# Patient Record
Sex: FEMALE | Race: WHITE | NOT HISPANIC OR LATINO | URBAN - METROPOLITAN AREA
[De-identification: names, ages, dates, MRNs, and addresses within clinical notes are randomized per-mention and may not be internally consistent; named-entity substitution may affect disease eponyms.]

---

## 2019-08-08 ENCOUNTER — EMERGENCY (EMERGENCY)
Facility: HOSPITAL | Age: 84
LOS: 1 days | Discharge: HOME | End: 2019-08-08
Attending: EMERGENCY MEDICINE | Admitting: EMERGENCY MEDICINE
Payer: MEDICARE

## 2019-08-08 VITALS
HEIGHT: 66 IN | WEIGHT: 132.94 LBS | DIASTOLIC BLOOD PRESSURE: 84 MMHG | TEMPERATURE: 98 F | RESPIRATION RATE: 16 BRPM | OXYGEN SATURATION: 96 % | SYSTOLIC BLOOD PRESSURE: 142 MMHG | HEART RATE: 85 BPM

## 2019-08-08 VITALS
DIASTOLIC BLOOD PRESSURE: 68 MMHG | RESPIRATION RATE: 18 BRPM | SYSTOLIC BLOOD PRESSURE: 157 MMHG | HEART RATE: 88 BPM | OXYGEN SATURATION: 97 %

## 2019-08-08 DIAGNOSIS — Z98.89 OTHER SPECIFIED POSTPROCEDURAL STATES: Chronic | ICD-10-CM

## 2019-08-08 DIAGNOSIS — Z96.652 PRESENCE OF LEFT ARTIFICIAL KNEE JOINT: Chronic | ICD-10-CM

## 2019-08-08 DIAGNOSIS — Z98.41 CATARACT EXTRACTION STATUS, RIGHT EYE: Chronic | ICD-10-CM

## 2019-08-08 DIAGNOSIS — Z86.79 PERSONAL HISTORY OF OTHER DISEASES OF THE CIRCULATORY SYSTEM: Chronic | ICD-10-CM

## 2019-08-08 LAB
ALBUMIN SERPL ELPH-MCNC: 3.8 G/DL — SIGNIFICANT CHANGE UP (ref 3.3–5)
ALP SERPL-CCNC: 66 U/L — SIGNIFICANT CHANGE UP (ref 30–120)
ALT FLD-CCNC: 17 U/L DA — SIGNIFICANT CHANGE UP (ref 10–60)
ANION GAP SERPL CALC-SCNC: 11 MMOL/L — SIGNIFICANT CHANGE UP (ref 5–17)
APTT BLD: 38.9 SEC — HIGH (ref 28.5–37)
AST SERPL-CCNC: 16 U/L — SIGNIFICANT CHANGE UP (ref 10–40)
BASOPHILS # BLD AUTO: 0.02 K/UL — SIGNIFICANT CHANGE UP (ref 0–0.2)
BASOPHILS NFR BLD AUTO: 0.3 % — SIGNIFICANT CHANGE UP (ref 0–2)
BILIRUB SERPL-MCNC: 3 MG/DL — HIGH (ref 0.2–1.2)
BUN SERPL-MCNC: 26 MG/DL — HIGH (ref 7–23)
CALCIUM SERPL-MCNC: 10.7 MG/DL — HIGH (ref 8.4–10.5)
CHLORIDE SERPL-SCNC: 104 MMOL/L — SIGNIFICANT CHANGE UP (ref 96–108)
CO2 SERPL-SCNC: 24 MMOL/L — SIGNIFICANT CHANGE UP (ref 22–31)
CREAT SERPL-MCNC: 1.41 MG/DL — HIGH (ref 0.5–1.3)
EOSINOPHIL # BLD AUTO: 0.03 K/UL — SIGNIFICANT CHANGE UP (ref 0–0.5)
EOSINOPHIL NFR BLD AUTO: 0.4 % — SIGNIFICANT CHANGE UP (ref 0–6)
GLUCOSE SERPL-MCNC: 177 MG/DL — HIGH (ref 70–99)
HCT VFR BLD CALC: 33.6 % — LOW (ref 34.5–45)
HGB BLD-MCNC: 11.8 G/DL — SIGNIFICANT CHANGE UP (ref 11.5–15.5)
IMM GRANULOCYTES NFR BLD AUTO: 0.4 % — SIGNIFICANT CHANGE UP (ref 0–1.5)
INR BLD: 2.36 RATIO — HIGH (ref 0.88–1.16)
LACTATE SERPL-SCNC: 1.3 MMOL/L — SIGNIFICANT CHANGE UP (ref 0.7–2)
LACTATE SERPL-SCNC: 2.3 MMOL/L — HIGH (ref 0.7–2)
LYMPHOCYTES # BLD AUTO: 2.3 K/UL — SIGNIFICANT CHANGE UP (ref 1–3.3)
LYMPHOCYTES # BLD AUTO: 29.3 % — SIGNIFICANT CHANGE UP (ref 13–44)
MCHC RBC-ENTMCNC: 32.3 PG — SIGNIFICANT CHANGE UP (ref 27–34)
MCHC RBC-ENTMCNC: 35.1 GM/DL — SIGNIFICANT CHANGE UP (ref 32–36)
MCV RBC AUTO: 92.1 FL — SIGNIFICANT CHANGE UP (ref 80–100)
MONOCYTES # BLD AUTO: 0.65 K/UL — SIGNIFICANT CHANGE UP (ref 0–0.9)
MONOCYTES NFR BLD AUTO: 8.3 % — SIGNIFICANT CHANGE UP (ref 2–14)
NEUTROPHILS # BLD AUTO: 4.83 K/UL — SIGNIFICANT CHANGE UP (ref 1.8–7.4)
NEUTROPHILS NFR BLD AUTO: 61.3 % — SIGNIFICANT CHANGE UP (ref 43–77)
NRBC # BLD: 0 /100 WBCS — SIGNIFICANT CHANGE UP (ref 0–0)
PLATELET # BLD AUTO: 181 K/UL — SIGNIFICANT CHANGE UP (ref 150–400)
POTASSIUM SERPL-MCNC: 4.2 MMOL/L — SIGNIFICANT CHANGE UP (ref 3.5–5.3)
POTASSIUM SERPL-SCNC: 4.2 MMOL/L — SIGNIFICANT CHANGE UP (ref 3.5–5.3)
PROT SERPL-MCNC: 7.3 G/DL — SIGNIFICANT CHANGE UP (ref 6–8.3)
PROTHROM AB SERPL-ACNC: 26.4 SEC — HIGH (ref 10–12.9)
RBC # BLD: 3.65 M/UL — LOW (ref 3.8–5.2)
RBC # FLD: 12.8 % — SIGNIFICANT CHANGE UP (ref 10.3–14.5)
SODIUM SERPL-SCNC: 139 MMOL/L — SIGNIFICANT CHANGE UP (ref 135–145)
WBC # BLD: 7.86 K/UL — SIGNIFICANT CHANGE UP (ref 3.8–10.5)
WBC # FLD AUTO: 7.86 K/UL — SIGNIFICANT CHANGE UP (ref 3.8–10.5)

## 2019-08-08 PROCEDURE — 73590 X-RAY EXAM OF LOWER LEG: CPT | Mod: 26,RT

## 2019-08-08 PROCEDURE — 99284 EMERGENCY DEPT VISIT MOD MDM: CPT

## 2019-08-08 PROCEDURE — 85730 THROMBOPLASTIN TIME PARTIAL: CPT

## 2019-08-08 PROCEDURE — 36415 COLL VENOUS BLD VENIPUNCTURE: CPT

## 2019-08-08 PROCEDURE — 85610 PROTHROMBIN TIME: CPT

## 2019-08-08 PROCEDURE — 85027 COMPLETE CBC AUTOMATED: CPT

## 2019-08-08 PROCEDURE — 96365 THER/PROPH/DIAG IV INF INIT: CPT

## 2019-08-08 PROCEDURE — 96361 HYDRATE IV INFUSION ADD-ON: CPT

## 2019-08-08 PROCEDURE — 99284 EMERGENCY DEPT VISIT MOD MDM: CPT | Mod: 25

## 2019-08-08 PROCEDURE — 87040 BLOOD CULTURE FOR BACTERIA: CPT

## 2019-08-08 PROCEDURE — 83605 ASSAY OF LACTIC ACID: CPT

## 2019-08-08 PROCEDURE — 80053 COMPREHEN METABOLIC PANEL: CPT

## 2019-08-08 PROCEDURE — 73590 X-RAY EXAM OF LOWER LEG: CPT

## 2019-08-08 RX ORDER — SODIUM CHLORIDE 9 MG/ML
1000 INJECTION INTRAMUSCULAR; INTRAVENOUS; SUBCUTANEOUS ONCE
Refills: 0 | Status: COMPLETED | OUTPATIENT
Start: 2019-08-08 | End: 2019-08-08

## 2019-08-08 RX ORDER — ACETAMINOPHEN 500 MG
650 TABLET ORAL ONCE
Refills: 0 | Status: COMPLETED | OUTPATIENT
Start: 2019-08-08 | End: 2019-08-08

## 2019-08-08 RX ORDER — AMPICILLIN SODIUM AND SULBACTAM SODIUM 250; 125 MG/ML; MG/ML
3 INJECTION, POWDER, FOR SUSPENSION INTRAMUSCULAR; INTRAVENOUS ONCE
Refills: 0 | Status: COMPLETED | OUTPATIENT
Start: 2019-08-08 | End: 2019-08-08

## 2019-08-08 RX ADMIN — Medication 650 MILLIGRAM(S): at 15:21

## 2019-08-08 RX ADMIN — Medication 650 MILLIGRAM(S): at 15:57

## 2019-08-08 RX ADMIN — AMPICILLIN SODIUM AND SULBACTAM SODIUM 3 GRAM(S): 250; 125 INJECTION, POWDER, FOR SUSPENSION INTRAMUSCULAR; INTRAVENOUS at 15:50

## 2019-08-08 RX ADMIN — AMPICILLIN SODIUM AND SULBACTAM SODIUM 200 GRAM(S): 250; 125 INJECTION, POWDER, FOR SUSPENSION INTRAMUSCULAR; INTRAVENOUS at 15:20

## 2019-08-08 RX ADMIN — SODIUM CHLORIDE 1000 MILLILITER(S): 9 INJECTION INTRAMUSCULAR; INTRAVENOUS; SUBCUTANEOUS at 16:30

## 2019-08-08 RX ADMIN — SODIUM CHLORIDE 2000 MILLILITER(S): 9 INJECTION INTRAMUSCULAR; INTRAVENOUS; SUBCUTANEOUS at 15:58

## 2019-08-08 RX ADMIN — SODIUM CHLORIDE 2000 MILLILITER(S): 9 INJECTION INTRAMUSCULAR; INTRAVENOUS; SUBCUTANEOUS at 16:30

## 2019-08-08 NOTE — ED PROVIDER NOTE - PROGRESS NOTE DETAILS
Maritza BURKS for ED attending, Dr. Main : discussed case with Dr. Caruso (plastics), requests d/c with Augmentin, pt can follow up at 8am in office, he will arrange wound care and hyperbarics as outpt. Reevaluated patient at bedside.  Patient feeling well.  Discussed the results of all diagnostic testing in ED and copies of all reports given.   An opportunity to ask questions was given.  Discussed the importance of prompt, close medical follow-up.  Patient will return with any changes, concerns or persistent / worsening symptoms.  Understanding of all instructions verbalized.

## 2019-08-08 NOTE — ED ADULT NURSE NOTE - OBJECTIVE STATEMENT
patient has injured her right shin from car door x 1 wk ago, follow up with doctor but didn't have instruction, went her doctor, Sara today and he sent patient to ED, noted infected opened wound, also c/o pain, patient is able to walk and pulse +, tender around her wound, MD at bedside, IV accessed and tolerated, will continue to monitor.

## 2019-08-08 NOTE — ED ADULT NURSE NOTE - NSIMPLEMENTINTERV_GEN_ALL_ED
Implemented All Universal Safety Interventions:  [place] to call system. Call bell, personal items and telephone within reach. Instruct patient to call for assistance. Room bathroom lighting operational. Non-slip footwear when patient is off stretcher. Physically safe environment: no spills, clutter or unnecessary equipment. Stretcher in lowest position, wheels locked, appropriate side rails in place.

## 2019-08-08 NOTE — ED PROVIDER NOTE - OBJECTIVE STATEMENT
85 y/o female with a PMHx of osteoarthritis, spinal stenosis, HLD, HTN, afib presents to the ED c/o RLE injury/ open wound. 2 weeks ago pt hit right lower leg on car door, saw a doctor and was told to just put a bandage over it. Saw PMD who told pt to come to the ED for evaluation. No fever, chills, nausea, vomiting, weakness, numbness.   PMD: Sara

## 2019-08-08 NOTE — ED PROVIDER NOTE - PSH
H/O bilateral cataract extraction    H/O nasal septoplasty    H/O parathyroidectomy    H/O total knee replacement, left    History of cholecystectomy    History of lumbar laminectomy  with microdiskectomy  History of temporal arteritis  b/l surgical repair

## 2019-08-08 NOTE — ED PROVIDER NOTE - CARE PROVIDER_API CALL
Valeri Caruso (MD)  Plastic Surgery  [address] [place] [address], [address]  [place], [place] [id number]  Phone: [phone]  Fax: [phone]  Follow Up Time: 1-3 Days

## 2019-08-08 NOTE — ED PROVIDER NOTE - SKIN, MLM
Skin normal color for race, warm, dry and intact. No evidence of rash. right shin 2x7cm nonhealed laceration with tendon exposure.

## 2019-08-08 NOTE — ED PROVIDER NOTE - PMH
Afib    Dyslipidemia    Hyperparathyroidism    Hypertension    Macular degeneration    Osteoarthritis    Osteoporosis    Spinal stenosis    Sternal fracture  2010  Temporal arteritis

## 2019-08-08 NOTE — ED PROVIDER NOTE - ENMT, MLM
Airway patent, Nasal mucosa clear. Mouth with normal mucosa. Throat has no vesicles, no oropharyngeal exudates and uvula is midline. Airway patent. Mouth with normal mucosa

## 2019-08-13 LAB
CULTURE RESULTS: SIGNIFICANT CHANGE UP
CULTURE RESULTS: SIGNIFICANT CHANGE UP
SPECIMEN SOURCE: SIGNIFICANT CHANGE UP
SPECIMEN SOURCE: SIGNIFICANT CHANGE UP

## 2019-08-23 ENCOUNTER — TRANSCRIPTION ENCOUNTER (OUTPATIENT)
Age: 84
End: 2019-08-23

## 2021-01-01 ENCOUNTER — INPATIENT (INPATIENT)
Facility: HOSPITAL | Age: 86
LOS: 9 days | DRG: 319 | End: 2021-07-21
Attending: INTERNAL MEDICINE | Admitting: INTERNAL MEDICINE
Payer: MEDICARE

## 2021-01-01 ENCOUNTER — EMERGENCY (EMERGENCY)
Facility: HOSPITAL | Age: 86
LOS: 1 days | Discharge: ANOTHER HOSPITAL | End: 2021-01-01
Attending: EMERGENCY MEDICINE | Admitting: EMERGENCY MEDICINE
Payer: MEDICARE

## 2021-01-01 VITALS
WEIGHT: 130.07 LBS | OXYGEN SATURATION: 99 % | TEMPERATURE: 98 F | HEIGHT: 66 IN | DIASTOLIC BLOOD PRESSURE: 76 MMHG | HEART RATE: 86 BPM | RESPIRATION RATE: 22 BRPM | SYSTOLIC BLOOD PRESSURE: 112 MMHG

## 2021-01-01 VITALS — DIASTOLIC BLOOD PRESSURE: 70 MMHG | SYSTOLIC BLOOD PRESSURE: 120 MMHG

## 2021-01-01 VITALS
WEIGHT: 130.07 LBS | HEIGHT: 66 IN | RESPIRATION RATE: 16 BRPM | TEMPERATURE: 97 F | OXYGEN SATURATION: 100 % | DIASTOLIC BLOOD PRESSURE: 79 MMHG | HEART RATE: 90 BPM | SYSTOLIC BLOOD PRESSURE: 127 MMHG

## 2021-01-01 DIAGNOSIS — N17.9 ACUTE KIDNEY FAILURE, UNSPECIFIED: ICD-10-CM

## 2021-01-01 DIAGNOSIS — Z96.652 PRESENCE OF LEFT ARTIFICIAL KNEE JOINT: Chronic | ICD-10-CM

## 2021-01-01 DIAGNOSIS — Z98.41 CATARACT EXTRACTION STATUS, RIGHT EYE: Chronic | ICD-10-CM

## 2021-01-01 DIAGNOSIS — R74.01 ELEVATION OF LEVELS OF LIVER TRANSAMINASE LEVELS: ICD-10-CM

## 2021-01-01 DIAGNOSIS — Z98.89 OTHER SPECIFIED POSTPROCEDURAL STATES: Chronic | ICD-10-CM

## 2021-01-01 DIAGNOSIS — Z86.79 PERSONAL HISTORY OF OTHER DISEASES OF THE CIRCULATORY SYSTEM: Chronic | ICD-10-CM

## 2021-01-01 DIAGNOSIS — I35.0 NONRHEUMATIC AORTIC (VALVE) STENOSIS: ICD-10-CM

## 2021-01-01 DIAGNOSIS — I21.4 NON-ST ELEVATION (NSTEMI) MYOCARDIAL INFARCTION: ICD-10-CM

## 2021-01-01 DIAGNOSIS — I48.0 PAROXYSMAL ATRIAL FIBRILLATION: ICD-10-CM

## 2021-01-01 DIAGNOSIS — I50.21 ACUTE SYSTOLIC (CONGESTIVE) HEART FAILURE: ICD-10-CM

## 2021-01-01 LAB
24R-OH-CALCIDIOL SERPL-MCNC: 47.6 NG/ML — SIGNIFICANT CHANGE UP (ref 30–80)
A1C WITH ESTIMATED AVERAGE GLUCOSE RESULT: 5.7 % — HIGH (ref 4–5.6)
ALBUMIN SERPL ELPH-MCNC: 2.3 G/DL — LOW (ref 3.3–5)
ALBUMIN SERPL ELPH-MCNC: 2.4 G/DL — LOW (ref 3.3–5)
ALBUMIN SERPL ELPH-MCNC: 2.5 G/DL — LOW (ref 3.3–5)
ALBUMIN SERPL ELPH-MCNC: 2.7 G/DL — LOW (ref 3.3–5)
ALBUMIN SERPL ELPH-MCNC: 2.8 G/DL — LOW (ref 3.3–5)
ALBUMIN SERPL ELPH-MCNC: 2.9 G/DL — LOW (ref 3.3–5)
ALBUMIN SERPL ELPH-MCNC: 2.9 G/DL — LOW (ref 3.3–5)
ALBUMIN SERPL ELPH-MCNC: 3 G/DL — LOW (ref 3.3–5)
ALBUMIN SERPL ELPH-MCNC: 3 G/DL — LOW (ref 3.3–5)
ALBUMIN SERPL ELPH-MCNC: 3.2 G/DL — LOW (ref 3.3–5)
ALBUMIN SERPL ELPH-MCNC: 3.6 G/DL — SIGNIFICANT CHANGE UP (ref 3.3–5)
ALBUMIN SERPL ELPH-MCNC: 3.7 G/DL — SIGNIFICANT CHANGE UP (ref 3.3–5)
ALBUMIN SERPL ELPH-MCNC: 4 G/DL — SIGNIFICANT CHANGE UP (ref 3.3–5)
ALBUMIN SERPL ELPH-MCNC: 4.1 G/DL — SIGNIFICANT CHANGE UP (ref 3.3–5)
ALP SERPL-CCNC: 57 U/L — SIGNIFICANT CHANGE UP (ref 40–120)
ALP SERPL-CCNC: 59 U/L — SIGNIFICANT CHANGE UP (ref 40–120)
ALP SERPL-CCNC: 60 U/L — SIGNIFICANT CHANGE UP (ref 40–120)
ALP SERPL-CCNC: 61 U/L — SIGNIFICANT CHANGE UP (ref 40–120)
ALP SERPL-CCNC: 61 U/L — SIGNIFICANT CHANGE UP (ref 40–120)
ALP SERPL-CCNC: 62 U/L — SIGNIFICANT CHANGE UP (ref 40–120)
ALP SERPL-CCNC: 62 U/L — SIGNIFICANT CHANGE UP (ref 40–120)
ALP SERPL-CCNC: 63 U/L — SIGNIFICANT CHANGE UP (ref 40–120)
ALP SERPL-CCNC: 67 U/L — SIGNIFICANT CHANGE UP (ref 30–120)
ALP SERPL-CCNC: 67 U/L — SIGNIFICANT CHANGE UP (ref 40–120)
ALP SERPL-CCNC: 73 U/L — SIGNIFICANT CHANGE UP (ref 40–120)
ALP SERPL-CCNC: 76 U/L — SIGNIFICANT CHANGE UP (ref 40–120)
ALP SERPL-CCNC: 87 U/L — SIGNIFICANT CHANGE UP (ref 40–120)
ALP SERPL-CCNC: 87 U/L — SIGNIFICANT CHANGE UP (ref 40–120)
ALP SERPL-CCNC: 91 U/L — SIGNIFICANT CHANGE UP (ref 40–120)
ALP SERPL-CCNC: 92 U/L — SIGNIFICANT CHANGE UP (ref 40–120)
ALP SERPL-CCNC: 95 U/L — SIGNIFICANT CHANGE UP (ref 40–120)
ALT FLD-CCNC: 24 U/L — SIGNIFICANT CHANGE UP (ref 10–45)
ALT FLD-CCNC: 26 U/L — SIGNIFICANT CHANGE UP (ref 10–45)
ALT FLD-CCNC: 34 U/L — SIGNIFICANT CHANGE UP (ref 10–45)
ALT FLD-CCNC: 34 U/L — SIGNIFICANT CHANGE UP (ref 10–45)
ALT FLD-CCNC: 36 U/L — SIGNIFICANT CHANGE UP (ref 10–45)
ALT FLD-CCNC: 41 U/L — SIGNIFICANT CHANGE UP (ref 10–45)
ALT FLD-CCNC: 43 U/L — SIGNIFICANT CHANGE UP (ref 10–45)
ALT FLD-CCNC: 44 U/L DA — SIGNIFICANT CHANGE UP (ref 10–60)
ALT FLD-CCNC: 45 U/L — SIGNIFICANT CHANGE UP (ref 10–45)
ALT FLD-CCNC: 46 U/L — HIGH (ref 10–45)
ALT FLD-CCNC: 48 U/L — HIGH (ref 10–45)
ALT FLD-CCNC: 48 U/L — HIGH (ref 10–45)
ALT FLD-CCNC: 49 U/L — HIGH (ref 10–45)
ALT FLD-CCNC: 49 U/L — HIGH (ref 10–45)
ALT FLD-CCNC: 51 U/L — HIGH (ref 10–45)
ALT FLD-CCNC: 52 U/L — HIGH (ref 10–45)
ALT FLD-CCNC: 53 U/L — HIGH (ref 10–45)
ANION GAP SERPL CALC-SCNC: 10 MMOL/L — SIGNIFICANT CHANGE UP (ref 5–17)
ANION GAP SERPL CALC-SCNC: 11 MMOL/L — SIGNIFICANT CHANGE UP (ref 5–17)
ANION GAP SERPL CALC-SCNC: 12 MMOL/L — SIGNIFICANT CHANGE UP (ref 5–17)
ANION GAP SERPL CALC-SCNC: 13 MMOL/L — SIGNIFICANT CHANGE UP (ref 5–17)
ANION GAP SERPL CALC-SCNC: 15 MMOL/L — SIGNIFICANT CHANGE UP (ref 5–17)
ANION GAP SERPL CALC-SCNC: 16 MMOL/L — SIGNIFICANT CHANGE UP (ref 5–17)
ANION GAP SERPL CALC-SCNC: 16 MMOL/L — SIGNIFICANT CHANGE UP (ref 5–17)
ANION GAP SERPL CALC-SCNC: 18 MMOL/L — HIGH (ref 5–17)
ANION GAP SERPL CALC-SCNC: 9 MMOL/L — SIGNIFICANT CHANGE UP (ref 5–17)
APTT BLD: 111.1 SEC — HIGH (ref 27.5–35.5)
APTT BLD: 24.9 SEC — LOW (ref 27.5–35.5)
APTT BLD: 30.3 SEC — SIGNIFICANT CHANGE UP (ref 27.5–35.5)
APTT BLD: 38.1 SEC — HIGH (ref 27.5–35.5)
APTT BLD: 39.2 SEC — HIGH (ref 27.5–35.5)
APTT BLD: 39.9 SEC — HIGH (ref 27.5–35.5)
APTT BLD: 40.1 SEC — HIGH (ref 27.5–35.5)
APTT BLD: 41.2 SEC — HIGH (ref 27.5–35.5)
APTT BLD: 74.2 SEC — HIGH (ref 27.5–35.5)
APTT BLD: 74.9 SEC — HIGH (ref 27.5–35.5)
APTT BLD: 77.1 SEC — HIGH (ref 27.5–35.5)
APTT BLD: 80.9 SEC — HIGH (ref 27.5–35.5)
APTT BLD: 82 SEC — HIGH (ref 27.5–35.5)
APTT BLD: 85.4 SEC — HIGH (ref 27.5–35.5)
APTT BLD: 85.4 SEC — HIGH (ref 27.5–35.5)
AST SERPL-CCNC: 101 U/L — HIGH (ref 10–40)
AST SERPL-CCNC: 118 U/L — HIGH (ref 10–40)
AST SERPL-CCNC: 161 U/L — HIGH (ref 10–40)
AST SERPL-CCNC: 199 U/L — HIGH (ref 10–40)
AST SERPL-CCNC: 216 U/L — HIGH (ref 10–40)
AST SERPL-CCNC: 229 U/L — HIGH (ref 10–40)
AST SERPL-CCNC: 231 U/L — HIGH (ref 10–40)
AST SERPL-CCNC: 24 U/L — SIGNIFICANT CHANGE UP (ref 10–40)
AST SERPL-CCNC: 28 U/L — SIGNIFICANT CHANGE UP (ref 10–40)
AST SERPL-CCNC: 30 U/L — SIGNIFICANT CHANGE UP (ref 10–40)
AST SERPL-CCNC: 31 U/L — SIGNIFICANT CHANGE UP (ref 10–40)
AST SERPL-CCNC: 33 U/L — SIGNIFICANT CHANGE UP (ref 10–40)
AST SERPL-CCNC: 41 U/L — HIGH (ref 10–40)
AST SERPL-CCNC: 59 U/L — HIGH (ref 10–40)
AST SERPL-CCNC: 68 U/L — HIGH (ref 10–40)
AST SERPL-CCNC: 84 U/L — HIGH (ref 10–40)
AST SERPL-CCNC: 88 U/L — HIGH (ref 10–40)
BASE EXCESS BLDA CALC-SCNC: -2.8 MMOL/L — LOW (ref -2–2)
BASE EXCESS BLDMV CALC-SCNC: -0.3 MMOL/L — SIGNIFICANT CHANGE UP (ref -3–3)
BASE EXCESS BLDMV CALC-SCNC: -1 MMOL/L — SIGNIFICANT CHANGE UP (ref -3–3)
BASE EXCESS BLDMV CALC-SCNC: -1.1 MMOL/L — SIGNIFICANT CHANGE UP (ref -3–3)
BASE EXCESS BLDMV CALC-SCNC: -1.4 MMOL/L — SIGNIFICANT CHANGE UP (ref -3–3)
BASE EXCESS BLDMV CALC-SCNC: -1.4 MMOL/L — SIGNIFICANT CHANGE UP (ref -3–3)
BASE EXCESS BLDMV CALC-SCNC: -2.6 MMOL/L — SIGNIFICANT CHANGE UP (ref -3–3)
BASE EXCESS BLDMV CALC-SCNC: -3.9 MMOL/L — LOW (ref -3–3)
BASE EXCESS BLDMV CALC-SCNC: 0.1 MMOL/L — SIGNIFICANT CHANGE UP (ref -3–3)
BASE EXCESS BLDMV CALC-SCNC: 0.3 MMOL/L — SIGNIFICANT CHANGE UP (ref -3–3)
BASE EXCESS BLDMV CALC-SCNC: 0.6 MMOL/L — SIGNIFICANT CHANGE UP (ref -3–3)
BASE EXCESS BLDMV CALC-SCNC: 0.6 MMOL/L — SIGNIFICANT CHANGE UP (ref -3–3)
BASE EXCESS BLDMV CALC-SCNC: 1.2 MMOL/L — SIGNIFICANT CHANGE UP (ref -3–3)
BASE EXCESS BLDMV CALC-SCNC: 1.3 MMOL/L — SIGNIFICANT CHANGE UP (ref -3–3)
BASE EXCESS BLDMV CALC-SCNC: 1.5 MMOL/L — SIGNIFICANT CHANGE UP (ref -3–3)
BASE EXCESS BLDMV CALC-SCNC: 1.8 MMOL/L — SIGNIFICANT CHANGE UP (ref -3–3)
BASE EXCESS BLDMV CALC-SCNC: 2.8 MMOL/L — SIGNIFICANT CHANGE UP (ref -3–3)
BASE EXCESS BLDMV CALC-SCNC: 3 MMOL/L — SIGNIFICANT CHANGE UP (ref -3–3)
BASE EXCESS BLDMV CALC-SCNC: 3.5 MMOL/L — HIGH (ref -3–3)
BASE EXCESS BLDMV CALC-SCNC: 40 MMOL/L — HIGH (ref -3–3)
BASE EXCESS BLDV CALC-SCNC: -1.3 MMOL/L — SIGNIFICANT CHANGE UP (ref -2–2)
BASE EXCESS BLDV CALC-SCNC: -1.6 MMOL/L — SIGNIFICANT CHANGE UP (ref -2–2)
BASE EXCESS BLDV CALC-SCNC: -2.4 MMOL/L — LOW (ref -2–2)
BASE EXCESS BLDV CALC-SCNC: 0.2 MMOL/L — SIGNIFICANT CHANGE UP (ref -2–2)
BASE EXCESS BLDV CALC-SCNC: 1.9 MMOL/L — SIGNIFICANT CHANGE UP (ref -2–2)
BASOPHILS # BLD AUTO: 0.01 K/UL — SIGNIFICANT CHANGE UP (ref 0–0.2)
BASOPHILS # BLD AUTO: 0.02 K/UL — SIGNIFICANT CHANGE UP (ref 0–0.2)
BASOPHILS # BLD AUTO: 0.03 K/UL — SIGNIFICANT CHANGE UP (ref 0–0.2)
BASOPHILS # BLD AUTO: 0.03 K/UL — SIGNIFICANT CHANGE UP (ref 0–0.2)
BASOPHILS # BLD AUTO: 0.04 K/UL — SIGNIFICANT CHANGE UP (ref 0–0.2)
BASOPHILS # BLD AUTO: 0.16 K/UL — SIGNIFICANT CHANGE UP (ref 0–0.2)
BASOPHILS NFR BLD AUTO: 0.1 % — SIGNIFICANT CHANGE UP (ref 0–2)
BASOPHILS NFR BLD AUTO: 0.2 % — SIGNIFICANT CHANGE UP (ref 0–2)
BASOPHILS NFR BLD AUTO: 0.3 % — SIGNIFICANT CHANGE UP (ref 0–2)
BASOPHILS NFR BLD AUTO: 0.4 % — SIGNIFICANT CHANGE UP (ref 0–2)
BASOPHILS NFR BLD AUTO: 0.5 % — SIGNIFICANT CHANGE UP (ref 0–2)
BASOPHILS NFR BLD AUTO: 0.9 % — SIGNIFICANT CHANGE UP (ref 0–2)
BILIRUB SERPL-MCNC: 0.9 MG/DL — SIGNIFICANT CHANGE UP (ref 0.2–1.2)
BILIRUB SERPL-MCNC: 1 MG/DL — SIGNIFICANT CHANGE UP (ref 0.2–1.2)
BILIRUB SERPL-MCNC: 1.1 MG/DL — SIGNIFICANT CHANGE UP (ref 0.2–1.2)
BILIRUB SERPL-MCNC: 1.1 MG/DL — SIGNIFICANT CHANGE UP (ref 0.2–1.2)
BILIRUB SERPL-MCNC: 1.2 MG/DL — SIGNIFICANT CHANGE UP (ref 0.2–1.2)
BILIRUB SERPL-MCNC: 1.3 MG/DL — HIGH (ref 0.2–1.2)
BILIRUB SERPL-MCNC: 1.3 MG/DL — HIGH (ref 0.2–1.2)
BILIRUB SERPL-MCNC: 1.5 MG/DL — HIGH (ref 0.2–1.2)
BILIRUB SERPL-MCNC: 1.5 MG/DL — HIGH (ref 0.2–1.2)
BILIRUB SERPL-MCNC: 1.6 MG/DL — HIGH (ref 0.2–1.2)
BILIRUB SERPL-MCNC: 1.8 MG/DL — HIGH (ref 0.2–1.2)
BILIRUB SERPL-MCNC: 1.8 MG/DL — HIGH (ref 0.2–1.2)
BILIRUB SERPL-MCNC: 1.9 MG/DL — HIGH (ref 0.2–1.2)
BILIRUB SERPL-MCNC: 2.1 MG/DL — HIGH (ref 0.2–1.2)
BILIRUB SERPL-MCNC: 2.6 MG/DL — HIGH (ref 0.2–1.2)
BLD GP AB SCN SERPL QL: NEGATIVE — SIGNIFICANT CHANGE UP
BUN SERPL-MCNC: 28 MG/DL — HIGH (ref 7–23)
BUN SERPL-MCNC: 32 MG/DL — HIGH (ref 7–23)
BUN SERPL-MCNC: 34 MG/DL — HIGH (ref 7–23)
BUN SERPL-MCNC: 36 MG/DL — HIGH (ref 7–23)
BUN SERPL-MCNC: 37 MG/DL — HIGH (ref 7–23)
BUN SERPL-MCNC: 37 MG/DL — HIGH (ref 7–23)
BUN SERPL-MCNC: 38 MG/DL — HIGH (ref 7–23)
BUN SERPL-MCNC: 39 MG/DL — HIGH (ref 7–23)
BUN SERPL-MCNC: 39 MG/DL — HIGH (ref 7–23)
BUN SERPL-MCNC: 42 MG/DL — HIGH (ref 7–23)
BUN SERPL-MCNC: 47 MG/DL — HIGH (ref 7–23)
BUN SERPL-MCNC: 49 MG/DL — HIGH (ref 7–23)
BUN SERPL-MCNC: 49 MG/DL — HIGH (ref 7–23)
BUN SERPL-MCNC: 50 MG/DL — HIGH (ref 7–23)
BUN SERPL-MCNC: 51 MG/DL — HIGH (ref 7–23)
CA-I SERPL-SCNC: 1.28 MMOL/L — SIGNIFICANT CHANGE UP (ref 1.12–1.3)
CA-I SERPL-SCNC: 1.29 MMOL/L — SIGNIFICANT CHANGE UP (ref 1.12–1.3)
CALCIUM SERPL-MCNC: 10 MG/DL — SIGNIFICANT CHANGE UP (ref 8.4–10.5)
CALCIUM SERPL-MCNC: 10.7 MG/DL — HIGH (ref 8.4–10.5)
CALCIUM SERPL-MCNC: 10.9 MG/DL — HIGH (ref 8.4–10.5)
CALCIUM SERPL-MCNC: 11.5 MG/DL — HIGH (ref 8.4–10.5)
CALCIUM SERPL-MCNC: 8 MG/DL — LOW (ref 8.4–10.5)
CALCIUM SERPL-MCNC: 8.5 MG/DL — SIGNIFICANT CHANGE UP (ref 8.4–10.5)
CALCIUM SERPL-MCNC: 8.8 MG/DL — SIGNIFICANT CHANGE UP (ref 8.4–10.5)
CALCIUM SERPL-MCNC: 9 MG/DL — SIGNIFICANT CHANGE UP (ref 8.4–10.5)
CALCIUM SERPL-MCNC: 9.2 MG/DL — SIGNIFICANT CHANGE UP (ref 8.4–10.5)
CALCIUM SERPL-MCNC: 9.3 MG/DL — SIGNIFICANT CHANGE UP (ref 8.4–10.5)
CALCIUM SERPL-MCNC: 9.4 MG/DL — SIGNIFICANT CHANGE UP (ref 8.4–10.5)
CALCIUM SERPL-MCNC: 9.6 MG/DL — SIGNIFICANT CHANGE UP (ref 8.4–10.5)
CALCIUM SERPL-MCNC: 9.7 MG/DL — SIGNIFICANT CHANGE UP (ref 8.4–10.5)
CALCIUM SERPL-MCNC: 9.9 MG/DL — SIGNIFICANT CHANGE UP (ref 8.4–10.5)
CHLORIDE BLDV-SCNC: 100 MMOL/L — SIGNIFICANT CHANGE UP (ref 96–108)
CHLORIDE BLDV-SCNC: 110 MMOL/L — HIGH (ref 96–108)
CHLORIDE SERPL-SCNC: 100 MMOL/L — SIGNIFICANT CHANGE UP (ref 96–108)
CHLORIDE SERPL-SCNC: 101 MMOL/L — SIGNIFICANT CHANGE UP (ref 96–108)
CHLORIDE SERPL-SCNC: 102 MMOL/L — SIGNIFICANT CHANGE UP (ref 96–108)
CHLORIDE SERPL-SCNC: 102 MMOL/L — SIGNIFICANT CHANGE UP (ref 96–108)
CHLORIDE SERPL-SCNC: 103 MMOL/L — SIGNIFICANT CHANGE UP (ref 96–108)
CHLORIDE SERPL-SCNC: 104 MMOL/L — SIGNIFICANT CHANGE UP (ref 96–108)
CHLORIDE SERPL-SCNC: 105 MMOL/L — SIGNIFICANT CHANGE UP (ref 96–108)
CHLORIDE SERPL-SCNC: 106 MMOL/L — SIGNIFICANT CHANGE UP (ref 96–108)
CHLORIDE SERPL-SCNC: 107 MMOL/L — SIGNIFICANT CHANGE UP (ref 96–108)
CHLORIDE SERPL-SCNC: 107 MMOL/L — SIGNIFICANT CHANGE UP (ref 96–108)
CHLORIDE SERPL-SCNC: 99 MMOL/L — SIGNIFICANT CHANGE UP (ref 96–108)
CHLORIDE SERPL-SCNC: 99 MMOL/L — SIGNIFICANT CHANGE UP (ref 96–108)
CHOLEST SERPL-MCNC: 105 MG/DL — SIGNIFICANT CHANGE UP
CK MB BLD-MCNC: 10.1 % — HIGH (ref 0–3.5)
CK MB BLD-MCNC: 10.7 % — HIGH (ref 0–3.5)
CK MB BLD-MCNC: 7.4 % — HIGH (ref 0–3.5)
CK MB BLD-MCNC: 7.4 % — HIGH (ref 0–3.5)
CK MB BLD-MCNC: 9.9 % — HIGH (ref 0–3.5)
CK MB CFR SERPL CALC: 22.2 NG/ML — HIGH (ref 0–3.8)
CK MB CFR SERPL CALC: 26.6 NG/ML — HIGH (ref 0–3.8)
CK MB CFR SERPL CALC: 73.5 NG/ML — HIGH (ref 0–3.8)
CK MB CFR SERPL CALC: 93.1 NG/ML — HIGH (ref 0–3.8)
CK MB CFR SERPL CALC: 94.5 NG/ML — HIGH (ref 0–3.6)
CK SERPL-CCNC: 299 U/L — HIGH (ref 25–170)
CK SERPL-CCNC: 359 U/L — HIGH (ref 25–170)
CK SERPL-CCNC: 745 U/L — HIGH (ref 25–170)
CK SERPL-CCNC: 885 U/L — HIGH (ref 26–192)
CK SERPL-CCNC: 926 U/L — HIGH (ref 25–170)
CO2 BLDA-SCNC: 21 MMOL/L — LOW (ref 22–30)
CO2 BLDMV-SCNC: 22 MMOL/L — SIGNIFICANT CHANGE UP (ref 21–29)
CO2 BLDMV-SCNC: 23 MMOL/L — SIGNIFICANT CHANGE UP (ref 21–29)
CO2 BLDMV-SCNC: 24 MMOL/L — SIGNIFICANT CHANGE UP (ref 21–29)
CO2 BLDMV-SCNC: 25 MMOL/L — SIGNIFICANT CHANGE UP (ref 21–29)
CO2 BLDMV-SCNC: 26 MMOL/L — SIGNIFICANT CHANGE UP (ref 21–29)
CO2 BLDMV-SCNC: 26 MMOL/L — SIGNIFICANT CHANGE UP (ref 21–29)
CO2 BLDMV-SCNC: 27 MMOL/L — SIGNIFICANT CHANGE UP (ref 21–29)
CO2 BLDMV-SCNC: 28 MMOL/L — SIGNIFICANT CHANGE UP (ref 21–29)
CO2 BLDMV-SCNC: 29 MMOL/L — SIGNIFICANT CHANGE UP (ref 21–29)
CO2 BLDMV-SCNC: 29 MMOL/L — SIGNIFICANT CHANGE UP (ref 21–29)
CO2 BLDMV-SCNC: 30 MMOL/L — HIGH (ref 21–29)
CO2 BLDMV-SCNC: 31 MMOL/L — HIGH (ref 21–29)
CO2 BLDV-SCNC: 24 MMOL/L — SIGNIFICANT CHANGE UP (ref 22–30)
CO2 BLDV-SCNC: 24 MMOL/L — SIGNIFICANT CHANGE UP (ref 22–30)
CO2 BLDV-SCNC: 26 MMOL/L — SIGNIFICANT CHANGE UP (ref 22–30)
CO2 BLDV-SCNC: 26 MMOL/L — SIGNIFICANT CHANGE UP (ref 22–30)
CO2 BLDV-SCNC: 28 MMOL/L — SIGNIFICANT CHANGE UP (ref 22–30)
CO2 SERPL-SCNC: 16 MMOL/L — LOW (ref 22–31)
CO2 SERPL-SCNC: 16 MMOL/L — LOW (ref 22–31)
CO2 SERPL-SCNC: 17 MMOL/L — LOW (ref 22–31)
CO2 SERPL-SCNC: 17 MMOL/L — LOW (ref 22–31)
CO2 SERPL-SCNC: 18 MMOL/L — LOW (ref 22–31)
CO2 SERPL-SCNC: 19 MMOL/L — LOW (ref 22–31)
CO2 SERPL-SCNC: 19 MMOL/L — LOW (ref 22–31)
CO2 SERPL-SCNC: 21 MMOL/L — LOW (ref 22–31)
CO2 SERPL-SCNC: 22 MMOL/L — SIGNIFICANT CHANGE UP (ref 22–31)
CO2 SERPL-SCNC: 23 MMOL/L — SIGNIFICANT CHANGE UP (ref 22–31)
CORTIS AM PEAK SERPL-MCNC: 13 UG/DL — SIGNIFICANT CHANGE UP (ref 6–18.4)
COVID-19 NUCLEOCAPSID GAM AB INTERP: NEGATIVE — SIGNIFICANT CHANGE UP
COVID-19 NUCLEOCAPSID TOTAL GAM ANTIBODY RESULT: 0.1 INDEX — SIGNIFICANT CHANGE UP
COVID-19 SPIKE DOMAIN AB INTERP: POSITIVE
COVID-19 SPIKE DOMAIN ANTIBODY RESULT: >250 U/ML — HIGH
CREAT SERPL-MCNC: 1.4 MG/DL — HIGH (ref 0.5–1.3)
CREAT SERPL-MCNC: 1.51 MG/DL — HIGH (ref 0.5–1.3)
CREAT SERPL-MCNC: 1.51 MG/DL — HIGH (ref 0.5–1.3)
CREAT SERPL-MCNC: 1.58 MG/DL — HIGH (ref 0.5–1.3)
CREAT SERPL-MCNC: 1.59 MG/DL — HIGH (ref 0.5–1.3)
CREAT SERPL-MCNC: 1.61 MG/DL — HIGH (ref 0.5–1.3)
CREAT SERPL-MCNC: 1.8 MG/DL — HIGH (ref 0.5–1.3)
CREAT SERPL-MCNC: 1.88 MG/DL — HIGH (ref 0.5–1.3)
CREAT SERPL-MCNC: 1.89 MG/DL — HIGH (ref 0.5–1.3)
CREAT SERPL-MCNC: 1.92 MG/DL — HIGH (ref 0.5–1.3)
CREAT SERPL-MCNC: 1.99 MG/DL — HIGH (ref 0.5–1.3)
CREAT SERPL-MCNC: 2.19 MG/DL — HIGH (ref 0.5–1.3)
CREAT SERPL-MCNC: 2.73 MG/DL — HIGH (ref 0.5–1.3)
CREAT SERPL-MCNC: 2.99 MG/DL — HIGH (ref 0.5–1.3)
CREAT SERPL-MCNC: 3.01 MG/DL — HIGH (ref 0.5–1.3)
CREAT SERPL-MCNC: 3.16 MG/DL — HIGH (ref 0.5–1.3)
CREAT SERPL-MCNC: 3.27 MG/DL — HIGH (ref 0.5–1.3)
D DIMER BLD IA.RAPID-MCNC: 377 NG/ML DDU — HIGH
EOSINOPHIL # BLD AUTO: 0 K/UL — SIGNIFICANT CHANGE UP (ref 0–0.5)
EOSINOPHIL # BLD AUTO: 0.03 K/UL — SIGNIFICANT CHANGE UP (ref 0–0.5)
EOSINOPHIL # BLD AUTO: 0.03 K/UL — SIGNIFICANT CHANGE UP (ref 0–0.5)
EOSINOPHIL # BLD AUTO: 0.04 K/UL — SIGNIFICANT CHANGE UP (ref 0–0.5)
EOSINOPHIL # BLD AUTO: 0.05 K/UL — SIGNIFICANT CHANGE UP (ref 0–0.5)
EOSINOPHIL # BLD AUTO: 0.07 K/UL — SIGNIFICANT CHANGE UP (ref 0–0.5)
EOSINOPHIL # BLD AUTO: 0.07 K/UL — SIGNIFICANT CHANGE UP (ref 0–0.5)
EOSINOPHIL # BLD AUTO: 0.09 K/UL — SIGNIFICANT CHANGE UP (ref 0–0.5)
EOSINOPHIL # BLD AUTO: 0.13 K/UL — SIGNIFICANT CHANGE UP (ref 0–0.5)
EOSINOPHIL # BLD AUTO: 0.14 K/UL — SIGNIFICANT CHANGE UP (ref 0–0.5)
EOSINOPHIL # BLD AUTO: 0.14 K/UL — SIGNIFICANT CHANGE UP (ref 0–0.5)
EOSINOPHIL # BLD AUTO: 0.17 K/UL — SIGNIFICANT CHANGE UP (ref 0–0.5)
EOSINOPHIL NFR BLD AUTO: 0 % — SIGNIFICANT CHANGE UP (ref 0–6)
EOSINOPHIL NFR BLD AUTO: 0.3 % — SIGNIFICANT CHANGE UP (ref 0–6)
EOSINOPHIL NFR BLD AUTO: 0.3 % — SIGNIFICANT CHANGE UP (ref 0–6)
EOSINOPHIL NFR BLD AUTO: 0.4 % — SIGNIFICANT CHANGE UP (ref 0–6)
EOSINOPHIL NFR BLD AUTO: 0.6 % — SIGNIFICANT CHANGE UP (ref 0–6)
EOSINOPHIL NFR BLD AUTO: 0.8 % — SIGNIFICANT CHANGE UP (ref 0–6)
EOSINOPHIL NFR BLD AUTO: 0.9 % — SIGNIFICANT CHANGE UP (ref 0–6)
EOSINOPHIL NFR BLD AUTO: 1.1 % — SIGNIFICANT CHANGE UP (ref 0–6)
EOSINOPHIL NFR BLD AUTO: 1.3 % — SIGNIFICANT CHANGE UP (ref 0–6)
EOSINOPHIL NFR BLD AUTO: 1.7 % — SIGNIFICANT CHANGE UP (ref 0–6)
EOSINOPHIL NFR BLD AUTO: 1.8 % — SIGNIFICANT CHANGE UP (ref 0–6)
EOSINOPHIL NFR BLD AUTO: 2.1 % — SIGNIFICANT CHANGE UP (ref 0–6)
ESTIMATED AVERAGE GLUCOSE: 117 MG/DL — HIGH (ref 68–114)
GAS PNL BLDA: SIGNIFICANT CHANGE UP
GAS PNL BLDMV: SIGNIFICANT CHANGE UP
GAS PNL BLDV: 134 MMOL/L — LOW (ref 135–145)
GAS PNL BLDV: 137 MMOL/L — SIGNIFICANT CHANGE UP (ref 135–145)
GAS PNL BLDV: SIGNIFICANT CHANGE UP
GLUCOSE BLDV-MCNC: 132 MG/DL — HIGH (ref 70–99)
GLUCOSE BLDV-MCNC: 162 MG/DL — HIGH (ref 70–99)
GLUCOSE SERPL-MCNC: 106 MG/DL — HIGH (ref 70–99)
GLUCOSE SERPL-MCNC: 109 MG/DL — HIGH (ref 70–99)
GLUCOSE SERPL-MCNC: 122 MG/DL — HIGH (ref 70–99)
GLUCOSE SERPL-MCNC: 128 MG/DL — HIGH (ref 70–99)
GLUCOSE SERPL-MCNC: 129 MG/DL — HIGH (ref 70–99)
GLUCOSE SERPL-MCNC: 133 MG/DL — HIGH (ref 70–99)
GLUCOSE SERPL-MCNC: 137 MG/DL — HIGH (ref 70–99)
GLUCOSE SERPL-MCNC: 138 MG/DL — HIGH (ref 70–99)
GLUCOSE SERPL-MCNC: 148 MG/DL — HIGH (ref 70–99)
GLUCOSE SERPL-MCNC: 156 MG/DL — HIGH (ref 70–99)
GLUCOSE SERPL-MCNC: 159 MG/DL — HIGH (ref 70–99)
GLUCOSE SERPL-MCNC: 161 MG/DL — HIGH (ref 70–99)
GLUCOSE SERPL-MCNC: 163 MG/DL — HIGH (ref 70–99)
GLUCOSE SERPL-MCNC: 169 MG/DL — HIGH (ref 70–99)
GLUCOSE SERPL-MCNC: 173 MG/DL — HIGH (ref 70–99)
GLUCOSE SERPL-MCNC: 210 MG/DL — HIGH (ref 70–99)
GLUCOSE SERPL-MCNC: 226 MG/DL — HIGH (ref 70–99)
HCO3 BLDA-SCNC: 20 MMOL/L — LOW (ref 21–29)
HCO3 BLDMV-SCNC: 21 MMOL/L — SIGNIFICANT CHANGE UP (ref 20–28)
HCO3 BLDMV-SCNC: 22 MMOL/L — SIGNIFICANT CHANGE UP (ref 20–28)
HCO3 BLDMV-SCNC: 23 MMOL/L — SIGNIFICANT CHANGE UP (ref 20–28)
HCO3 BLDMV-SCNC: 24 MMOL/L — SIGNIFICANT CHANGE UP (ref 20–28)
HCO3 BLDMV-SCNC: 25 MMOL/L — SIGNIFICANT CHANGE UP (ref 20–28)
HCO3 BLDMV-SCNC: 26 MMOL/L — SIGNIFICANT CHANGE UP (ref 20–28)
HCO3 BLDMV-SCNC: 28 MMOL/L — SIGNIFICANT CHANGE UP (ref 20–28)
HCO3 BLDMV-SCNC: 29 MMOL/L — HIGH (ref 20–28)
HCO3 BLDV-SCNC: 23 MMOL/L — SIGNIFICANT CHANGE UP (ref 21–29)
HCO3 BLDV-SCNC: 23 MMOL/L — SIGNIFICANT CHANGE UP (ref 21–29)
HCO3 BLDV-SCNC: 24 MMOL/L — SIGNIFICANT CHANGE UP (ref 21–29)
HCO3 BLDV-SCNC: 25 MMOL/L — SIGNIFICANT CHANGE UP (ref 21–29)
HCO3 BLDV-SCNC: 26 MMOL/L — SIGNIFICANT CHANGE UP (ref 21–29)
HCT VFR BLD CALC: 22.6 % — LOW (ref 34.5–45)
HCT VFR BLD CALC: 24.5 % — LOW (ref 34.5–45)
HCT VFR BLD CALC: 26.2 % — LOW (ref 34.5–45)
HCT VFR BLD CALC: 26.3 % — LOW (ref 34.5–45)
HCT VFR BLD CALC: 26.6 % — LOW (ref 34.5–45)
HCT VFR BLD CALC: 27.8 % — LOW (ref 34.5–45)
HCT VFR BLD CALC: 28.1 % — LOW (ref 34.5–45)
HCT VFR BLD CALC: 30.1 % — LOW (ref 34.5–45)
HCT VFR BLD CALC: 30.4 % — LOW (ref 34.5–45)
HCT VFR BLD CALC: 30.5 % — LOW (ref 34.5–45)
HCT VFR BLD CALC: 30.5 % — LOW (ref 34.5–45)
HCT VFR BLD CALC: 31.2 % — LOW (ref 34.5–45)
HCT VFR BLD CALC: 32.2 % — LOW (ref 34.5–45)
HCT VFR BLD CALC: 35.7 % — SIGNIFICANT CHANGE UP (ref 34.5–45)
HCT VFR BLD CALC: 36.8 % — SIGNIFICANT CHANGE UP (ref 34.5–45)
HCT VFR BLD CALC: 38.8 % — SIGNIFICANT CHANGE UP (ref 34.5–45)
HCT VFR BLD CALC: 38.9 % — SIGNIFICANT CHANGE UP (ref 34.5–45)
HCT VFR BLDA CALC: 24 % — LOW (ref 39–50)
HCT VFR BLDA CALC: 42 % — SIGNIFICANT CHANGE UP (ref 39–50)
HDLC SERPL-MCNC: 51 MG/DL — SIGNIFICANT CHANGE UP
HGB BLD CALC-MCNC: 13.5 G/DL — SIGNIFICANT CHANGE UP (ref 11.5–15.5)
HGB BLD CALC-MCNC: 7.6 G/DL — LOW (ref 11.5–15.5)
HGB BLD-MCNC: 10.1 G/DL — LOW (ref 11.5–15.5)
HGB BLD-MCNC: 10.2 G/DL — LOW (ref 11.5–15.5)
HGB BLD-MCNC: 10.5 G/DL — LOW (ref 11.5–15.5)
HGB BLD-MCNC: 11 G/DL — LOW (ref 11.5–15.5)
HGB BLD-MCNC: 12 G/DL — SIGNIFICANT CHANGE UP (ref 11.5–15.5)
HGB BLD-MCNC: 12.5 G/DL — SIGNIFICANT CHANGE UP (ref 11.5–15.5)
HGB BLD-MCNC: 12.5 G/DL — SIGNIFICANT CHANGE UP (ref 11.5–15.5)
HGB BLD-MCNC: 13 G/DL — SIGNIFICANT CHANGE UP (ref 11.5–15.5)
HGB BLD-MCNC: 7.5 G/DL — LOW (ref 11.5–15.5)
HGB BLD-MCNC: 8 G/DL — LOW (ref 11.5–15.5)
HGB BLD-MCNC: 8.5 G/DL — LOW (ref 11.5–15.5)
HGB BLD-MCNC: 8.7 G/DL — LOW (ref 11.5–15.5)
HGB BLD-MCNC: 8.8 G/DL — LOW (ref 11.5–15.5)
HGB BLD-MCNC: 9.3 G/DL — LOW (ref 11.5–15.5)
HGB BLD-MCNC: 9.3 G/DL — LOW (ref 11.5–15.5)
HGB BLD-MCNC: 9.7 G/DL — LOW (ref 11.5–15.5)
HGB BLD-MCNC: 9.9 G/DL — LOW (ref 11.5–15.5)
HOROWITZ INDEX BLDA+IHG-RTO: 21 — SIGNIFICANT CHANGE UP
HOROWITZ INDEX BLDMV+IHG-RTO: 0 — SIGNIFICANT CHANGE UP
HOROWITZ INDEX BLDMV+IHG-RTO: 100 — SIGNIFICANT CHANGE UP
HOROWITZ INDEX BLDMV+IHG-RTO: 21 — SIGNIFICANT CHANGE UP
HOROWITZ INDEX BLDMV+IHG-RTO: 21 — SIGNIFICANT CHANGE UP
HOROWITZ INDEX BLDMV+IHG-RTO: 28 — SIGNIFICANT CHANGE UP
HOROWITZ INDEX BLDMV+IHG-RTO: 32 — SIGNIFICANT CHANGE UP
HOROWITZ INDEX BLDMV+IHG-RTO: 32 — SIGNIFICANT CHANGE UP
HOROWITZ INDEX BLDMV+IHG-RTO: 40 — SIGNIFICANT CHANGE UP
HOROWITZ INDEX BLDMV+IHG-RTO: 44 — SIGNIFICANT CHANGE UP
HOROWITZ INDEX BLDMV+IHG-RTO: 44 — SIGNIFICANT CHANGE UP
HOROWITZ INDEX BLDV+IHG-RTO: 21 — SIGNIFICANT CHANGE UP
HOROWITZ INDEX BLDV+IHG-RTO: 21 — SIGNIFICANT CHANGE UP
HOROWITZ INDEX BLDV+IHG-RTO: 40 — SIGNIFICANT CHANGE UP
IMM GRANULOCYTES NFR BLD AUTO: 0.3 % — SIGNIFICANT CHANGE UP (ref 0–1.5)
IMM GRANULOCYTES NFR BLD AUTO: 0.4 % — SIGNIFICANT CHANGE UP (ref 0–1.5)
IMM GRANULOCYTES NFR BLD AUTO: 0.4 % — SIGNIFICANT CHANGE UP (ref 0–1.5)
IMM GRANULOCYTES NFR BLD AUTO: 0.5 % — SIGNIFICANT CHANGE UP (ref 0–1.5)
IMM GRANULOCYTES NFR BLD AUTO: 0.6 % — SIGNIFICANT CHANGE UP (ref 0–1.5)
IMM GRANULOCYTES NFR BLD AUTO: 0.7 % — SIGNIFICANT CHANGE UP (ref 0–1.5)
IMM GRANULOCYTES NFR BLD AUTO: 0.7 % — SIGNIFICANT CHANGE UP (ref 0–1.5)
INR BLD: 1.01 RATIO — SIGNIFICANT CHANGE UP (ref 0.88–1.16)
INR BLD: 1.01 RATIO — SIGNIFICANT CHANGE UP (ref 0.88–1.16)
INR BLD: 1.04 RATIO — SIGNIFICANT CHANGE UP (ref 0.88–1.16)
INR BLD: 1.11 RATIO — SIGNIFICANT CHANGE UP (ref 0.88–1.16)
INR BLD: 1.22 RATIO — HIGH (ref 0.88–1.16)
INR BLD: 2.15 RATIO — HIGH (ref 0.88–1.16)
INR BLD: 2.21 RATIO — HIGH (ref 0.88–1.16)
INR BLD: 2.42 RATIO — HIGH (ref 0.88–1.16)
INR BLD: 3.54 RATIO — HIGH (ref 0.88–1.16)
INR BLD: 3.81 RATIO — HIGH (ref 0.88–1.16)
LACTATE BLDV-MCNC: 1.1 MMOL/L — SIGNIFICANT CHANGE UP (ref 0.7–2)
LACTATE BLDV-MCNC: 2.5 MMOL/L — HIGH (ref 0.7–2)
LACTATE SERPL-SCNC: 1 MMOL/L — SIGNIFICANT CHANGE UP (ref 0.7–2)
LACTATE SERPL-SCNC: 1 MMOL/L — SIGNIFICANT CHANGE UP (ref 0.7–2)
LACTATE SERPL-SCNC: 1.6 MMOL/L — SIGNIFICANT CHANGE UP (ref 0.7–2)
LACTATE SERPL-SCNC: 1.6 MMOL/L — SIGNIFICANT CHANGE UP (ref 0.7–2)
LACTATE SERPL-SCNC: 1.9 MMOL/L — SIGNIFICANT CHANGE UP (ref 0.7–2)
LIDOCAIN IGE QN: 43 U/L — SIGNIFICANT CHANGE UP (ref 7–60)
LIPID PNL WITH DIRECT LDL SERPL: 36 MG/DL — SIGNIFICANT CHANGE UP
LYMPHOCYTES # BLD AUTO: 0.8 K/UL — LOW (ref 1–3.3)
LYMPHOCYTES # BLD AUTO: 0.87 K/UL — LOW (ref 1–3.3)
LYMPHOCYTES # BLD AUTO: 1.28 K/UL — SIGNIFICANT CHANGE UP (ref 1–3.3)
LYMPHOCYTES # BLD AUTO: 1.34 K/UL — SIGNIFICANT CHANGE UP (ref 1–3.3)
LYMPHOCYTES # BLD AUTO: 1.39 K/UL — SIGNIFICANT CHANGE UP (ref 1–3.3)
LYMPHOCYTES # BLD AUTO: 1.43 K/UL — SIGNIFICANT CHANGE UP (ref 1–3.3)
LYMPHOCYTES # BLD AUTO: 1.57 K/UL — SIGNIFICANT CHANGE UP (ref 1–3.3)
LYMPHOCYTES # BLD AUTO: 1.59 K/UL — SIGNIFICANT CHANGE UP (ref 1–3.3)
LYMPHOCYTES # BLD AUTO: 1.59 K/UL — SIGNIFICANT CHANGE UP (ref 1–3.3)
LYMPHOCYTES # BLD AUTO: 1.72 K/UL — SIGNIFICANT CHANGE UP (ref 1–3.3)
LYMPHOCYTES # BLD AUTO: 1.75 K/UL — SIGNIFICANT CHANGE UP (ref 1–3.3)
LYMPHOCYTES # BLD AUTO: 1.79 K/UL — SIGNIFICANT CHANGE UP (ref 1–3.3)
LYMPHOCYTES # BLD AUTO: 1.85 K/UL — SIGNIFICANT CHANGE UP (ref 1–3.3)
LYMPHOCYTES # BLD AUTO: 12.2 % — LOW (ref 13–44)
LYMPHOCYTES # BLD AUTO: 12.6 % — LOW (ref 13–44)
LYMPHOCYTES # BLD AUTO: 13.1 % — SIGNIFICANT CHANGE UP (ref 13–44)
LYMPHOCYTES # BLD AUTO: 14.8 % — SIGNIFICANT CHANGE UP (ref 13–44)
LYMPHOCYTES # BLD AUTO: 16.7 % — SIGNIFICANT CHANGE UP (ref 13–44)
LYMPHOCYTES # BLD AUTO: 17.1 % — SIGNIFICANT CHANGE UP (ref 13–44)
LYMPHOCYTES # BLD AUTO: 17.2 % — SIGNIFICANT CHANGE UP (ref 13–44)
LYMPHOCYTES # BLD AUTO: 18.5 % — SIGNIFICANT CHANGE UP (ref 13–44)
LYMPHOCYTES # BLD AUTO: 19.5 % — SIGNIFICANT CHANGE UP (ref 13–44)
LYMPHOCYTES # BLD AUTO: 19.9 % — SIGNIFICANT CHANGE UP (ref 13–44)
LYMPHOCYTES # BLD AUTO: 2.16 K/UL — SIGNIFICANT CHANGE UP (ref 1–3.3)
LYMPHOCYTES # BLD AUTO: 2.28 K/UL — SIGNIFICANT CHANGE UP (ref 1–3.3)
LYMPHOCYTES # BLD AUTO: 2.37 K/UL — SIGNIFICANT CHANGE UP (ref 1–3.3)
LYMPHOCYTES # BLD AUTO: 21.1 % — SIGNIFICANT CHANGE UP (ref 13–44)
LYMPHOCYTES # BLD AUTO: 21.7 % — SIGNIFICANT CHANGE UP (ref 13–44)
LYMPHOCYTES # BLD AUTO: 24.5 % — SIGNIFICANT CHANGE UP (ref 13–44)
LYMPHOCYTES # BLD AUTO: 28.5 % — SIGNIFICANT CHANGE UP (ref 13–44)
LYMPHOCYTES # BLD AUTO: 4.4 % — LOW (ref 13–44)
LYMPHOCYTES # BLD AUTO: 8.9 % — LOW (ref 13–44)
MAGNESIUM SERPL-MCNC: 1.7 MG/DL — SIGNIFICANT CHANGE UP (ref 1.6–2.6)
MAGNESIUM SERPL-MCNC: 1.7 MG/DL — SIGNIFICANT CHANGE UP (ref 1.6–2.6)
MAGNESIUM SERPL-MCNC: 1.8 MG/DL — SIGNIFICANT CHANGE UP (ref 1.6–2.6)
MAGNESIUM SERPL-MCNC: 1.9 MG/DL — SIGNIFICANT CHANGE UP (ref 1.6–2.6)
MAGNESIUM SERPL-MCNC: 2 MG/DL — SIGNIFICANT CHANGE UP (ref 1.6–2.6)
MAGNESIUM SERPL-MCNC: 2.1 MG/DL — SIGNIFICANT CHANGE UP (ref 1.6–2.6)
MAGNESIUM SERPL-MCNC: 2.2 MG/DL — SIGNIFICANT CHANGE UP (ref 1.6–2.6)
MCHC RBC-ENTMCNC: 29.8 PG — SIGNIFICANT CHANGE UP (ref 27–34)
MCHC RBC-ENTMCNC: 30.7 PG — SIGNIFICANT CHANGE UP (ref 27–34)
MCHC RBC-ENTMCNC: 30.8 PG — SIGNIFICANT CHANGE UP (ref 27–34)
MCHC RBC-ENTMCNC: 31 PG — SIGNIFICANT CHANGE UP (ref 27–34)
MCHC RBC-ENTMCNC: 31.1 PG — SIGNIFICANT CHANGE UP (ref 27–34)
MCHC RBC-ENTMCNC: 31.2 PG — SIGNIFICANT CHANGE UP (ref 27–34)
MCHC RBC-ENTMCNC: 31.2 PG — SIGNIFICANT CHANGE UP (ref 27–34)
MCHC RBC-ENTMCNC: 31.3 PG — SIGNIFICANT CHANGE UP (ref 27–34)
MCHC RBC-ENTMCNC: 31.5 PG — SIGNIFICANT CHANGE UP (ref 27–34)
MCHC RBC-ENTMCNC: 31.5 PG — SIGNIFICANT CHANGE UP (ref 27–34)
MCHC RBC-ENTMCNC: 31.6 PG — SIGNIFICANT CHANGE UP (ref 27–34)
MCHC RBC-ENTMCNC: 31.7 PG — SIGNIFICANT CHANGE UP (ref 27–34)
MCHC RBC-ENTMCNC: 31.8 GM/DL — LOW (ref 32–36)
MCHC RBC-ENTMCNC: 31.9 PG — SIGNIFICANT CHANGE UP (ref 27–34)
MCHC RBC-ENTMCNC: 32.1 GM/DL — SIGNIFICANT CHANGE UP (ref 32–36)
MCHC RBC-ENTMCNC: 32.1 PG — SIGNIFICANT CHANGE UP (ref 27–34)
MCHC RBC-ENTMCNC: 32.4 GM/DL — SIGNIFICANT CHANGE UP (ref 32–36)
MCHC RBC-ENTMCNC: 32.7 GM/DL — SIGNIFICANT CHANGE UP (ref 32–36)
MCHC RBC-ENTMCNC: 32.9 GM/DL — SIGNIFICANT CHANGE UP (ref 32–36)
MCHC RBC-ENTMCNC: 33.1 GM/DL — SIGNIFICANT CHANGE UP (ref 32–36)
MCHC RBC-ENTMCNC: 33.2 GM/DL — SIGNIFICANT CHANGE UP (ref 32–36)
MCHC RBC-ENTMCNC: 33.2 GM/DL — SIGNIFICANT CHANGE UP (ref 32–36)
MCHC RBC-ENTMCNC: 33.4 GM/DL — SIGNIFICANT CHANGE UP (ref 32–36)
MCHC RBC-ENTMCNC: 33.5 GM/DL — SIGNIFICANT CHANGE UP (ref 32–36)
MCHC RBC-ENTMCNC: 33.5 GM/DL — SIGNIFICANT CHANGE UP (ref 32–36)
MCHC RBC-ENTMCNC: 33.6 GM/DL — SIGNIFICANT CHANGE UP (ref 32–36)
MCHC RBC-ENTMCNC: 33.7 GM/DL — SIGNIFICANT CHANGE UP (ref 32–36)
MCHC RBC-ENTMCNC: 34 GM/DL — SIGNIFICANT CHANGE UP (ref 32–36)
MCHC RBC-ENTMCNC: 34.2 GM/DL — SIGNIFICANT CHANGE UP (ref 32–36)
MCV RBC AUTO: 92.2 FL — SIGNIFICANT CHANGE UP (ref 80–100)
MCV RBC AUTO: 92.5 FL — SIGNIFICANT CHANGE UP (ref 80–100)
MCV RBC AUTO: 92.5 FL — SIGNIFICANT CHANGE UP (ref 80–100)
MCV RBC AUTO: 92.6 FL — SIGNIFICANT CHANGE UP (ref 80–100)
MCV RBC AUTO: 93.6 FL — SIGNIFICANT CHANGE UP (ref 80–100)
MCV RBC AUTO: 93.6 FL — SIGNIFICANT CHANGE UP (ref 80–100)
MCV RBC AUTO: 93.7 FL — SIGNIFICANT CHANGE UP (ref 80–100)
MCV RBC AUTO: 93.8 FL — SIGNIFICANT CHANGE UP (ref 80–100)
MCV RBC AUTO: 93.8 FL — SIGNIFICANT CHANGE UP (ref 80–100)
MCV RBC AUTO: 94.1 FL — SIGNIFICANT CHANGE UP (ref 80–100)
MCV RBC AUTO: 94.6 FL — SIGNIFICANT CHANGE UP (ref 80–100)
MCV RBC AUTO: 95.1 FL — SIGNIFICANT CHANGE UP (ref 80–100)
MCV RBC AUTO: 95.3 FL — SIGNIFICANT CHANGE UP (ref 80–100)
MCV RBC AUTO: 95.6 FL — SIGNIFICANT CHANGE UP (ref 80–100)
MCV RBC AUTO: 96.3 FL — SIGNIFICANT CHANGE UP (ref 80–100)
MCV RBC AUTO: 96.6 FL — SIGNIFICANT CHANGE UP (ref 80–100)
MCV RBC AUTO: 97.2 FL — SIGNIFICANT CHANGE UP (ref 80–100)
MONOCYTES # BLD AUTO: 0 K/UL — SIGNIFICANT CHANGE UP (ref 0–0.9)
MONOCYTES # BLD AUTO: 0.45 K/UL — SIGNIFICANT CHANGE UP (ref 0–0.9)
MONOCYTES # BLD AUTO: 0.63 K/UL — SIGNIFICANT CHANGE UP (ref 0–0.9)
MONOCYTES # BLD AUTO: 0.81 K/UL — SIGNIFICANT CHANGE UP (ref 0–0.9)
MONOCYTES # BLD AUTO: 0.92 K/UL — HIGH (ref 0–0.9)
MONOCYTES # BLD AUTO: 0.92 K/UL — HIGH (ref 0–0.9)
MONOCYTES # BLD AUTO: 0.95 K/UL — HIGH (ref 0–0.9)
MONOCYTES # BLD AUTO: 1.03 K/UL — HIGH (ref 0–0.9)
MONOCYTES # BLD AUTO: 1.05 K/UL — HIGH (ref 0–0.9)
MONOCYTES # BLD AUTO: 1.06 K/UL — HIGH (ref 0–0.9)
MONOCYTES # BLD AUTO: 1.09 K/UL — HIGH (ref 0–0.9)
MONOCYTES # BLD AUTO: 1.11 K/UL — HIGH (ref 0–0.9)
MONOCYTES # BLD AUTO: 1.18 K/UL — HIGH (ref 0–0.9)
MONOCYTES # BLD AUTO: 1.24 K/UL — HIGH (ref 0–0.9)
MONOCYTES # BLD AUTO: 1.27 K/UL — HIGH (ref 0–0.9)
MONOCYTES # BLD AUTO: 1.32 K/UL — HIGH (ref 0–0.9)
MONOCYTES NFR BLD AUTO: 0 % — LOW (ref 2–14)
MONOCYTES NFR BLD AUTO: 10.1 % — SIGNIFICANT CHANGE UP (ref 2–14)
MONOCYTES NFR BLD AUTO: 10.3 % — SIGNIFICANT CHANGE UP (ref 2–14)
MONOCYTES NFR BLD AUTO: 10.6 % — SIGNIFICANT CHANGE UP (ref 2–14)
MONOCYTES NFR BLD AUTO: 10.6 % — SIGNIFICANT CHANGE UP (ref 2–14)
MONOCYTES NFR BLD AUTO: 11.3 % — SIGNIFICANT CHANGE UP (ref 2–14)
MONOCYTES NFR BLD AUTO: 11.5 % — SIGNIFICANT CHANGE UP (ref 2–14)
MONOCYTES NFR BLD AUTO: 11.6 % — SIGNIFICANT CHANGE UP (ref 2–14)
MONOCYTES NFR BLD AUTO: 11.9 % — SIGNIFICANT CHANGE UP (ref 2–14)
MONOCYTES NFR BLD AUTO: 12.5 % — SIGNIFICANT CHANGE UP (ref 2–14)
MONOCYTES NFR BLD AUTO: 12.8 % — SIGNIFICANT CHANGE UP (ref 2–14)
MONOCYTES NFR BLD AUTO: 12.9 % — SIGNIFICANT CHANGE UP (ref 2–14)
MONOCYTES NFR BLD AUTO: 13.5 % — SIGNIFICANT CHANGE UP (ref 2–14)
MONOCYTES NFR BLD AUTO: 4.6 % — SIGNIFICANT CHANGE UP (ref 2–14)
MONOCYTES NFR BLD AUTO: 7 % — SIGNIFICANT CHANGE UP (ref 2–14)
MONOCYTES NFR BLD AUTO: 7.4 % — SIGNIFICANT CHANGE UP (ref 2–14)
NEUTROPHILS # BLD AUTO: 17.12 K/UL — HIGH (ref 1.8–7.4)
NEUTROPHILS # BLD AUTO: 4.57 K/UL — SIGNIFICANT CHANGE UP (ref 1.8–7.4)
NEUTROPHILS # BLD AUTO: 5.07 K/UL — SIGNIFICANT CHANGE UP (ref 1.8–7.4)
NEUTROPHILS # BLD AUTO: 5.19 K/UL — SIGNIFICANT CHANGE UP (ref 1.8–7.4)
NEUTROPHILS # BLD AUTO: 5.41 K/UL — SIGNIFICANT CHANGE UP (ref 1.8–7.4)
NEUTROPHILS # BLD AUTO: 5.48 K/UL — SIGNIFICANT CHANGE UP (ref 1.8–7.4)
NEUTROPHILS # BLD AUTO: 5.86 K/UL — SIGNIFICANT CHANGE UP (ref 1.8–7.4)
NEUTROPHILS # BLD AUTO: 6.17 K/UL — SIGNIFICANT CHANGE UP (ref 1.8–7.4)
NEUTROPHILS # BLD AUTO: 6.44 K/UL — SIGNIFICANT CHANGE UP (ref 1.8–7.4)
NEUTROPHILS # BLD AUTO: 7.02 K/UL — SIGNIFICANT CHANGE UP (ref 1.8–7.4)
NEUTROPHILS # BLD AUTO: 7.95 K/UL — HIGH (ref 1.8–7.4)
NEUTROPHILS # BLD AUTO: 7.99 K/UL — HIGH (ref 1.8–7.4)
NEUTROPHILS # BLD AUTO: 8.09 K/UL — HIGH (ref 1.8–7.4)
NEUTROPHILS # BLD AUTO: 8.44 K/UL — HIGH (ref 1.8–7.4)
NEUTROPHILS # BLD AUTO: 8.63 K/UL — HIGH (ref 1.8–7.4)
NEUTROPHILS # BLD AUTO: 9.64 K/UL — HIGH (ref 1.8–7.4)
NEUTROPHILS NFR BLD AUTO: 57.1 % — SIGNIFICANT CHANGE UP (ref 43–77)
NEUTROPHILS NFR BLD AUTO: 60.5 % — SIGNIFICANT CHANGE UP (ref 43–77)
NEUTROPHILS NFR BLD AUTO: 63.8 % — SIGNIFICANT CHANGE UP (ref 43–77)
NEUTROPHILS NFR BLD AUTO: 64.4 % — SIGNIFICANT CHANGE UP (ref 43–77)
NEUTROPHILS NFR BLD AUTO: 65.2 % — SIGNIFICANT CHANGE UP (ref 43–77)
NEUTROPHILS NFR BLD AUTO: 67 % — SIGNIFICANT CHANGE UP (ref 43–77)
NEUTROPHILS NFR BLD AUTO: 68.7 % — SIGNIFICANT CHANGE UP (ref 43–77)
NEUTROPHILS NFR BLD AUTO: 69.1 % — SIGNIFICANT CHANGE UP (ref 43–77)
NEUTROPHILS NFR BLD AUTO: 70.2 % — SIGNIFICANT CHANGE UP (ref 43–77)
NEUTROPHILS NFR BLD AUTO: 71.7 % — SIGNIFICANT CHANGE UP (ref 43–77)
NEUTROPHILS NFR BLD AUTO: 75.9 % — SIGNIFICANT CHANGE UP (ref 43–77)
NEUTROPHILS NFR BLD AUTO: 76.5 % — SIGNIFICANT CHANGE UP (ref 43–77)
NEUTROPHILS NFR BLD AUTO: 77.5 % — HIGH (ref 43–77)
NEUTROPHILS NFR BLD AUTO: 78.8 % — HIGH (ref 43–77)
NEUTROPHILS NFR BLD AUTO: 85.9 % — HIGH (ref 43–77)
NEUTROPHILS NFR BLD AUTO: 93 % — HIGH (ref 43–77)
NON HDL CHOLESTEROL: 54 MG/DL — SIGNIFICANT CHANGE UP
NRBC # BLD: 0 /100 WBCS — SIGNIFICANT CHANGE UP (ref 0–0)
NT-PROBNP SERPL-SCNC: 8543 PG/ML — HIGH (ref 0–450)
NT-PROBNP SERPL-SCNC: HIGH PG/ML (ref 0–300)
O2 CT VFR BLD CALC: 27 MMHG — LOW (ref 30–65)
O2 CT VFR BLD CALC: 28 MMHG — LOW (ref 30–65)
O2 CT VFR BLD CALC: 29 MMHG — LOW (ref 30–65)
O2 CT VFR BLD CALC: 29 MMHG — LOW (ref 30–65)
O2 CT VFR BLD CALC: 30 MMHG — SIGNIFICANT CHANGE UP (ref 30–65)
O2 CT VFR BLD CALC: 31 MMHG — SIGNIFICANT CHANGE UP (ref 30–65)
O2 CT VFR BLD CALC: 31 MMHG — SIGNIFICANT CHANGE UP (ref 30–65)
O2 CT VFR BLD CALC: 33 MMHG — SIGNIFICANT CHANGE UP (ref 30–65)
O2 CT VFR BLD CALC: 34 MMHG — SIGNIFICANT CHANGE UP (ref 30–65)
O2 CT VFR BLD CALC: 35 MMHG — SIGNIFICANT CHANGE UP (ref 30–65)
O2 CT VFR BLD CALC: 36 MMHG — SIGNIFICANT CHANGE UP (ref 30–65)
O2 CT VFR BLD CALC: 37 MMHG — SIGNIFICANT CHANGE UP (ref 30–65)
O2 CT VFR BLD CALC: 38 MMHG — SIGNIFICANT CHANGE UP (ref 30–65)
O2 CT VFR BLD CALC: 40 MMHG — SIGNIFICANT CHANGE UP (ref 30–65)
O2 CT VFR BLD CALC: 43 MMHG — SIGNIFICANT CHANGE UP (ref 30–65)
OTHER CELLS CSF MANUAL: 4 ML/DL — LOW (ref 18–22)
OTHER CELLS CSF MANUAL: 6 ML/DL — LOW (ref 18–22)
PCO2 BLDA: 28 MMHG — LOW (ref 32–46)
PCO2 BLDMV: 35 MMHG — SIGNIFICANT CHANGE UP (ref 30–65)
PCO2 BLDMV: 35 MMHG — SIGNIFICANT CHANGE UP (ref 30–65)
PCO2 BLDMV: 36 MMHG — SIGNIFICANT CHANGE UP (ref 30–65)
PCO2 BLDMV: 37 MMHG — SIGNIFICANT CHANGE UP (ref 30–65)
PCO2 BLDMV: 38 MMHG — SIGNIFICANT CHANGE UP (ref 30–65)
PCO2 BLDMV: 40 MMHG — SIGNIFICANT CHANGE UP (ref 30–65)
PCO2 BLDMV: 41 MMHG — SIGNIFICANT CHANGE UP (ref 30–65)
PCO2 BLDMV: 42 MMHG — SIGNIFICANT CHANGE UP (ref 30–65)
PCO2 BLDMV: 44 MMHG — SIGNIFICANT CHANGE UP (ref 30–65)
PCO2 BLDMV: 46 MMHG — SIGNIFICANT CHANGE UP (ref 30–65)
PCO2 BLDMV: 47 MMHG — SIGNIFICANT CHANGE UP (ref 30–65)
PCO2 BLDMV: 58 MMHG — SIGNIFICANT CHANGE UP (ref 30–65)
PCO2 BLDMV: 75 MMHG — HIGH (ref 30–65)
PCO2 BLDV: 39 MMHG — SIGNIFICANT CHANGE UP (ref 35–50)
PCO2 BLDV: 40 MMHG — SIGNIFICANT CHANGE UP (ref 35–50)
PCO2 BLDV: 41 MMHG — SIGNIFICANT CHANGE UP (ref 35–50)
PCO2 BLDV: 43 MMHG — SIGNIFICANT CHANGE UP (ref 35–50)
PCO2 BLDV: 52 MMHG — HIGH (ref 35–50)
PH BLDA: 7.46 — HIGH (ref 7.35–7.45)
PH BLDMV: 7.15 — CRITICAL LOW (ref 7.32–7.45)
PH BLDMV: 7.33 — SIGNIFICANT CHANGE UP (ref 7.32–7.45)
PH BLDMV: 7.36 — SIGNIFICANT CHANGE UP (ref 7.32–7.45)
PH BLDMV: 7.38 — SIGNIFICANT CHANGE UP (ref 7.32–7.45)
PH BLDMV: 7.39 — SIGNIFICANT CHANGE UP (ref 7.32–7.45)
PH BLDMV: 7.4 — SIGNIFICANT CHANGE UP (ref 7.32–7.45)
PH BLDMV: 7.41 — SIGNIFICANT CHANGE UP (ref 7.32–7.45)
PH BLDMV: 7.42 — SIGNIFICANT CHANGE UP (ref 7.32–7.45)
PH BLDMV: 7.43 — SIGNIFICANT CHANGE UP (ref 7.32–7.45)
PH BLDMV: 7.45 — SIGNIFICANT CHANGE UP (ref 7.32–7.45)
PH BLDV: 7.29 — LOW (ref 7.35–7.45)
PH BLDV: 7.38 — SIGNIFICANT CHANGE UP (ref 7.35–7.45)
PH BLDV: 7.38 — SIGNIFICANT CHANGE UP (ref 7.35–7.45)
PH BLDV: 7.39 — SIGNIFICANT CHANGE UP (ref 7.35–7.45)
PH BLDV: 7.4 — SIGNIFICANT CHANGE UP (ref 7.35–7.45)
PHOSPHATE SERPL-MCNC: 2.3 MG/DL — LOW (ref 2.5–4.5)
PHOSPHATE SERPL-MCNC: 2.3 MG/DL — LOW (ref 2.5–4.5)
PHOSPHATE SERPL-MCNC: 2.4 MG/DL — LOW (ref 2.5–4.5)
PHOSPHATE SERPL-MCNC: 2.4 MG/DL — LOW (ref 2.5–4.5)
PHOSPHATE SERPL-MCNC: 2.5 MG/DL — SIGNIFICANT CHANGE UP (ref 2.5–4.5)
PHOSPHATE SERPL-MCNC: 2.6 MG/DL — SIGNIFICANT CHANGE UP (ref 2.5–4.5)
PHOSPHATE SERPL-MCNC: 2.7 MG/DL — SIGNIFICANT CHANGE UP (ref 2.5–4.5)
PHOSPHATE SERPL-MCNC: 3 MG/DL — SIGNIFICANT CHANGE UP (ref 2.5–4.5)
PHOSPHATE SERPL-MCNC: 3.3 MG/DL — SIGNIFICANT CHANGE UP (ref 2.5–4.5)
PHOSPHATE SERPL-MCNC: 3.4 MG/DL — SIGNIFICANT CHANGE UP (ref 2.5–4.5)
PHOSPHATE SERPL-MCNC: 6.6 MG/DL — HIGH (ref 2.5–4.5)
PLATELET # BLD AUTO: 142 K/UL — LOW (ref 150–400)
PLATELET # BLD AUTO: 147 K/UL — LOW (ref 150–400)
PLATELET # BLD AUTO: 149 K/UL — LOW (ref 150–400)
PLATELET # BLD AUTO: 150 K/UL — SIGNIFICANT CHANGE UP (ref 150–400)
PLATELET # BLD AUTO: 156 K/UL — SIGNIFICANT CHANGE UP (ref 150–400)
PLATELET # BLD AUTO: 157 K/UL — SIGNIFICANT CHANGE UP (ref 150–400)
PLATELET # BLD AUTO: 162 K/UL — SIGNIFICANT CHANGE UP (ref 150–400)
PLATELET # BLD AUTO: 166 K/UL — SIGNIFICANT CHANGE UP (ref 150–400)
PLATELET # BLD AUTO: 169 K/UL — SIGNIFICANT CHANGE UP (ref 150–400)
PLATELET # BLD AUTO: 170 K/UL — SIGNIFICANT CHANGE UP (ref 150–400)
PLATELET # BLD AUTO: 171 K/UL — SIGNIFICANT CHANGE UP (ref 150–400)
PLATELET # BLD AUTO: 179 K/UL — SIGNIFICANT CHANGE UP (ref 150–400)
PLATELET # BLD AUTO: 180 K/UL — SIGNIFICANT CHANGE UP (ref 150–400)
PLATELET # BLD AUTO: 189 K/UL — SIGNIFICANT CHANGE UP (ref 150–400)
PLATELET # BLD AUTO: 190 K/UL — SIGNIFICANT CHANGE UP (ref 150–400)
PLATELET # BLD AUTO: 196 K/UL — SIGNIFICANT CHANGE UP (ref 150–400)
PLATELET # BLD AUTO: 201 K/UL — SIGNIFICANT CHANGE UP (ref 150–400)
PO2 BLDA: 149 MMHG — HIGH (ref 74–108)
PO2 BLDV: 30 MMHG — SIGNIFICANT CHANGE UP (ref 25–45)
PO2 BLDV: 32 MMHG — SIGNIFICANT CHANGE UP (ref 25–45)
PO2 BLDV: 34 MMHG — SIGNIFICANT CHANGE UP (ref 25–45)
PO2 BLDV: 34 MMHG — SIGNIFICANT CHANGE UP (ref 25–45)
PO2 BLDV: <20 MMHG — LOW (ref 25–45)
POTASSIUM BLDV-SCNC: 4.1 MMOL/L — SIGNIFICANT CHANGE UP (ref 3.5–5.3)
POTASSIUM BLDV-SCNC: 5.2 MMOL/L — SIGNIFICANT CHANGE UP (ref 3.5–5.3)
POTASSIUM SERPL-MCNC: 4 MMOL/L — SIGNIFICANT CHANGE UP (ref 3.5–5.3)
POTASSIUM SERPL-MCNC: 4 MMOL/L — SIGNIFICANT CHANGE UP (ref 3.5–5.3)
POTASSIUM SERPL-MCNC: 4.1 MMOL/L — SIGNIFICANT CHANGE UP (ref 3.5–5.3)
POTASSIUM SERPL-MCNC: 4.2 MMOL/L — SIGNIFICANT CHANGE UP (ref 3.5–5.3)
POTASSIUM SERPL-MCNC: 4.3 MMOL/L — SIGNIFICANT CHANGE UP (ref 3.5–5.3)
POTASSIUM SERPL-MCNC: 4.3 MMOL/L — SIGNIFICANT CHANGE UP (ref 3.5–5.3)
POTASSIUM SERPL-MCNC: 4.4 MMOL/L — SIGNIFICANT CHANGE UP (ref 3.5–5.3)
POTASSIUM SERPL-MCNC: 4.4 MMOL/L — SIGNIFICANT CHANGE UP (ref 3.5–5.3)
POTASSIUM SERPL-MCNC: 4.5 MMOL/L — SIGNIFICANT CHANGE UP (ref 3.5–5.3)
POTASSIUM SERPL-MCNC: 4.6 MMOL/L — SIGNIFICANT CHANGE UP (ref 3.5–5.3)
POTASSIUM SERPL-MCNC: 4.7 MMOL/L — SIGNIFICANT CHANGE UP (ref 3.5–5.3)
POTASSIUM SERPL-MCNC: 4.8 MMOL/L — SIGNIFICANT CHANGE UP (ref 3.5–5.3)
POTASSIUM SERPL-MCNC: 4.9 MMOL/L — SIGNIFICANT CHANGE UP (ref 3.5–5.3)
POTASSIUM SERPL-MCNC: 5.2 MMOL/L — SIGNIFICANT CHANGE UP (ref 3.5–5.3)
POTASSIUM SERPL-MCNC: 5.5 MMOL/L — HIGH (ref 3.5–5.3)
POTASSIUM SERPL-SCNC: 4 MMOL/L — SIGNIFICANT CHANGE UP (ref 3.5–5.3)
POTASSIUM SERPL-SCNC: 4 MMOL/L — SIGNIFICANT CHANGE UP (ref 3.5–5.3)
POTASSIUM SERPL-SCNC: 4.1 MMOL/L — SIGNIFICANT CHANGE UP (ref 3.5–5.3)
POTASSIUM SERPL-SCNC: 4.2 MMOL/L — SIGNIFICANT CHANGE UP (ref 3.5–5.3)
POTASSIUM SERPL-SCNC: 4.3 MMOL/L — SIGNIFICANT CHANGE UP (ref 3.5–5.3)
POTASSIUM SERPL-SCNC: 4.3 MMOL/L — SIGNIFICANT CHANGE UP (ref 3.5–5.3)
POTASSIUM SERPL-SCNC: 4.4 MMOL/L — SIGNIFICANT CHANGE UP (ref 3.5–5.3)
POTASSIUM SERPL-SCNC: 4.4 MMOL/L — SIGNIFICANT CHANGE UP (ref 3.5–5.3)
POTASSIUM SERPL-SCNC: 4.5 MMOL/L — SIGNIFICANT CHANGE UP (ref 3.5–5.3)
POTASSIUM SERPL-SCNC: 4.6 MMOL/L — SIGNIFICANT CHANGE UP (ref 3.5–5.3)
POTASSIUM SERPL-SCNC: 4.7 MMOL/L — SIGNIFICANT CHANGE UP (ref 3.5–5.3)
POTASSIUM SERPL-SCNC: 4.8 MMOL/L — SIGNIFICANT CHANGE UP (ref 3.5–5.3)
POTASSIUM SERPL-SCNC: 4.9 MMOL/L — SIGNIFICANT CHANGE UP (ref 3.5–5.3)
POTASSIUM SERPL-SCNC: 5.2 MMOL/L — SIGNIFICANT CHANGE UP (ref 3.5–5.3)
POTASSIUM SERPL-SCNC: 5.5 MMOL/L — HIGH (ref 3.5–5.3)
PROT SERPL-MCNC: 4.7 G/DL — LOW (ref 6–8.3)
PROT SERPL-MCNC: 4.8 G/DL — LOW (ref 6–8.3)
PROT SERPL-MCNC: 4.9 G/DL — LOW (ref 6–8.3)
PROT SERPL-MCNC: 4.9 G/DL — LOW (ref 6–8.3)
PROT SERPL-MCNC: 5 G/DL — LOW (ref 6–8.3)
PROT SERPL-MCNC: 5.3 G/DL — LOW (ref 6–8.3)
PROT SERPL-MCNC: 5.5 G/DL — LOW (ref 6–8.3)
PROT SERPL-MCNC: 5.5 G/DL — LOW (ref 6–8.3)
PROT SERPL-MCNC: 5.6 G/DL — LOW (ref 6–8.3)
PROT SERPL-MCNC: 6 G/DL — SIGNIFICANT CHANGE UP (ref 6–8.3)
PROT SERPL-MCNC: 6.5 G/DL — SIGNIFICANT CHANGE UP (ref 6–8.3)
PROT SERPL-MCNC: 6.7 G/DL — SIGNIFICANT CHANGE UP (ref 6–8.3)
PROT SERPL-MCNC: 6.7 G/DL — SIGNIFICANT CHANGE UP (ref 6–8.3)
PROTHROM AB SERPL-ACNC: 12.1 SEC — SIGNIFICANT CHANGE UP (ref 10.6–13.6)
PROTHROM AB SERPL-ACNC: 12.1 SEC — SIGNIFICANT CHANGE UP (ref 10.6–13.6)
PROTHROM AB SERPL-ACNC: 12.5 SEC — SIGNIFICANT CHANGE UP (ref 10.6–13.6)
PROTHROM AB SERPL-ACNC: 13.2 SEC — SIGNIFICANT CHANGE UP (ref 10.6–13.6)
PROTHROM AB SERPL-ACNC: 13.3 SEC — SIGNIFICANT CHANGE UP (ref 10.6–13.6)
PROTHROM AB SERPL-ACNC: 13.3 SEC — SIGNIFICANT CHANGE UP (ref 10.6–13.6)
PROTHROM AB SERPL-ACNC: 14.5 SEC — HIGH (ref 10.6–13.6)
PROTHROM AB SERPL-ACNC: 25.1 SEC — HIGH (ref 10.6–13.6)
PROTHROM AB SERPL-ACNC: 25.5 SEC — HIGH (ref 10.6–13.6)
PROTHROM AB SERPL-ACNC: 27.8 SEC — HIGH (ref 10.6–13.6)
PROTHROM AB SERPL-ACNC: 40 SEC — HIGH (ref 10.6–13.6)
PROTHROM AB SERPL-ACNC: 42.9 SEC — HIGH (ref 10.6–13.6)
PTH-INTACT FLD-MCNC: 193 PG/ML — HIGH (ref 15–65)
RAPID RVP RESULT: SIGNIFICANT CHANGE UP
RBC # BLD: 2.34 M/UL — LOW (ref 3.8–5.2)
RBC # BLD: 2.52 M/UL — LOW (ref 3.8–5.2)
RBC # BLD: 2.72 M/UL — LOW (ref 3.8–5.2)
RBC # BLD: 2.76 M/UL — LOW (ref 3.8–5.2)
RBC # BLD: 2.84 M/UL — LOW (ref 3.8–5.2)
RBC # BLD: 2.97 M/UL — LOW (ref 3.8–5.2)
RBC # BLD: 2.97 M/UL — LOW (ref 3.8–5.2)
RBC # BLD: 3.21 M/UL — LOW (ref 3.8–5.2)
RBC # BLD: 3.24 M/UL — LOW (ref 3.8–5.2)
RBC # BLD: 3.24 M/UL — LOW (ref 3.8–5.2)
RBC # BLD: 3.26 M/UL — LOW (ref 3.8–5.2)
RBC # BLD: 3.37 M/UL — LOW (ref 3.8–5.2)
RBC # BLD: 3.48 M/UL — LOW (ref 3.8–5.2)
RBC # BLD: 3.86 M/UL — SIGNIFICANT CHANGE UP (ref 3.8–5.2)
RBC # BLD: 3.99 M/UL — SIGNIFICANT CHANGE UP (ref 3.8–5.2)
RBC # BLD: 4.07 M/UL — SIGNIFICANT CHANGE UP (ref 3.8–5.2)
RBC # BLD: 4.08 M/UL — SIGNIFICANT CHANGE UP (ref 3.8–5.2)
RBC # FLD: 13.6 % — SIGNIFICANT CHANGE UP (ref 10.3–14.5)
RBC # FLD: 14.1 % — SIGNIFICANT CHANGE UP (ref 10.3–14.5)
RBC # FLD: 14.3 % — SIGNIFICANT CHANGE UP (ref 10.3–14.5)
RBC # FLD: 14.3 % — SIGNIFICANT CHANGE UP (ref 10.3–14.5)
RBC # FLD: 14.4 % — SIGNIFICANT CHANGE UP (ref 10.3–14.5)
RBC # FLD: 14.5 % — SIGNIFICANT CHANGE UP (ref 10.3–14.5)
RBC # FLD: 14.6 % — HIGH (ref 10.3–14.5)
RBC # FLD: 15 % — HIGH (ref 10.3–14.5)
RH IG SCN BLD-IMP: POSITIVE — SIGNIFICANT CHANGE UP
SAO2 % BLDA: 100 % — HIGH (ref 92–96)
SAO2 % BLDMV: 42 % — LOW (ref 60–90)
SAO2 % BLDMV: 42 % — LOW (ref 60–90)
SAO2 % BLDMV: 44 % — LOW (ref 60–90)
SAO2 % BLDMV: 45 % — LOW (ref 60–90)
SAO2 % BLDMV: 45 % — LOW (ref 60–90)
SAO2 % BLDMV: 47 % — LOW (ref 60–90)
SAO2 % BLDMV: 48 % — LOW (ref 60–90)
SAO2 % BLDMV: 51 % — LOW (ref 60–90)
SAO2 % BLDMV: 51 % — LOW (ref 60–90)
SAO2 % BLDMV: 54 % — LOW (ref 60–90)
SAO2 % BLDMV: 56 % — LOW (ref 60–90)
SAO2 % BLDMV: 56 % — LOW (ref 60–90)
SAO2 % BLDMV: 57 % — LOW (ref 60–90)
SAO2 % BLDMV: 60 % — SIGNIFICANT CHANGE UP (ref 60–90)
SAO2 % BLDMV: 62 % — SIGNIFICANT CHANGE UP (ref 60–90)
SAO2 % BLDMV: 62 % — SIGNIFICANT CHANGE UP (ref 60–90)
SAO2 % BLDMV: 65 % — SIGNIFICANT CHANGE UP (ref 60–90)
SAO2 % BLDMV: 67 % — SIGNIFICANT CHANGE UP (ref 60–90)
SAO2 % BLDMV: 68 % — SIGNIFICANT CHANGE UP (ref 60–90)
SAO2 % BLDV: 19 % — LOW (ref 67–88)
SAO2 % BLDV: 48 % — LOW (ref 67–88)
SAO2 % BLDV: 51 % — LOW (ref 67–88)
SAO2 % BLDV: 57 % — LOW (ref 67–88)
SAO2 % BLDV: 58 % — LOW (ref 67–88)
SARS-COV-2 IGG+IGM SERPL QL IA: 0.1 INDEX — SIGNIFICANT CHANGE UP
SARS-COV-2 IGG+IGM SERPL QL IA: >250 U/ML — HIGH
SARS-COV-2 IGG+IGM SERPL QL IA: NEGATIVE — SIGNIFICANT CHANGE UP
SARS-COV-2 IGG+IGM SERPL QL IA: POSITIVE
SARS-COV-2 RNA SPEC QL NAA+PROBE: SIGNIFICANT CHANGE UP
SODIUM SERPL-SCNC: 132 MMOL/L — LOW (ref 135–145)
SODIUM SERPL-SCNC: 132 MMOL/L — LOW (ref 135–145)
SODIUM SERPL-SCNC: 133 MMOL/L — LOW (ref 135–145)
SODIUM SERPL-SCNC: 133 MMOL/L — LOW (ref 135–145)
SODIUM SERPL-SCNC: 134 MMOL/L — LOW (ref 135–145)
SODIUM SERPL-SCNC: 135 MMOL/L — SIGNIFICANT CHANGE UP (ref 135–145)
SODIUM SERPL-SCNC: 136 MMOL/L — SIGNIFICANT CHANGE UP (ref 135–145)
SODIUM SERPL-SCNC: 137 MMOL/L — SIGNIFICANT CHANGE UP (ref 135–145)
SODIUM SERPL-SCNC: 137 MMOL/L — SIGNIFICANT CHANGE UP (ref 135–145)
SODIUM SERPL-SCNC: 139 MMOL/L — SIGNIFICANT CHANGE UP (ref 135–145)
SODIUM SERPL-SCNC: 139 MMOL/L — SIGNIFICANT CHANGE UP (ref 135–145)
TRIGL SERPL-MCNC: 89 MG/DL — SIGNIFICANT CHANGE UP
TROPONIN I SERPL-MCNC: >40 NG/ML — HIGH (ref 0.02–0.06)
TROPONIN T, HIGH SENSITIVITY RESULT: 8424 NG/L — HIGH (ref 0–51)
TROPONIN T, HIGH SENSITIVITY RESULT: 9486 NG/L — HIGH (ref 0–51)
TROPONIN T, HIGH SENSITIVITY RESULT: HIGH NG/L (ref 0–51)
TSH SERPL-MCNC: 2.32 UIU/ML — SIGNIFICANT CHANGE UP (ref 0.27–4.2)
WBC # BLD: 10.51 K/UL — HIGH (ref 3.8–10.5)
WBC # BLD: 10.95 K/UL — HIGH (ref 3.8–10.5)
WBC # BLD: 11.09 K/UL — HIGH (ref 3.8–10.5)
WBC # BLD: 11.69 K/UL — HIGH (ref 3.8–10.5)
WBC # BLD: 12.59 K/UL — HIGH (ref 3.8–10.5)
WBC # BLD: 18.08 K/UL — HIGH (ref 3.8–10.5)
WBC # BLD: 7.43 K/UL — SIGNIFICANT CHANGE UP (ref 3.8–10.5)
WBC # BLD: 7.94 K/UL — SIGNIFICANT CHANGE UP (ref 3.8–10.5)
WBC # BLD: 7.97 K/UL — SIGNIFICANT CHANGE UP (ref 3.8–10.5)
WBC # BLD: 7.99 K/UL — SIGNIFICANT CHANGE UP (ref 3.8–10.5)
WBC # BLD: 8.08 K/UL — SIGNIFICANT CHANGE UP (ref 3.8–10.5)
WBC # BLD: 8.49 K/UL — SIGNIFICANT CHANGE UP (ref 3.8–10.5)
WBC # BLD: 8.97 K/UL — SIGNIFICANT CHANGE UP (ref 3.8–10.5)
WBC # BLD: 9.05 K/UL — SIGNIFICANT CHANGE UP (ref 3.8–10.5)
WBC # BLD: 9.17 K/UL — SIGNIFICANT CHANGE UP (ref 3.8–10.5)
WBC # BLD: 9.69 K/UL — SIGNIFICANT CHANGE UP (ref 3.8–10.5)
WBC # BLD: 9.82 K/UL — SIGNIFICANT CHANGE UP (ref 3.8–10.5)
WBC # FLD AUTO: 10.51 K/UL — HIGH (ref 3.8–10.5)
WBC # FLD AUTO: 10.95 K/UL — HIGH (ref 3.8–10.5)
WBC # FLD AUTO: 11.09 K/UL — HIGH (ref 3.8–10.5)
WBC # FLD AUTO: 11.69 K/UL — HIGH (ref 3.8–10.5)
WBC # FLD AUTO: 12.59 K/UL — HIGH (ref 3.8–10.5)
WBC # FLD AUTO: 18.08 K/UL — HIGH (ref 3.8–10.5)
WBC # FLD AUTO: 7.43 K/UL — SIGNIFICANT CHANGE UP (ref 3.8–10.5)
WBC # FLD AUTO: 7.94 K/UL — SIGNIFICANT CHANGE UP (ref 3.8–10.5)
WBC # FLD AUTO: 7.97 K/UL — SIGNIFICANT CHANGE UP (ref 3.8–10.5)
WBC # FLD AUTO: 7.99 K/UL — SIGNIFICANT CHANGE UP (ref 3.8–10.5)
WBC # FLD AUTO: 8.08 K/UL — SIGNIFICANT CHANGE UP (ref 3.8–10.5)
WBC # FLD AUTO: 8.49 K/UL — SIGNIFICANT CHANGE UP (ref 3.8–10.5)
WBC # FLD AUTO: 8.97 K/UL — SIGNIFICANT CHANGE UP (ref 3.8–10.5)
WBC # FLD AUTO: 9.05 K/UL — SIGNIFICANT CHANGE UP (ref 3.8–10.5)
WBC # FLD AUTO: 9.17 K/UL — SIGNIFICANT CHANGE UP (ref 3.8–10.5)
WBC # FLD AUTO: 9.69 K/UL — SIGNIFICANT CHANGE UP (ref 3.8–10.5)
WBC # FLD AUTO: 9.82 K/UL — SIGNIFICANT CHANGE UP (ref 3.8–10.5)

## 2021-01-01 PROCEDURE — 99152 MOD SED SAME PHYS/QHP 5/>YRS: CPT

## 2021-01-01 PROCEDURE — 92986 REVISION OF AORTIC VALVE: CPT

## 2021-01-01 PROCEDURE — 93454 CORONARY ARTERY ANGIO S&I: CPT | Mod: 26

## 2021-01-01 PROCEDURE — 36620 INSERTION CATHETER ARTERY: CPT

## 2021-01-01 PROCEDURE — 82553 CREATINE MB FRACTION: CPT

## 2021-01-01 PROCEDURE — 99291 CRITICAL CARE FIRST HOUR: CPT

## 2021-01-01 PROCEDURE — 97162 PT EVAL MOD COMPLEX 30 MIN: CPT

## 2021-01-01 PROCEDURE — 71275 CT ANGIOGRAPHY CHEST: CPT

## 2021-01-01 PROCEDURE — 99233 SBSQ HOSP IP/OBS HIGH 50: CPT

## 2021-01-01 PROCEDURE — 84484 ASSAY OF TROPONIN QUANT: CPT

## 2021-01-01 PROCEDURE — 85025 COMPLETE CBC W/AUTO DIFF WBC: CPT

## 2021-01-01 PROCEDURE — 36415 COLL VENOUS BLD VENIPUNCTURE: CPT

## 2021-01-01 PROCEDURE — 99291 CRITICAL CARE FIRST HOUR: CPT | Mod: 25

## 2021-01-01 PROCEDURE — 94660 CPAP INITIATION&MGMT: CPT

## 2021-01-01 PROCEDURE — C1887: CPT

## 2021-01-01 PROCEDURE — 83880 ASSAY OF NATRIURETIC PEPTIDE: CPT

## 2021-01-01 PROCEDURE — 71045 X-RAY EXAM CHEST 1 VIEW: CPT | Mod: 26

## 2021-01-01 PROCEDURE — 36556 INSERT NON-TUNNEL CV CATH: CPT

## 2021-01-01 PROCEDURE — 71045 X-RAY EXAM CHEST 1 VIEW: CPT | Mod: 26,77

## 2021-01-01 PROCEDURE — 82947 ASSAY GLUCOSE BLOOD QUANT: CPT

## 2021-01-01 PROCEDURE — 85730 THROMBOPLASTIN TIME PARTIAL: CPT

## 2021-01-01 PROCEDURE — 84295 ASSAY OF SERUM SODIUM: CPT

## 2021-01-01 PROCEDURE — 93454 CORONARY ARTERY ANGIO S&I: CPT

## 2021-01-01 PROCEDURE — 93010 ELECTROCARDIOGRAM REPORT: CPT | Mod: 76

## 2021-01-01 PROCEDURE — 70450 CT HEAD/BRAIN W/O DYE: CPT | Mod: 26

## 2021-01-01 PROCEDURE — 93503 INSERT/PLACE HEART CATHETER: CPT

## 2021-01-01 PROCEDURE — 99292 CRITICAL CARE ADDL 30 MIN: CPT | Mod: 25

## 2021-01-01 PROCEDURE — 83690 ASSAY OF LIPASE: CPT

## 2021-01-01 PROCEDURE — 94003 VENT MGMT INPAT SUBQ DAY: CPT

## 2021-01-01 PROCEDURE — 71045 X-RAY EXAM CHEST 1 VIEW: CPT | Mod: 26,77,76

## 2021-01-01 PROCEDURE — 82306 VITAMIN D 25 HYDROXY: CPT

## 2021-01-01 PROCEDURE — 82330 ASSAY OF CALCIUM: CPT

## 2021-01-01 PROCEDURE — 83735 ASSAY OF MAGNESIUM: CPT

## 2021-01-01 PROCEDURE — 93005 ELECTROCARDIOGRAM TRACING: CPT

## 2021-01-01 PROCEDURE — 93010 ELECTROCARDIOGRAM REPORT: CPT

## 2021-01-01 PROCEDURE — 85027 COMPLETE CBC AUTOMATED: CPT

## 2021-01-01 PROCEDURE — C8929: CPT

## 2021-01-01 PROCEDURE — 99292 CRITICAL CARE ADDL 30 MIN: CPT

## 2021-01-01 PROCEDURE — 99223 1ST HOSP IP/OBS HIGH 75: CPT | Mod: 25

## 2021-01-01 PROCEDURE — 83036 HEMOGLOBIN GLYCOSYLATED A1C: CPT

## 2021-01-01 PROCEDURE — 96375 TX/PRO/DX INJ NEW DRUG ADDON: CPT

## 2021-01-01 PROCEDURE — 82310 ASSAY OF CALCIUM: CPT

## 2021-01-01 PROCEDURE — 96376 TX/PRO/DX INJ SAME DRUG ADON: CPT

## 2021-01-01 PROCEDURE — 83605 ASSAY OF LACTIC ACID: CPT

## 2021-01-01 PROCEDURE — 80053 COMPREHEN METABOLIC PANEL: CPT

## 2021-01-01 PROCEDURE — 82533 TOTAL CORTISOL: CPT

## 2021-01-01 PROCEDURE — 72125 CT NECK SPINE W/O DYE: CPT | Mod: 26

## 2021-01-01 PROCEDURE — 80061 LIPID PANEL: CPT

## 2021-01-01 PROCEDURE — 93306 TTE W/DOPPLER COMPLETE: CPT | Mod: 26

## 2021-01-01 PROCEDURE — 85610 PROTHROMBIN TIME: CPT

## 2021-01-01 PROCEDURE — 86850 RBC ANTIBODY SCREEN: CPT

## 2021-01-01 PROCEDURE — 33967 INSERT I-AORT PERCUT DEVICE: CPT

## 2021-01-01 PROCEDURE — 72125 CT NECK SPINE W/O DYE: CPT

## 2021-01-01 PROCEDURE — 85014 HEMATOCRIT: CPT

## 2021-01-01 PROCEDURE — 85379 FIBRIN DEGRADATION QUANT: CPT

## 2021-01-01 PROCEDURE — 82550 ASSAY OF CK (CPK): CPT

## 2021-01-01 PROCEDURE — 86900 BLOOD TYPING SEROLOGIC ABO: CPT

## 2021-01-01 PROCEDURE — 93308 TTE F-UP OR LMTD: CPT

## 2021-01-01 PROCEDURE — 70450 CT HEAD/BRAIN W/O DYE: CPT

## 2021-01-01 PROCEDURE — 93321 DOPPLER ECHO F-UP/LMTD STD: CPT | Mod: 26

## 2021-01-01 PROCEDURE — 99233 SBSQ HOSP IP/OBS HIGH 50: CPT | Mod: 57

## 2021-01-01 PROCEDURE — 36556 INSERT NON-TUNNEL CV CATH: CPT | Mod: 59

## 2021-01-01 PROCEDURE — 86923 COMPATIBILITY TEST ELECTRIC: CPT

## 2021-01-01 PROCEDURE — 93321 DOPPLER ECHO F-UP/LMTD STD: CPT

## 2021-01-01 PROCEDURE — C1725: CPT

## 2021-01-01 PROCEDURE — 96374 THER/PROPH/DIAG INJ IV PUSH: CPT

## 2021-01-01 PROCEDURE — 71045 X-RAY EXAM CHEST 1 VIEW: CPT

## 2021-01-01 PROCEDURE — C1760: CPT

## 2021-01-01 PROCEDURE — C1769: CPT

## 2021-01-01 PROCEDURE — 84443 ASSAY THYROID STIM HORMONE: CPT

## 2021-01-01 PROCEDURE — 86769 SARS-COV-2 COVID-19 ANTIBODY: CPT

## 2021-01-01 PROCEDURE — 76000 FLUOROSCOPY <1 HR PHYS/QHP: CPT

## 2021-01-01 PROCEDURE — 92960 CARDIOVERSION ELECTRIC EXT: CPT

## 2021-01-01 PROCEDURE — 74174 CTA ABD&PLVS W/CONTRAST: CPT

## 2021-01-01 PROCEDURE — 93308 TTE F-UP OR LMTD: CPT | Mod: 26

## 2021-01-01 PROCEDURE — P9016: CPT

## 2021-01-01 PROCEDURE — 93458 L HRT ARTERY/VENTRICLE ANGIO: CPT | Mod: 26,59

## 2021-01-01 PROCEDURE — 83970 ASSAY OF PARATHORMONE: CPT

## 2021-01-01 PROCEDURE — C1894: CPT

## 2021-01-01 PROCEDURE — 82803 BLOOD GASES ANY COMBINATION: CPT

## 2021-01-01 PROCEDURE — 0225U NFCT DS DNA&RNA 21 SARSCOV2: CPT

## 2021-01-01 PROCEDURE — 85018 HEMOGLOBIN: CPT

## 2021-01-01 PROCEDURE — 84100 ASSAY OF PHOSPHORUS: CPT

## 2021-01-01 PROCEDURE — 71275 CT ANGIOGRAPHY CHEST: CPT | Mod: 26,MB

## 2021-01-01 PROCEDURE — C1889: CPT

## 2021-01-01 PROCEDURE — 82435 ASSAY OF BLOOD CHLORIDE: CPT

## 2021-01-01 PROCEDURE — 93306 TTE W/DOPPLER COMPLETE: CPT

## 2021-01-01 PROCEDURE — 71275 CT ANGIOGRAPHY CHEST: CPT | Mod: 26,MA,77

## 2021-01-01 PROCEDURE — 86901 BLOOD TYPING SEROLOGIC RH(D): CPT

## 2021-01-01 PROCEDURE — 84132 ASSAY OF SERUM POTASSIUM: CPT

## 2021-01-01 PROCEDURE — 74174 CTA ABD&PLVS W/CONTRAST: CPT | Mod: 26,MA

## 2021-01-01 PROCEDURE — 82565 ASSAY OF CREATININE: CPT

## 2021-01-01 RX ORDER — DOBUTAMINE HCL 250MG/20ML
2.82 VIAL (ML) INTRAVENOUS
Qty: 500 | Refills: 0 | Status: DISCONTINUED | OUTPATIENT
Start: 2021-01-01 | End: 2021-01-01

## 2021-01-01 RX ORDER — SODIUM CHLORIDE 9 MG/ML
500 INJECTION INTRAMUSCULAR; INTRAVENOUS; SUBCUTANEOUS ONCE
Refills: 0 | Status: COMPLETED | OUTPATIENT
Start: 2021-01-01 | End: 2021-01-01

## 2021-01-01 RX ORDER — ATORVASTATIN CALCIUM 80 MG/1
40 TABLET, FILM COATED ORAL AT BEDTIME
Refills: 0 | Status: DISCONTINUED | OUTPATIENT
Start: 2021-01-01 | End: 2021-01-01

## 2021-01-01 RX ORDER — SODIUM ZIRCONIUM CYCLOSILICATE 10 G/10G
10 POWDER, FOR SUSPENSION ORAL ONCE
Refills: 0 | Status: COMPLETED | OUTPATIENT
Start: 2021-01-01 | End: 2021-01-01

## 2021-01-01 RX ORDER — METOPROLOL TARTRATE 50 MG
25 TABLET ORAL
Refills: 0 | Status: DISCONTINUED | OUTPATIENT
Start: 2021-01-01 | End: 2021-01-01

## 2021-01-01 RX ORDER — POLYETHYLENE GLYCOL 3350 17 G/17G
17 POWDER, FOR SOLUTION ORAL DAILY
Refills: 0 | Status: DISCONTINUED | OUTPATIENT
Start: 2021-01-01 | End: 2021-01-01

## 2021-01-01 RX ORDER — NOREPINEPHRINE BITARTRATE/D5W 8 MG/250ML
0.05 PLASTIC BAG, INJECTION (ML) INTRAVENOUS
Qty: 8 | Refills: 0 | Status: DISCONTINUED | OUTPATIENT
Start: 2021-01-01 | End: 2021-01-01

## 2021-01-01 RX ORDER — ACETAMINOPHEN 500 MG
1000 TABLET ORAL ONCE
Refills: 0 | Status: COMPLETED | OUTPATIENT
Start: 2021-01-01 | End: 2021-01-01

## 2021-01-01 RX ORDER — MAGNESIUM SULFATE 500 MG/ML
2 VIAL (ML) INJECTION ONCE
Refills: 0 | Status: COMPLETED | OUTPATIENT
Start: 2021-01-01 | End: 2021-01-01

## 2021-01-01 RX ORDER — SODIUM CHLORIDE 9 MG/ML
1000 INJECTION INTRAMUSCULAR; INTRAVENOUS; SUBCUTANEOUS
Refills: 0 | Status: DISCONTINUED | OUTPATIENT
Start: 2021-01-01 | End: 2021-01-01

## 2021-01-01 RX ORDER — ATORVASTATIN CALCIUM 80 MG/1
80 TABLET, FILM COATED ORAL AT BEDTIME
Refills: 0 | Status: DISCONTINUED | OUTPATIENT
Start: 2021-01-01 | End: 2021-01-01

## 2021-01-01 RX ORDER — ASPIRIN/CALCIUM CARB/MAGNESIUM 324 MG
324 TABLET ORAL ONCE
Refills: 0 | Status: COMPLETED | OUTPATIENT
Start: 2021-01-01 | End: 2021-01-01

## 2021-01-01 RX ORDER — MORPHINE SULFATE 50 MG/1
2 CAPSULE, EXTENDED RELEASE ORAL ONCE
Refills: 0 | Status: DISCONTINUED | OUTPATIENT
Start: 2021-01-01 | End: 2021-01-01

## 2021-01-01 RX ORDER — VASOPRESSIN 20 [USP'U]/ML
0.04 INJECTION INTRAVENOUS
Qty: 50 | Refills: 0 | Status: DISCONTINUED | OUTPATIENT
Start: 2021-01-01 | End: 2021-01-01

## 2021-01-01 RX ORDER — SENNA PLUS 8.6 MG/1
2 TABLET ORAL AT BEDTIME
Refills: 0 | Status: DISCONTINUED | OUTPATIENT
Start: 2021-01-01 | End: 2021-01-01

## 2021-01-01 RX ORDER — LIDOCAINE 4 G/100G
1 CREAM TOPICAL DAILY
Refills: 0 | Status: DISCONTINUED | OUTPATIENT
Start: 2021-01-01 | End: 2021-01-01

## 2021-01-01 RX ORDER — QUETIAPINE FUMARATE 200 MG/1
12.5 TABLET, FILM COATED ORAL ONCE
Refills: 0 | Status: COMPLETED | OUTPATIENT
Start: 2021-01-01 | End: 2021-01-01

## 2021-01-01 RX ORDER — NOREPINEPHRINE BITARTRATE/D5W 8 MG/250ML
0.2 PLASTIC BAG, INJECTION (ML) INTRAVENOUS
Qty: 16 | Refills: 0 | Status: DISCONTINUED | OUTPATIENT
Start: 2021-01-01 | End: 2021-01-01

## 2021-01-01 RX ORDER — DOBUTAMINE HCL 250MG/20ML
4 VIAL (ML) INTRAVENOUS
Qty: 500 | Refills: 0 | Status: DISCONTINUED | OUTPATIENT
Start: 2021-01-01 | End: 2021-01-01

## 2021-01-01 RX ORDER — MAGNESIUM SULFATE 500 MG/ML
1 VIAL (ML) INJECTION ONCE
Refills: 0 | Status: COMPLETED | OUTPATIENT
Start: 2021-01-01 | End: 2021-01-01

## 2021-01-01 RX ORDER — ONDANSETRON 8 MG/1
4 TABLET, FILM COATED ORAL ONCE
Refills: 0 | Status: COMPLETED | OUTPATIENT
Start: 2021-01-01 | End: 2021-01-01

## 2021-01-01 RX ORDER — DIGOXIN 250 MCG
250 TABLET ORAL ONCE
Refills: 0 | Status: DISCONTINUED | OUTPATIENT
Start: 2021-01-01 | End: 2021-01-01

## 2021-01-01 RX ORDER — MILRINONE LACTATE 1 MG/ML
0.5 INJECTION, SOLUTION INTRAVENOUS
Qty: 20 | Refills: 0 | Status: DISCONTINUED | OUTPATIENT
Start: 2021-01-01 | End: 2021-01-01

## 2021-01-01 RX ORDER — SODIUM CHLORIDE 9 MG/ML
250 INJECTION INTRAMUSCULAR; INTRAVENOUS; SUBCUTANEOUS ONCE
Refills: 0 | Status: COMPLETED | OUTPATIENT
Start: 2021-01-01 | End: 2021-01-01

## 2021-01-01 RX ORDER — LANOLIN ALCOHOL/MO/W.PET/CERES
5 CREAM (GRAM) TOPICAL AT BEDTIME
Refills: 0 | Status: DISCONTINUED | OUTPATIENT
Start: 2021-01-01 | End: 2021-01-01

## 2021-01-01 RX ORDER — ASPIRIN/CALCIUM CARB/MAGNESIUM 324 MG
81 TABLET ORAL DAILY
Refills: 0 | Status: DISCONTINUED | OUTPATIENT
Start: 2021-01-01 | End: 2021-01-01

## 2021-01-01 RX ORDER — LISINOPRIL 2.5 MG/1
1 TABLET ORAL
Qty: 0 | Refills: 0 | DISCHARGE

## 2021-01-01 RX ORDER — PHYTONADIONE (VIT K1) 5 MG
10 TABLET ORAL ONCE
Refills: 0 | Status: COMPLETED | OUTPATIENT
Start: 2021-01-01 | End: 2021-01-01

## 2021-01-01 RX ORDER — SODIUM CHLORIDE 9 MG/ML
1000 INJECTION INTRAMUSCULAR; INTRAVENOUS; SUBCUTANEOUS ONCE
Refills: 0 | Status: COMPLETED | OUTPATIENT
Start: 2021-01-01 | End: 2021-01-01

## 2021-01-01 RX ORDER — HYDROMORPHONE HYDROCHLORIDE 2 MG/ML
0.5 INJECTION INTRAMUSCULAR; INTRAVENOUS; SUBCUTANEOUS ONCE
Refills: 0 | Status: DISCONTINUED | OUTPATIENT
Start: 2021-01-01 | End: 2021-01-01

## 2021-01-01 RX ORDER — SODIUM CHLORIDE 9 MG/ML
10 INJECTION INTRAMUSCULAR; INTRAVENOUS; SUBCUTANEOUS
Refills: 0 | Status: DISCONTINUED | OUTPATIENT
Start: 2021-01-01 | End: 2021-01-01

## 2021-01-01 RX ORDER — SODIUM BICARBONATE 1 MEQ/ML
650 SYRINGE (ML) INTRAVENOUS THREE TIMES A DAY
Refills: 0 | Status: DISCONTINUED | OUTPATIENT
Start: 2021-01-01 | End: 2021-01-01

## 2021-01-01 RX ORDER — PROTHROMBIN COMPLEX CONCENTRATE (HUMAN) 25.5; 16.5; 24; 22; 22; 26 [IU]/ML; [IU]/ML; [IU]/ML; [IU]/ML; [IU]/ML; [IU]/ML
1500 POWDER, FOR SOLUTION INTRAVENOUS ONCE
Refills: 0 | Status: COMPLETED | OUTPATIENT
Start: 2021-01-01 | End: 2021-01-01

## 2021-01-01 RX ORDER — LANOLIN ALCOHOL/MO/W.PET/CERES
3 CREAM (GRAM) TOPICAL AT BEDTIME
Refills: 0 | Status: DISCONTINUED | OUTPATIENT
Start: 2021-01-01 | End: 2021-01-01

## 2021-01-01 RX ORDER — ACETAMINOPHEN 500 MG
650 TABLET ORAL ONCE
Refills: 0 | Status: COMPLETED | OUTPATIENT
Start: 2021-01-01 | End: 2021-01-01

## 2021-01-01 RX ORDER — MORPHINE SULFATE 50 MG/1
1 CAPSULE, EXTENDED RELEASE ORAL ONCE
Refills: 0 | Status: DISCONTINUED | OUTPATIENT
Start: 2021-01-01 | End: 2021-01-01

## 2021-01-01 RX ORDER — HEPARIN SODIUM 5000 [USP'U]/ML
1000 INJECTION INTRAVENOUS; SUBCUTANEOUS
Qty: 25000 | Refills: 0 | Status: DISCONTINUED | OUTPATIENT
Start: 2021-01-01 | End: 2021-01-01

## 2021-01-01 RX ORDER — FUROSEMIDE 40 MG
40 TABLET ORAL ONCE
Refills: 0 | Status: COMPLETED | OUTPATIENT
Start: 2021-01-01 | End: 2021-01-01

## 2021-01-01 RX ORDER — FENTANYL CITRATE 50 UG/ML
75 INJECTION INTRAVENOUS ONCE
Refills: 0 | Status: DISCONTINUED | OUTPATIENT
Start: 2021-01-01 | End: 2021-01-01

## 2021-01-01 RX ORDER — TICAGRELOR 90 MG/1
90 TABLET ORAL EVERY 12 HOURS
Refills: 0 | Status: DISCONTINUED | OUTPATIENT
Start: 2021-01-01 | End: 2021-01-01

## 2021-01-01 RX ORDER — HEPARIN SODIUM 5000 [USP'U]/ML
800 INJECTION INTRAVENOUS; SUBCUTANEOUS
Qty: 25000 | Refills: 0 | Status: DISCONTINUED | OUTPATIENT
Start: 2021-01-01 | End: 2021-01-01

## 2021-01-01 RX ORDER — METOPROLOL TARTRATE 50 MG
12.5 TABLET ORAL
Refills: 0 | Status: DISCONTINUED | OUTPATIENT
Start: 2021-01-01 | End: 2021-01-01

## 2021-01-01 RX ORDER — LANOLIN ALCOHOL/MO/W.PET/CERES
6 CREAM (GRAM) TOPICAL AT BEDTIME
Refills: 0 | Status: DISCONTINUED | OUTPATIENT
Start: 2021-01-01 | End: 2021-01-01

## 2021-01-01 RX ORDER — ACETAMINOPHEN 500 MG
650 TABLET ORAL EVERY 6 HOURS
Refills: 0 | Status: DISCONTINUED | OUTPATIENT
Start: 2021-01-01 | End: 2021-01-01

## 2021-01-01 RX ORDER — METOPROLOL TARTRATE 50 MG
1 TABLET ORAL
Qty: 0 | Refills: 0 | DISCHARGE

## 2021-01-01 RX ORDER — MECLIZINE HCL 12.5 MG
1 TABLET ORAL
Qty: 0 | Refills: 0 | DISCHARGE

## 2021-01-01 RX ORDER — QUETIAPINE FUMARATE 200 MG/1
12.5 TABLET, FILM COATED ORAL AT BEDTIME
Refills: 0 | Status: DISCONTINUED | OUTPATIENT
Start: 2021-01-01 | End: 2021-01-01

## 2021-01-01 RX ORDER — DOBUTAMINE HCL 250MG/20ML
5 VIAL (ML) INTRAVENOUS
Qty: 500 | Refills: 0 | Status: DISCONTINUED | OUTPATIENT
Start: 2021-01-01 | End: 2021-01-01

## 2021-01-01 RX ORDER — CHLORHEXIDINE GLUCONATE 213 G/1000ML
1 SOLUTION TOPICAL DAILY
Refills: 0 | Status: DISCONTINUED | OUTPATIENT
Start: 2021-01-01 | End: 2021-01-01

## 2021-01-01 RX ORDER — TICAGRELOR 90 MG/1
180 TABLET ORAL ONCE
Refills: 0 | Status: COMPLETED | OUTPATIENT
Start: 2021-01-01 | End: 2021-01-01

## 2021-01-01 RX ORDER — FENTANYL CITRATE 50 UG/ML
12.5 INJECTION INTRAVENOUS ONCE
Refills: 0 | Status: DISCONTINUED | OUTPATIENT
Start: 2021-01-01 | End: 2021-01-01

## 2021-01-01 RX ORDER — NITROGLYCERIN 6.5 MG
10 CAPSULE, EXTENDED RELEASE ORAL
Qty: 50 | Refills: 0 | Status: DISCONTINUED | OUTPATIENT
Start: 2021-01-01 | End: 2021-01-01

## 2021-01-01 RX ORDER — MAGNESIUM SULFATE 500 MG/ML
2 VIAL (ML) INJECTION ONCE
Refills: 0 | Status: DISCONTINUED | OUTPATIENT
Start: 2021-01-01 | End: 2021-01-01

## 2021-01-01 RX ORDER — CHLORHEXIDINE GLUCONATE 213 G/1000ML
15 SOLUTION TOPICAL EVERY 12 HOURS
Refills: 0 | Status: DISCONTINUED | OUTPATIENT
Start: 2021-01-01 | End: 2021-01-01

## 2021-01-01 RX ORDER — ENOXAPARIN SODIUM 100 MG/ML
60 INJECTION SUBCUTANEOUS ONCE
Refills: 0 | Status: COMPLETED | OUTPATIENT
Start: 2021-01-01 | End: 2021-01-01

## 2021-01-01 RX ORDER — QUETIAPINE FUMARATE 200 MG/1
25 TABLET, FILM COATED ORAL AT BEDTIME
Refills: 0 | Status: DISCONTINUED | OUTPATIENT
Start: 2021-01-01 | End: 2021-01-01

## 2021-01-01 RX ORDER — FUROSEMIDE 40 MG
20 TABLET ORAL ONCE
Refills: 0 | Status: COMPLETED | OUTPATIENT
Start: 2021-01-01 | End: 2021-01-01

## 2021-01-01 RX ORDER — FUROSEMIDE 40 MG
60 TABLET ORAL ONCE
Refills: 0 | Status: COMPLETED | OUTPATIENT
Start: 2021-01-01 | End: 2021-01-01

## 2021-01-01 RX ADMIN — Medication 650 MILLIGRAM(S): at 20:11

## 2021-01-01 RX ADMIN — FENTANYL CITRATE 12.5 MICROGRAM(S): 50 INJECTION INTRAVENOUS at 11:15

## 2021-01-01 RX ADMIN — POLYETHYLENE GLYCOL 3350 17 GRAM(S): 17 POWDER, FOR SOLUTION ORAL at 12:24

## 2021-01-01 RX ADMIN — CHLORHEXIDINE GLUCONATE 1 APPLICATION(S): 213 SOLUTION TOPICAL at 21:55

## 2021-01-01 RX ADMIN — LIDOCAINE 1 PATCH: 4 CREAM TOPICAL at 11:09

## 2021-01-01 RX ADMIN — SODIUM CHLORIDE 500 MILLILITER(S): 9 INJECTION INTRAMUSCULAR; INTRAVENOUS; SUBCUTANEOUS at 00:51

## 2021-01-01 RX ADMIN — Medication 650 MILLIGRAM(S): at 05:02

## 2021-01-01 RX ADMIN — LIDOCAINE 1 PATCH: 4 CREAM TOPICAL at 00:00

## 2021-01-01 RX ADMIN — LIDOCAINE 1 PATCH: 4 CREAM TOPICAL at 12:24

## 2021-01-01 RX ADMIN — SENNA PLUS 2 TABLET(S): 8.6 TABLET ORAL at 23:00

## 2021-01-01 RX ADMIN — Medication 3 MICROGRAM(S)/MIN: at 23:08

## 2021-01-01 RX ADMIN — Medication 5.54 MICROGRAM(S)/KG/MIN: at 22:15

## 2021-01-01 RX ADMIN — SENNA PLUS 2 TABLET(S): 8.6 TABLET ORAL at 22:15

## 2021-01-01 RX ADMIN — QUETIAPINE FUMARATE 12.5 MILLIGRAM(S): 200 TABLET, FILM COATED ORAL at 14:53

## 2021-01-01 RX ADMIN — QUETIAPINE FUMARATE 12.5 MILLIGRAM(S): 200 TABLET, FILM COATED ORAL at 22:39

## 2021-01-01 RX ADMIN — Medication 650 MILLIGRAM(S): at 09:09

## 2021-01-01 RX ADMIN — CHLORHEXIDINE GLUCONATE 1 APPLICATION(S): 213 SOLUTION TOPICAL at 20:10

## 2021-01-01 RX ADMIN — Medication 102 MILLIGRAM(S): at 09:26

## 2021-01-01 RX ADMIN — Medication 5.54 MICROGRAM(S)/KG/MIN: at 12:47

## 2021-01-01 RX ADMIN — SODIUM CHLORIDE 500 MILLILITER(S): 9 INJECTION INTRAMUSCULAR; INTRAVENOUS; SUBCUTANEOUS at 13:00

## 2021-01-01 RX ADMIN — MILRINONE LACTATE 6.65 MICROGRAM(S)/KG/MIN: 1 INJECTION, SOLUTION INTRAVENOUS at 07:56

## 2021-01-01 RX ADMIN — ATORVASTATIN CALCIUM 80 MILLIGRAM(S): 80 TABLET, FILM COATED ORAL at 21:03

## 2021-01-01 RX ADMIN — Medication 12.5 MILLIGRAM(S): at 17:43

## 2021-01-01 RX ADMIN — Medication 650 MILLIGRAM(S): at 13:00

## 2021-01-01 RX ADMIN — HEPARIN SODIUM 8 UNIT(S)/HR: 5000 INJECTION INTRAVENOUS; SUBCUTANEOUS at 05:52

## 2021-01-01 RX ADMIN — SODIUM CHLORIDE 250 MILLILITER(S): 9 INJECTION INTRAMUSCULAR; INTRAVENOUS; SUBCUTANEOUS at 18:31

## 2021-01-01 RX ADMIN — Medication 40 MILLIGRAM(S): at 05:57

## 2021-01-01 RX ADMIN — Medication 4.43 MICROGRAM(S)/KG/MIN: at 08:09

## 2021-01-01 RX ADMIN — SODIUM CHLORIDE 100 MILLILITER(S): 9 INJECTION INTRAMUSCULAR; INTRAVENOUS; SUBCUTANEOUS at 20:39

## 2021-01-01 RX ADMIN — LIDOCAINE 1 PATCH: 4 CREAM TOPICAL at 20:10

## 2021-01-01 RX ADMIN — CHLORHEXIDINE GLUCONATE 1 APPLICATION(S): 213 SOLUTION TOPICAL at 22:15

## 2021-01-01 RX ADMIN — Medication 8.87 MICROGRAM(S)/KG/MIN: at 17:27

## 2021-01-01 RX ADMIN — CHLORHEXIDINE GLUCONATE 1 APPLICATION(S): 213 SOLUTION TOPICAL at 22:00

## 2021-01-01 RX ADMIN — Medication 50 GRAM(S): at 04:55

## 2021-01-01 RX ADMIN — HEPARIN SODIUM 8 UNIT(S)/HR: 5000 INJECTION INTRAVENOUS; SUBCUTANEOUS at 05:51

## 2021-01-01 RX ADMIN — Medication 650 MILLIGRAM(S): at 18:40

## 2021-01-01 RX ADMIN — Medication 60 MILLIGRAM(S): at 23:15

## 2021-01-01 RX ADMIN — Medication 650 MILLIGRAM(S): at 13:31

## 2021-01-01 RX ADMIN — ONDANSETRON 4 MILLIGRAM(S): 8 TABLET, FILM COATED ORAL at 20:39

## 2021-01-01 RX ADMIN — Medication 650 MILLIGRAM(S): at 23:00

## 2021-01-01 RX ADMIN — Medication 5 MILLIGRAM(S): at 23:00

## 2021-01-01 RX ADMIN — Medication 5 MILLIGRAM(S): at 21:28

## 2021-01-01 RX ADMIN — Medication 25 MILLIGRAM(S): at 05:20

## 2021-01-01 RX ADMIN — Medication 650 MILLIGRAM(S): at 22:16

## 2021-01-01 RX ADMIN — Medication 650 MILLIGRAM(S): at 09:00

## 2021-01-01 RX ADMIN — ONDANSETRON 4 MILLIGRAM(S): 8 TABLET, FILM COATED ORAL at 10:32

## 2021-01-01 RX ADMIN — ATORVASTATIN CALCIUM 80 MILLIGRAM(S): 80 TABLET, FILM COATED ORAL at 22:14

## 2021-01-01 RX ADMIN — LIDOCAINE 1 PATCH: 4 CREAM TOPICAL at 19:00

## 2021-01-01 RX ADMIN — Medication 81 MILLIGRAM(S): at 11:58

## 2021-01-01 RX ADMIN — Medication 400 MILLIGRAM(S): at 14:54

## 2021-01-01 RX ADMIN — LIDOCAINE 1 PATCH: 4 CREAM TOPICAL at 23:09

## 2021-01-01 RX ADMIN — HEPARIN SODIUM 8 UNIT(S)/HR: 5000 INJECTION INTRAVENOUS; SUBCUTANEOUS at 21:44

## 2021-01-01 RX ADMIN — QUETIAPINE FUMARATE 12.5 MILLIGRAM(S): 200 TABLET, FILM COATED ORAL at 23:17

## 2021-01-01 RX ADMIN — CHLORHEXIDINE GLUCONATE 1 APPLICATION(S): 213 SOLUTION TOPICAL at 22:16

## 2021-01-01 RX ADMIN — ATORVASTATIN CALCIUM 80 MILLIGRAM(S): 80 TABLET, FILM COATED ORAL at 21:43

## 2021-01-01 RX ADMIN — ONDANSETRON 4 MILLIGRAM(S): 8 TABLET, FILM COATED ORAL at 11:26

## 2021-01-01 RX ADMIN — Medication 650 MILLIGRAM(S): at 06:26

## 2021-01-01 RX ADMIN — Medication 8.87 MICROGRAM(S)/KG/MIN: at 23:00

## 2021-01-01 RX ADMIN — Medication 650 MILLIGRAM(S): at 21:44

## 2021-01-01 RX ADMIN — Medication 650 MILLIGRAM(S): at 05:50

## 2021-01-01 RX ADMIN — FENTANYL CITRATE 75 MICROGRAM(S): 50 INJECTION INTRAVENOUS at 23:45

## 2021-01-01 RX ADMIN — Medication 1 TABLET(S): at 12:47

## 2021-01-01 RX ADMIN — MORPHINE SULFATE 1 MILLIGRAM(S): 50 CAPSULE, EXTENDED RELEASE ORAL at 10:45

## 2021-01-01 RX ADMIN — Medication 650 MILLIGRAM(S): at 12:23

## 2021-01-01 RX ADMIN — TICAGRELOR 90 MILLIGRAM(S): 90 TABLET ORAL at 05:35

## 2021-01-01 RX ADMIN — Medication 8.87 MICROGRAM(S)/KG/MIN: at 08:17

## 2021-01-01 RX ADMIN — Medication 650 MILLIGRAM(S): at 13:02

## 2021-01-01 RX ADMIN — MORPHINE SULFATE 2 MILLIGRAM(S): 50 CAPSULE, EXTENDED RELEASE ORAL at 18:45

## 2021-01-01 RX ADMIN — Medication 1 TABLET(S): at 11:58

## 2021-01-01 RX ADMIN — MORPHINE SULFATE 2 MILLIGRAM(S): 50 CAPSULE, EXTENDED RELEASE ORAL at 20:36

## 2021-01-01 RX ADMIN — ONDANSETRON 4 MILLIGRAM(S): 8 TABLET, FILM COATED ORAL at 18:25

## 2021-01-01 RX ADMIN — Medication 5.54 MICROGRAM(S)/KG/MIN: at 13:05

## 2021-01-01 RX ADMIN — Medication 11.1 MICROGRAM(S)/KG/MIN: at 20:58

## 2021-01-01 RX ADMIN — LIDOCAINE 1 PATCH: 4 CREAM TOPICAL at 19:05

## 2021-01-01 RX ADMIN — Medication 85 MILLIMOLE(S): at 06:01

## 2021-01-01 RX ADMIN — Medication 650 MILLIGRAM(S): at 06:54

## 2021-01-01 RX ADMIN — Medication 5.54 MICROGRAM(S)/KG/MIN: at 20:10

## 2021-01-01 RX ADMIN — Medication 5 MILLIGRAM(S): at 21:03

## 2021-01-01 RX ADMIN — POLYETHYLENE GLYCOL 3350 17 GRAM(S): 17 POWDER, FOR SOLUTION ORAL at 11:09

## 2021-01-01 RX ADMIN — Medication 8.87 MICROGRAM(S)/KG/MIN: at 12:47

## 2021-01-01 RX ADMIN — Medication 50 GRAM(S): at 05:34

## 2021-01-01 RX ADMIN — ATORVASTATIN CALCIUM 80 MILLIGRAM(S): 80 TABLET, FILM COATED ORAL at 23:00

## 2021-01-01 RX ADMIN — Medication 650 MILLIGRAM(S): at 17:44

## 2021-01-01 RX ADMIN — MORPHINE SULFATE 2 MILLIGRAM(S): 50 CAPSULE, EXTENDED RELEASE ORAL at 18:30

## 2021-01-01 RX ADMIN — SENNA PLUS 2 TABLET(S): 8.6 TABLET ORAL at 21:03

## 2021-01-01 RX ADMIN — Medication 650 MILLIGRAM(S): at 10:00

## 2021-01-01 RX ADMIN — MILRINONE LACTATE 6.65 MICROGRAM(S)/KG/MIN: 1 INJECTION, SOLUTION INTRAVENOUS at 11:21

## 2021-01-01 RX ADMIN — Medication 40 MILLIGRAM(S): at 06:59

## 2021-01-01 RX ADMIN — LIDOCAINE 1 PATCH: 4 CREAM TOPICAL at 12:48

## 2021-01-01 RX ADMIN — SODIUM CHLORIDE 250 MILLILITER(S): 9 INJECTION INTRAMUSCULAR; INTRAVENOUS; SUBCUTANEOUS at 22:15

## 2021-01-01 RX ADMIN — Medication 50 GRAM(S): at 03:58

## 2021-01-01 RX ADMIN — Medication 650 MILLIGRAM(S): at 22:15

## 2021-01-01 RX ADMIN — Medication 650 MILLIGRAM(S): at 12:59

## 2021-01-01 RX ADMIN — Medication 650 MILLIGRAM(S): at 06:03

## 2021-01-01 RX ADMIN — Medication 8.87 MICROGRAM(S)/KG/MIN: at 20:10

## 2021-01-01 RX ADMIN — Medication 50 GRAM(S): at 05:54

## 2021-01-01 RX ADMIN — Medication 81 MILLIGRAM(S): at 12:47

## 2021-01-01 RX ADMIN — Medication 8.87 MICROGRAM(S)/KG/MIN: at 22:15

## 2021-01-01 RX ADMIN — TICAGRELOR 180 MILLIGRAM(S): 90 TABLET ORAL at 22:03

## 2021-01-01 RX ADMIN — LIDOCAINE 1 PATCH: 4 CREAM TOPICAL at 02:00

## 2021-01-01 RX ADMIN — Medication 400 MILLIGRAM(S): at 05:33

## 2021-01-01 RX ADMIN — Medication 40 MILLIGRAM(S): at 11:26

## 2021-01-01 RX ADMIN — VASOPRESSIN 2.4 UNIT(S)/MIN: 20 INJECTION INTRAVENOUS at 10:31

## 2021-01-01 RX ADMIN — MORPHINE SULFATE 1 MILLIGRAM(S): 50 CAPSULE, EXTENDED RELEASE ORAL at 19:14

## 2021-01-01 RX ADMIN — Medication 650 MILLIGRAM(S): at 18:13

## 2021-01-01 RX ADMIN — QUETIAPINE FUMARATE 25 MILLIGRAM(S): 200 TABLET, FILM COATED ORAL at 21:03

## 2021-01-01 RX ADMIN — Medication 650 MILLIGRAM(S): at 13:32

## 2021-01-01 RX ADMIN — Medication 1 TABLET(S): at 11:08

## 2021-01-01 RX ADMIN — Medication 650 MILLIGRAM(S): at 15:27

## 2021-01-01 RX ADMIN — Medication 1 TABLET(S): at 12:22

## 2021-01-01 RX ADMIN — Medication 650 MILLIGRAM(S): at 05:19

## 2021-01-01 RX ADMIN — Medication 5.54 MICROGRAM(S)/KG/MIN: at 23:00

## 2021-01-01 RX ADMIN — Medication 5.54 MICROGRAM(S)/KG/MIN: at 08:17

## 2021-01-01 RX ADMIN — CHLORHEXIDINE GLUCONATE 1 APPLICATION(S): 213 SOLUTION TOPICAL at 21:51

## 2021-01-01 RX ADMIN — Medication 5.54 MICROGRAM(S)/KG/MIN: at 14:01

## 2021-01-01 RX ADMIN — QUETIAPINE FUMARATE 25 MILLIGRAM(S): 200 TABLET, FILM COATED ORAL at 21:44

## 2021-01-01 RX ADMIN — Medication 81 MILLIGRAM(S): at 12:59

## 2021-01-01 RX ADMIN — Medication 3 MILLIGRAM(S): at 21:14

## 2021-01-01 RX ADMIN — MILRINONE LACTATE 6.65 MICROGRAM(S)/KG/MIN: 1 INJECTION, SOLUTION INTRAVENOUS at 21:44

## 2021-01-01 RX ADMIN — CHLORHEXIDINE GLUCONATE 1 APPLICATION(S): 213 SOLUTION TOPICAL at 23:17

## 2021-01-01 RX ADMIN — Medication 400 MILLIGRAM(S): at 03:46

## 2021-01-01 RX ADMIN — Medication 650 MILLIGRAM(S): at 21:13

## 2021-01-01 RX ADMIN — Medication 5 MILLIGRAM(S): at 21:44

## 2021-01-01 RX ADMIN — ATORVASTATIN CALCIUM 80 MILLIGRAM(S): 80 TABLET, FILM COATED ORAL at 21:14

## 2021-01-01 RX ADMIN — Medication 10 MILLIGRAM(S): at 12:23

## 2021-01-01 RX ADMIN — Medication 81 MILLIGRAM(S): at 12:22

## 2021-01-01 RX ADMIN — Medication 1000 MILLIGRAM(S): at 15:00

## 2021-01-01 RX ADMIN — LIDOCAINE 1 PATCH: 4 CREAM TOPICAL at 19:26

## 2021-01-01 RX ADMIN — Medication 100 GRAM(S): at 05:09

## 2021-01-01 RX ADMIN — VASOPRESSIN 2.4 UNIT(S)/MIN: 20 INJECTION INTRAVENOUS at 21:44

## 2021-01-01 RX ADMIN — PROTHROMBIN COMPLEX CONCENTRATE (HUMAN) 400 INTERNATIONAL UNIT(S): 25.5; 16.5; 24; 22; 22; 26 POWDER, FOR SOLUTION INTRAVENOUS at 09:03

## 2021-01-01 RX ADMIN — Medication 1 TABLET(S): at 12:24

## 2021-01-01 RX ADMIN — Medication 650 MILLIGRAM(S): at 21:03

## 2021-01-01 RX ADMIN — ONDANSETRON 4 MILLIGRAM(S): 8 TABLET, FILM COATED ORAL at 06:20

## 2021-01-01 RX ADMIN — Medication 81 MILLIGRAM(S): at 13:57

## 2021-01-01 RX ADMIN — Medication 11.1 MICROGRAM(S)/KG/MIN: at 21:45

## 2021-01-01 RX ADMIN — POLYETHYLENE GLYCOL 3350 17 GRAM(S): 17 POWDER, FOR SOLUTION ORAL at 12:21

## 2021-01-01 RX ADMIN — Medication 1000 MILLIGRAM(S): at 14:15

## 2021-01-01 RX ADMIN — Medication 81 MILLIGRAM(S): at 12:24

## 2021-01-01 RX ADMIN — Medication 1 TABLET(S): at 11:33

## 2021-01-01 RX ADMIN — Medication 5.54 MICROGRAM(S)/KG/MIN: at 19:28

## 2021-01-01 RX ADMIN — Medication 650 MILLIGRAM(S): at 05:32

## 2021-01-01 RX ADMIN — MORPHINE SULFATE 1 MILLIGRAM(S): 50 CAPSULE, EXTENDED RELEASE ORAL at 10:21

## 2021-01-01 RX ADMIN — SODIUM CHLORIDE 1000 MILLILITER(S): 9 INJECTION INTRAMUSCULAR; INTRAVENOUS; SUBCUTANEOUS at 18:00

## 2021-01-01 RX ADMIN — Medication 650 MILLIGRAM(S): at 09:16

## 2021-01-01 RX ADMIN — Medication 650 MILLIGRAM(S): at 09:40

## 2021-01-01 RX ADMIN — Medication 81 MILLIGRAM(S): at 11:08

## 2021-01-01 RX ADMIN — HEPARIN SODIUM 10 UNIT(S)/HR: 5000 INJECTION INTRAVENOUS; SUBCUTANEOUS at 04:00

## 2021-01-01 RX ADMIN — ATORVASTATIN CALCIUM 80 MILLIGRAM(S): 80 TABLET, FILM COATED ORAL at 21:11

## 2021-01-01 RX ADMIN — Medication 81 MILLIGRAM(S): at 12:50

## 2021-01-01 RX ADMIN — Medication 8.87 MICROGRAM(S)/KG/MIN: at 19:28

## 2021-01-01 RX ADMIN — ATORVASTATIN CALCIUM 80 MILLIGRAM(S): 80 TABLET, FILM COATED ORAL at 20:11

## 2021-01-01 RX ADMIN — MILRINONE LACTATE 4.43 MICROGRAM(S)/KG/MIN: 1 INJECTION, SOLUTION INTRAVENOUS at 07:56

## 2021-01-01 RX ADMIN — MILRINONE LACTATE 6.65 MICROGRAM(S)/KG/MIN: 1 INJECTION, SOLUTION INTRAVENOUS at 20:58

## 2021-01-01 RX ADMIN — ATORVASTATIN CALCIUM 80 MILLIGRAM(S): 80 TABLET, FILM COATED ORAL at 21:28

## 2021-01-01 RX ADMIN — LIDOCAINE 1 PATCH: 4 CREAM TOPICAL at 20:05

## 2021-01-01 RX ADMIN — Medication 650 MILLIGRAM(S): at 20:41

## 2021-01-01 RX ADMIN — Medication 650 MILLIGRAM(S): at 13:59

## 2021-01-01 RX ADMIN — Medication 400 MILLIGRAM(S): at 15:37

## 2021-01-01 RX ADMIN — SODIUM CHLORIDE 500 MILLILITER(S): 9 INJECTION INTRAMUSCULAR; INTRAVENOUS; SUBCUTANEOUS at 10:00

## 2021-01-01 RX ADMIN — LIDOCAINE 1 PATCH: 4 CREAM TOPICAL at 00:41

## 2021-01-01 RX ADMIN — Medication 650 MILLIGRAM(S): at 08:12

## 2021-01-01 RX ADMIN — Medication 650 MILLIGRAM(S): at 06:45

## 2021-01-01 RX ADMIN — MORPHINE SULFATE 1 MILLIGRAM(S): 50 CAPSULE, EXTENDED RELEASE ORAL at 13:58

## 2021-01-01 RX ADMIN — Medication 650 MILLIGRAM(S): at 06:14

## 2021-01-01 RX ADMIN — Medication 650 MILLIGRAM(S): at 21:28

## 2021-01-01 RX ADMIN — SODIUM CHLORIDE 1000 MILLILITER(S): 9 INJECTION INTRAMUSCULAR; INTRAVENOUS; SUBCUTANEOUS at 19:00

## 2021-01-01 RX ADMIN — Medication 324 MILLIGRAM(S): at 22:04

## 2021-01-01 RX ADMIN — Medication 30 MILLILITER(S): at 12:21

## 2021-01-01 RX ADMIN — Medication 8.87 MICROGRAM(S)/KG/MIN: at 12:48

## 2021-01-01 RX ADMIN — FENTANYL CITRATE 75 MICROGRAM(S): 50 INJECTION INTRAVENOUS at 23:15

## 2021-01-01 RX ADMIN — SODIUM ZIRCONIUM CYCLOSILICATE 10 GRAM(S): 10 POWDER, FOR SUSPENSION ORAL at 06:21

## 2021-01-01 RX ADMIN — CHLORHEXIDINE GLUCONATE 1 APPLICATION(S): 213 SOLUTION TOPICAL at 13:57

## 2021-01-01 RX ADMIN — Medication 5 MILLIGRAM(S): at 20:11

## 2021-01-01 RX ADMIN — HEPARIN SODIUM 8 UNIT(S)/HR: 5000 INJECTION INTRAVENOUS; SUBCUTANEOUS at 12:38

## 2021-01-01 RX ADMIN — LIDOCAINE 1 PATCH: 4 CREAM TOPICAL at 14:53

## 2021-01-01 RX ADMIN — SODIUM CHLORIDE 500 MILLILITER(S): 9 INJECTION INTRAMUSCULAR; INTRAVENOUS; SUBCUTANEOUS at 15:51

## 2021-01-01 RX ADMIN — Medication 1000 MILLIGRAM(S): at 06:03

## 2021-01-01 RX ADMIN — MORPHINE SULFATE 1 MILLIGRAM(S): 50 CAPSULE, EXTENDED RELEASE ORAL at 18:40

## 2021-01-01 RX ADMIN — Medication 11.1 MICROGRAM(S)/KG/MIN: at 07:56

## 2021-01-01 RX ADMIN — HYDROMORPHONE HYDROCHLORIDE 0.5 MILLIGRAM(S): 2 INJECTION INTRAMUSCULAR; INTRAVENOUS; SUBCUTANEOUS at 00:06

## 2021-01-01 RX ADMIN — Medication 650 MILLIGRAM(S): at 21:14

## 2021-01-01 RX ADMIN — SODIUM CHLORIDE 50 MILLILITER(S): 9 INJECTION INTRAMUSCULAR; INTRAVENOUS; SUBCUTANEOUS at 08:06

## 2021-01-01 RX ADMIN — LIDOCAINE 1 PATCH: 4 CREAM TOPICAL at 11:58

## 2021-01-01 RX ADMIN — Medication 1000 MILLIGRAM(S): at 04:00

## 2021-01-01 RX ADMIN — POLYETHYLENE GLYCOL 3350 17 GRAM(S): 17 POWDER, FOR SOLUTION ORAL at 12:47

## 2021-01-01 RX ADMIN — Medication 650 MILLIGRAM(S): at 05:08

## 2021-01-01 RX ADMIN — POLYETHYLENE GLYCOL 3350 17 GRAM(S): 17 POWDER, FOR SOLUTION ORAL at 12:01

## 2021-01-01 RX ADMIN — Medication 650 MILLIGRAM(S): at 21:10

## 2021-01-01 RX ADMIN — HEPARIN SODIUM 8 UNIT(S)/HR: 5000 INJECTION INTRAVENOUS; SUBCUTANEOUS at 17:03

## 2021-01-01 RX ADMIN — Medication 5 MILLIGRAM(S): at 22:15

## 2021-01-01 RX ADMIN — Medication 2 MILLIGRAM(S): at 00:11

## 2021-01-01 RX ADMIN — Medication 81 MILLIGRAM(S): at 11:33

## 2021-01-01 RX ADMIN — Medication 12.5 MILLIGRAM(S): at 12:26

## 2021-01-01 RX ADMIN — Medication 20 MILLIGRAM(S): at 02:52

## 2021-01-01 RX ADMIN — Medication 5.54 MICROGRAM(S)/KG/MIN: at 07:29

## 2021-07-11 NOTE — ED PROVIDER NOTE - PROGRESS NOTE DETAILS
Maritza PRYOR for Dr. Coyle   87 y/o female with PMHx of Afib on Coumadin, HTN, HLD, Hyperparathyroidism s/p parathyroidectomy, Macular degeneration s/p bilateral cataract extraction, Temporal arteritis s/p bilateral surgical repair, Osteoarthritis, Osteoporosis, Spinal stenosis, Sternal fracture (2010), H/O lumbar laminectomy with microdiskectomy, H/O total left knee replacement, H/O nasal septoplasty, H/O cholecystectomy presents to the ED c/o chest pain. Pt endorses +increased exertion since yesterday, states she is moving from her private home to [facility]. Pt states she started having back pain yesterday, after which she had chest pain and passed out. States the back pain and chest pain continued to today, so she came to ED for evaluation. PCP: Dr. Ruiz.    PE: vital signs normal, afebrile, in no distress. head atraumatic. PERRL, EOMI. Neck supple. heart S1, S2, regular rate and rhythm. Abdomen soft. Lungs clear Extremities atraumatic, FROM intact, no edema. Neuro: A&Ox3, no deficits.  Impression: chest and back with syncope. Plan: Labs, EKG, CT brain and likely admission for further work-up. spoke with cardio Dr Yao, case discussed, advised admit for tele and echo, will see patient in am, informed results pending, plan to obtain results, transfer to [place] for admission Maritza PRYOR for Dr. Coyle   87 y/o female with PMHx of Afib on Coumadin, HTN, HLD, Hyperparathyroidism s/p parathyroidectomy, Macular degeneration s/p bilateral cataract extraction, Temporal arteritis s/p bilateral surgical repair, Osteoarthritis, Osteoporosis, Spinal stenosis, Sternal fracture (2010), H/O lumbar laminectomy with microdiskectomy, H/O total left knee replacement, H/O nasal septoplasty, H/O cholecystectomy presents to the ED c/o chest pain. Pt endorses +increased exertion since yesterday, states she is moving from her private home to [facility]. Pt states she started having back pain yesterday, after which she had chest pain/dizziness and passed out. States the back pain and chest pain continued to today, so she came to ED for evaluation. PCP: Dr. Ruiz.    PE: vital signs normal, afebrile, in no distress. head atraumatic. PERRL, EOMI. Neck supple. heart S1, S2, regular rate and rhythm. Abdomen soft. Lungs clear Extremities atraumatic, FROM intact, no edema. Neuro: A&Ox3, no deficits.    Impression: chest and back with syncope.   Plan: Labs, EKG, CT brain and likely admission for further work-up. spoke cardio Dr Yao, current results discussed, trop >40, coming to see patient patient seen and evaluated by Dr Yao, cardio, advised cta chest r/o dissection, aware of bun/cr results, advised no lovenox, lovenox not given as per RN Jennifer spoke with [facility], Dr Ware cardiology fellow, case discussed results discussed, requested fax ekg , transfer patient to [facility] ED, he will see in ED, speak with ED Doctor  spoke with Dr Adams, ED, case discussed, results discussed, accepted patient  discussed plan of care with patient, agreed to transfer. spoke with Dr Yao, discussed result of cta, advised transfer patient to [place], speak with cardiology Fellow, no further orders at this time. spoke with [facility], Dr Ware cardiology fellow, case discussed results discussed, requested fax ekg to [phone], ekg was faxed , transfer patient to [place] ED, he will see in ED, speak with ED Doctor  spoke with Dr Adams, ED, case discussed, results discussed, accepted patient  discussed plan of care with patient, agreed to transfer.

## 2021-07-11 NOTE — ED PROVIDER NOTE - PHYSICAL EXAMINATION
Const: Well-nourished, Well-developed, appearing stated age.  Eyes: no conjunctival injection, and symmetrical lids.  HEENT: Head NCAT, no lesions. Atraumatic external nose and ears. Moist MM.  Neck: Symmetric, trachea midline.   CVS: +S1/S2, Peripheral pulses 2+ and equal in all extremities.  RESP: +mildly labored respiratory effort. Clear to auscultation bilaterally.  GI: +active emesis. Nontender/Nondistended.   MSK: Normocephalic/Atraumatic, Lower Extremities w out LE edema.   Skin: Warm, dry and intact.   Neuro: CNs II-XII grossly intact. Motor & Sensation grossly intact.  Psych: Awake, Alert, & Oriented (AAO) x3. Appropriate mood and affect.

## 2021-07-11 NOTE — ED ADULT NURSE NOTE - INTERVENTIONS DEFINITIONS
[place] to call system/Call bell, personal items and telephone within reach/Provide visual cue, wrist band, yellow gown, etc./Provide visual clues: red socks

## 2021-07-11 NOTE — ED PROVIDER NOTE - PSH
H/O bilateral cataract extraction    H/O nasal septoplasty    H/O parathyroidectomy    H/O total knee replacement, left    History of cholecystectomy    History of lumbar laminectomy  with microdiskectomy  History of temporal arteritis  b/l surgical repair   gabi sanon,   Phone: (   )    -  Fax: (   )    -  Follow Up Time:

## 2021-07-11 NOTE — ED PROVIDER NOTE - PROGRESS NOTE DETAILS
Pt's GFR <30, 22 at OSH. Signed off for patient to get repeat dose of contrast for aorta imaging. Benefits outweigh the risks. Spoke to radiology tech. Shanice Reece, LETI PGY3 No heparin given pt on coumadin and INR >2 already. Will give aspirin and Brilinta and admit to CCU. LETI Bonner PGY3

## 2021-07-11 NOTE — CONSULT NOTE ADULT - ASSESSMENT
The patient is an 86 year old female with a history of HTN, HL, atrial fibrillation who presents with chest pain, back pain, and syncope.    Plan:  - No recent ECG on file; new RBBB and lateral ST abnormalities compared to ECG from 2015  - Troponin noted to be >40 in the setting of NSTEMI. Continue to trend.  - Bedside echo with moderately reduced LV systolic function in LAD territory. At least moderate MR. No pericardial effusion. Cannot exclude proximal dissection.  - Despite CRISSY noted on labs, will check CTA chest to r/o dissection given ongoing back pain  - If no dissection present, start heparin drip and give aspirin 325, clopidogrel 300 mg  - No evidence of decompensated heart failure on exam; can give gentle hydration if needed  - Plan for transfer to [facility], service pending CTA chest    35 minutes of critical care time spent with the patient and coordinating care with nursing and emergency room physician. Patient is critically ill. The patient is high risk for deterioration and death.

## 2021-07-11 NOTE — ED PROVIDER NOTE - CARE PLAN
Principal Discharge DX:	Syncope, unspecified syncope type  Secondary Diagnosis:	Chest pain, unspecified type  Secondary Diagnosis:	Atrial fibrillation, unspecified type   Principal Discharge DX:	Syncope, unspecified syncope type  Secondary Diagnosis:	Chest pain, unspecified type  Secondary Diagnosis:	Atrial fibrillation, unspecified type  Secondary Diagnosis:	Back pain

## 2021-07-11 NOTE — ED ADULT NURSE NOTE - OBJECTIVE STATEMENT
Patient reports chest pressure and back pain. States she is in the process of moving and has been doing lifting and packing clothes. Pain and discomfort started yesterday but felt better after she slept. Patient reports chest pressure and back pain. States she is in the process of moving and has been doing lifting and packing clothes. Pain and discomfort started yesterday but felt better after she slept. Reports that she felt dizzy last night and might have passed out. She leaned on the wall and slid to the floor and might have lost consciousness then. When she "woke up" she went to her bed and slept. Got up today and continues packing, chest pain and back pain returned.

## 2021-07-11 NOTE — ED ADULT NURSE NOTE - PUPILS PERRL
Pt yelling out, wants to go. Talked with him about waiting for results to come back, his son is at bedside and I talked with him and asked if this is normal behavior for him and he states no, that this is very out of the normal for him.    yes

## 2021-07-11 NOTE — ED PROVIDER NOTE - CRITICAL CARE ATTENDING CONTRIBUTION TO CARE
Critical Care time: I personally spent 60 mins assessing presenting problems of acute illness that poses high probability of life threatening deterioration or end organ damage/dysfunction.  Medical decision making including Initiating plan of care, reviewing data, reviewing radiology, discussing with multidisciplinary team, non inclusive of procedures, discussing goals of care with patient/family

## 2021-07-11 NOTE — ED PROVIDER NOTE - NS ED ROS FT
CONST: no fevers, no chills, no trauma  EYES: no pain, no blurry vision   ENT: no sore throat, no epistaxis, no rhinorrhea  CV: + chest pain, no palpitations, no orthopnea, no extremity pain or swelling  RESP: no shortness of breath, no cough, no sputum, no pleurisy, no wheezing  ABD: no abdominal pain, no nausea, no vomiting, no diarrhea, no black or bloody stool  : no dysuria, no hematuria, no frequency, no urgency  MSK: no back pain, no neck pain, no extremity pain  NEURO: no headache, no sensory disturbances, no focal weakness, no dizziness  HEME: no easy bleeding or bruising  SKIN: no diaphoresis, no rash CONST: no fevers, no chills, no trauma  EYES: no pain, no blurry vision   ENT: no sore throat, no epistaxis, no rhinorrhea  CV: + chest pain, no palpitations, no orthopnea, no extremity pain or swelling  RESP: no shortness of breath, no cough, no sputum, no pleurisy, no wheezing  ABD: no abdominal pain, + nausea, + vomiting, no diarrhea, no black or bloody stool  : no dysuria, no hematuria, no frequency, no urgency  MSK: no back pain, no neck pain, no extremity pain  NEURO: no headache, no sensory disturbances, no focal weakness, no dizziness  HEME: no easy bleeding or bruising  SKIN: no diaphoresis, no rash

## 2021-07-11 NOTE — ED PROVIDER NOTE - EXTREMITY EXAM
Morphology Per Location (Optional): Regular brown papule Size Of Lesion: 7mm Detail Level: Detailed Size Of Lesion: 2mm Morphology Per Location (Optional): Dark brown macule Size Of Lesion: 6mm Morphology Per Location (Optional): Pink and brown papule Size Of Lesion: 3mm Morphology Per Location (Optional): Brown macule Morphology Per Location (Optional): Pink and brown papule on macular base Detail Level: Simple Size Of Lesion: 8mm Size Of Lesion: 1mm Morphology Per Location (Optional): Brown papule on macular base Morphology Per Location (Optional): Brown papule no deformity, pain or tenderness, no restriction of movement

## 2021-07-11 NOTE — ED PROVIDER NOTE - CLINICAL SUMMARY MEDICAL DECISION MAKING FREE TEXT BOX
chest pain, back pain, syncope episode, under stress and exertion related to move from present home to assisted living facility, patient on coumadin , hx of afib, will obtain labs, ct, cta, ekg, ce's, transfer to [place], cardio consult

## 2021-07-11 NOTE — ED ADULT NURSE NOTE - OBJECTIVE STATEMENT
86y female transfer from [facility] for r/o dissection, pt with chest pain/back pain starting 2 days prior, seen in ED this afternoon, pt is AAOx3, PMH HTN, Afib on Coumadin, denies headache, shortness of breath, abd pain, reports some n/v, no urinary symptoms, normally amb ind at home, 2 20g IV left forearm from , 18g placed left ac, placed on CM, EKG done, bed in lowest position, comfort and safety provided.

## 2021-07-11 NOTE — CHART NOTE - NSCHARTNOTEFT_GEN_A_CORE
CICU fellow note    85 yo lady PMHx of HTN and paroxysmal Afib on coumadin who was transferred to [facility] ED from [facility] where she presented with 2 week hx of intermittent atypical chest pain, with acute exacerbation of chest pain radiating to back for the last 2 days, also associated with single episode of syncope at home today.     In the OSH ED, vitals - HR 80-90s Afib, -130s, RR 20, and SpO2 98% on 2L NC. Unremarkable exam reported. Labs c/w CRISSY on CKD (Cr 1.9 compared to 1.4 in 2019), with trop >40, pBNP 8543, and CKMB 94.5. CT chest angio (90cc contrast) CICU fellow note    85 yo lady PMHx of HTN and paroxysmal Afib on coumadin who was transferred to [facility] ED from [place] ED where she presented with 2 week hx of intermittent atypical chest pain, with acute exacerbation of chest pain radiating to back for the last 2 days, also associated with single episode of syncope at home today.     In the OS ED, vitals - HR 80-90s Afib, -130s, RR 20, and SpO2 98% on 2L NC. Unremarkable exam reported. Labs c/w CRISSY on CKD (Cr 1.9 compared to 1.4 in 2019), with trop >40, pBNP 8543, and CKMB 94.5. CT chest angio (90cc contrast) without evidence of PE but inconclusive study on aortic dissection. Was transferred to [facility] ED for further management.    At the [facility] ED, vitals - HR 90-100s Afib, /60 at the bedside, RR 20, and SpO2 95% on room air. On physical exam, with JVD ~8cm, without pertinent cardiac or respiratory exam findings, without associated LE edema. Labs c/w CRISSY, hsTrop >10,000, lactate 2.5. EKG with Afib with RBBB, with Q waves on lateral leads. Bedside TTE with reduced EF ~15-20%. Repeat CT chest angio to r/o aortic dissection was negative for significant dissection.    Plan  #NSTEMI likely late presentation MI  - Was given ASA 324mg and brilinta 180mg in the ED  - Holding heparin gtt for supratherapeutic INR, will start heparin once INR <2  - Will need formal full TTE  - Will need diagnostic LHC at am, with minimal contrast, and likely will need viability study thereafter, continue to trend cardiac biomarkers until they peak  - Will start nitro gtt if she develops active chest pain  - Will hold off on starting standing diuretics    #Paroxysmal Afib  - Start heparin once INR <2    Signed by  Zheng Ware MD  PGY5 Cardiology

## 2021-07-11 NOTE — ED PROVIDER NOTE - OBJECTIVE STATEMENT
86F transferred from OSH for ACS, cards eval. Initially presenting to the ER with chest and back pain, episode of dizziness today, states last night had same symptoms, also had syncopal episode. Went to bed, but started feeling worse again while exerting herself. States she drank 2 boosts, and had 1/2 of a peanut butter and jelly sandwich her neighbor made for her, became nauseous. She has hx of afib, she takes coumadin.  No cardiologist. At OSH, CT inconclusive for aortic injury, EKG no stemi but significantly elevated trops.     PMD Dr Ruiz, former smoker, denies etoh.

## 2021-07-11 NOTE — ED ADULT NURSE NOTE - NSIMPLEMENTINTERV_GEN_ALL_ED
Implemented All Fall with Harm Risk Interventions:  [place] to call system. Call bell, personal items and telephone within reach. Instruct patient to call for assistance. Room bathroom lighting operational. Non-slip footwear when patient is off stretcher. Physically safe environment: no spills, clutter or unnecessary equipment. Stretcher in lowest position, wheels locked, appropriate side rails in place. Provide visual cue, wrist band, yellow gown, etc. Monitor gait and stability. Monitor for mental status changes and reorient to person, place, and time. Review medications for side effects contributing to fall risk. Reinforce activity limits and safety measures with patient and family. Provide visual clues: red socks.

## 2021-07-11 NOTE — ED PROVIDER NOTE - OBJECTIVE STATEMENT
86  y female presents with chest and back pain, episode of dizziness today, states last night  had same symptoms, also had syncopal episode, denies head injury, states she got up after syncopal episode and went to bed, today she began moving boxes and continued packing , states she is moving in a few days from her home to an assisted living facility,  states she drank 2 boosts, and had 1/2 of a peanut butter and jelly sandwich her neighbor made for her.  states she has not been eating much, "sometimes I forget to eat",  states she has hx of afib, she takes coumadin.  No cardio, PMD Dr Ruiz, former smoker, denies etoh.    PMH:  Afib    Dyslipidemia    Hyperparathyroidism    Hypertension    Macular degeneration    Osteoarthritis    Osteoporosis    Spinal stenosis    Sternal fracture  2010  Temporal arteritis

## 2021-07-11 NOTE — ED PROVIDER NOTE - CLINICAL SUMMARY MEDICAL DECISION MAKING FREE TEXT BOX
86F presenting from OSH for ACS eval by cards. +CP, n/v. Found to have severely elevated trop. PE: VSS, actively vomiting. Clear lungs, soft abd. Ddx: Cannot rule out dissection given symptoms endorsed by pt. ACS also likely. Plan: Labs, CT, cards eval, admit. Shanice Reece, EM PGY3 86F presenting from OSH for ACS eval by cards. +CP, n/v. Found to have severely elevated trop. PE: VSS, actively vomiting. Clear lungs, soft abd. Ddx: Cannot rule out dissection given symptoms endorsed by pt. ACS also likely. Plan: Labs, CT, cards eval, admit. Shanice Reece, EM PGY3..    CLAIRE Cannon, Attending: reviewed resident note. 86 yoF, xfer from [facility] for concern for NSTEMI. Cardiology aware of xfer and present upon initial presentation. Given elevated dimer, trop, and back/chest pain at [facility] dissection study was obtained which was non diagnostic. Upon arrival pt states pain in back, shoulders, chest, and by waist. Given dissection not entirely ruled out will obtain stat CTA. Cards agrees with this plan. If no aortic pathology will initate AC and DAPT and appreciate cards recs. Vitals stable in ER. No signs of shock state. Non lateralizing neuro exam. Critical care given acute myocardial infarction and high chance of decompensation requiring CCU admission.

## 2021-07-11 NOTE — CONSULT NOTE ADULT - SUBJECTIVE AND OBJECTIVE BOX
History of Present Illness: The patient is an 86 year old female with a history of HTN, HL, atrial fibrillation who presents with chest pain and syncope. She states yesterday she developed substernal chest pain described as an "elephant sitting on her chest." It radiated to the back. The chest pain resolved but the back pain is persistent and severe. She notes associated shortness of breath. She was feeling dizzy and notes that she found herself on the ground after. No recent cardiac testing.    Past Medical/Surgical History:  HTN, HL, atrial fibrillation    Medications:  Home Medications:  Coumadin 5 mg oral tablet: 1 tab(s) orally once a day (11 Jul 2021 17:16)  lisinopril 20 mg oral tablet: 1 tab(s) orally once a day (11 Jul 2021 17:16)  meclizine 12.5 mg oral tablet: 1 tab(s) orally once a day (11 Jul 2021 17:16)  metoprolol succinate 25 mg oral tablet, extended release: 1 tab(s) orally once a day (11 Jul 2021 17:16)  simvastatin 40 mg oral tablet: 1 tab(s) orally once a day (at bedtime) (11 Jul 2021 17:16)      Family History: Non-contributory family history of premature cardiovascular atherosclerotic disease    Social History: No tobacco, alcohol or drug use    Review of Systems:  General: No fevers, chills, weight loss or gain  Skin: No rashes, color changes  Cardiovascular: No chest pain, orthopnea  Respiratory: No shortness of breath, cough  Gastrointestinal: No nausea, abdominal pain  Genitourinary: No incontinence, pain with urination  Musculoskeletal: No pain, swelling, decreased range of motion  Neurological: No headache, weakness  Psychiatric: No depression, anxiety  Endocrine: No weight loss or gain, increased thirst  All other systems are comprehensively negative.    Physical Exam:  Vitals:        Vital Signs Last 24 Hrs  T(C): 36.8 (11 Jul 2021 17:12), Max: 36.8 (11 Jul 2021 17:12)  T(F): 98.2 (11 Jul 2021 17:12), Max: 98.2 (11 Jul 2021 17:12)  HR: 87 (11 Jul 2021 18:30) (86 - 87)  BP: 126/75 (11 Jul 2021 18:30) (106/80 - 126/75)  BP(mean): --  RR: 22 (11 Jul 2021 18:30) (22 - 22)  SpO2: 99% (11 Jul 2021 18:30) (99% - 99%)  General: NAD  HEENT: MMM  Neck: No JVD, no carotid bruit  Lungs: CTAB  CV: RRR, nl S1/S2, no M/R/G  Abdomen: S/NT/ND, +BS  Extremities: No LE edema, no cyanosis  Neuro: AAOx3, non-focal  Skin: No rash    Labs:                        12.5   9.82  )-----------( 196      ( 11 Jul 2021 17:35 )             36.8     07-11    139  |  104  |  37<H>  ----------------------------<  226<H>  4.9   |  23  |  1.99<H>    Ca    10.7<H>      11 Jul 2021 17:35    TPro  6.7  /  Alb  3.7  /  TBili  2.6<H>  /  DBili  x   /  AST  199<H>  /  ALT  44  /  AlkPhos  67  07-11    CARDIAC MARKERS ( 11 Jul 2021 17:35 )  >40.000 ng/mL / x     / x     / x     / x              ECG: AF, RBBB, lateral TWI

## 2021-07-12 NOTE — PROGRESS NOTE ADULT - ASSESSMENT
86 female PMHx of HTN and paroxysmal Afib on coumadin transferred from OSH for late presentation MI with continued CP requiring nitro gtt, s/p LHC revealing clear cors.     NEURO  - no active issue   - AOX4     #PULM  - no active issue   - SPO2>95 % on RA     #CARDIOLOGY  Late presentation MI   - LHC today revealing clear cors  - presentation possibly 2/2 tako vs severe AS  - f/u official echo read, consult structural pending read for aortic valve   - cont ASA statin and BB   - maintain off nitro gtt   - trend CE     Afib  - start heparin gtt once INR <2    HTN   - re-introduce home antihypertensive when able     HLD   - c/w lipitor     #GI  - DASH/TLC diet  - PPI for GERD     #RENAL/  CRISSY likely due to contrast load   - creat 1.8 unknown baseline   - cont to monitor BUN/Creat post cath   - monitor urine output   - nephro consult     #ENDOCRINE   - no active issue   - f/u AIC   - cont to monitor glucose serum    #ID   - no active issues   - remains afebrile and no leukocytosis     #HEMATOLOGY   - no active issue   - H/H stable   - cont to trend CBC     #ID   - no active issue

## 2021-07-12 NOTE — H&P ADULT - HISTORY OF PRESENT ILLNESS
87 yo lady PMHx of HTN and paroxysmal Afib on coumadin who was transferred to [facility] ED from [place] [facility] where she presented with 2 week hx of intermittent atypical chest pain, with acute exacerbation of chest pain radiating to back for the last 2 days, also associated with single episode of syncope at home today.     In the OSH ED, vitals - HR 80-90s Afib, -130s, RR 20, and SpO2 98% on 2L NC. Unremarkable exam reported. Labs c/w CRISSY on CKD (Cr 1.9 compared to 1.4 in 2019), with trop >40, pBNP 8543, and CKMB 94.5. CT chest angio (90cc contrast) without evidence of PE but inconclusive study on aortic dissection. Was transferred to [facility] ED for further management.    At the [facility] ED, vitals - HR 90-100s Afib, /60 at the bedside, RR 20, and SpO2 95% on room air. On physical exam, with JVD ~8cm, without pertinent cardiac or respiratory exam findings, without associated LE edema. Labs c/w CRISSY, hsTrop >10,000, lactate 2.5. EKG with Afib with RBBB, with Q waves on lateral leads. Bedside TTE with reduced EF ~15-20%. Repeat CT chest angio to r/o aortic dissection was negative for significant dissection.     85 yo lady PMHx of HTN and paroxysmal Afib on coumadin who was transferred to [facility] ED from [place] ED where she presented with 2 week hx of intermittent atypical chest pain, with acute exacerbation of chest pain radiating to back for the last 2 days, also associated with single episode of syncope at home today.     In the OSH ED, vitals - HR 80-90s Afib, -130s, RR 20, and SpO2 98% on 2L NC. Unremarkable exam reported. Labs c/w CRISSY on CKD (Cr 1.9 compared to 1.4 in 2019), with trop >40, pBNP 8543, and CKMB 94.5. CT chest angio (90cc contrast) without evidence of PE but inconclusive study on aortic dissection. Was transferred to [facility] ED for further management.    At the [facility] ED, vitals - HR 90-100s Afib, /60 at the bedside, RR 20, and SpO2 95% on room air. On physical exam, with JVD ~8cm, without pertinent cardiac or respiratory exam findings, without associated LE edema. Labs c/w CRISSY, hsTrop >10,000, lactate 2.5. EKG with Afib with RBBB, with Q waves on lateral leads. Bedside TTE with reduced EF ~15-20%. Repeat CT chest angio to r/o aortic dissection was negative for significant dissection.    Admitted to CICU for close monitoring, chest pain management on nitro gtt, pending LHC in AM

## 2021-07-12 NOTE — H&P ADULT - NSICDXFAMILYHX_GEN_ALL_CORE_FT
FAMILY HISTORY:  Child  Still living? No  Family history of brain cancer, Age at diagnosis: Age Unknown     W Plasty Text: The lesion was extirpated to the level of the fat with a #15 scalpel blade.  Given the location of the defect, shape of the defect and the proximity to free margins a W-plasty was deemed most appropriate for repair.  Using a sterile surgical marker, the appropriate transposition arms of the W-plasty were drawn incorporating the defect and placing the expected incisions within the relaxed skin tension lines where possible.    The area thus outlined was incised deep to adipose tissue with a #15 scalpel blade.  The skin margins were undermined to an appropriate distance in all directions utilizing iris scissors.  The opposing transposition arms were then transposed into place in opposite direction and anchored with interrupted buried subcutaneous sutures.

## 2021-07-12 NOTE — H&P ADULT - NSICDXPASTMEDICALHX_GEN_ALL_CORE_FT
PAST MEDICAL HISTORY:  Afib     Dyslipidemia     Hyperparathyroidism     Hypertension     Macular degeneration     Osteoarthritis     Osteoporosis     Spinal stenosis     Sternal fracture 2010    Temporal arteritis

## 2021-07-12 NOTE — PROGRESS NOTE ADULT - ATTENDING COMMENTS
Patient seen and examined. Agree with assessment and plan as outlined above.  86 year-old woman with a history of hypertension and atrial fibrillation on coumadin presents with 2 weeks of intermittent chest discomfort with sustained chest discomfort for the past two days. RBBB with inferior ST-elevations on EKG. Patient with unrelenting chest discomfort despite 50 mcg tridol infusion and po metoprolol. Appreciate renal evaluation. Creatinine of 1.9 after IV contrast for CT chest/abdomen/pelvis. Severe LV systolic dysfunction with severe AS on echocardiogram. Patient discussed with interventionalist. Patient at high-risk for BLANCA but will proceed with LHC for sustained chest discomfort. Hold anti-coagulation for atrial fibrillation. Continue CICU care.

## 2021-07-12 NOTE — PROGRESS NOTE ADULT - ASSESSMENT
86F Hx HTN, pAFib (Coumadin) transferred from OSH inferolateral wall STEMI c/b CRISSY likely in setting of BLANCA    #Late presentation inferolateral STEMI  - s/p ASA/Brilinta load, c/w DAPT  - Increase Lipitor to 80mg, trend LFTs  - Start low dose Metoprolol 12.5 BID  - Remains on Nitro, attempt to wean   - Strict bedrest given persistent CP   - Ischemic eval pending renal   - CE trending down  - f/u echo read     #CRISSY likely in setting of BLANCA  - SCr baseline appears ~1.4 (2019)  - Avoid nephrotoxins, renally dose meds  - Renal consult     Racquel Dubon [facility]  CICU [phone]

## 2021-07-12 NOTE — H&P ADULT - NSHPREVIEWOFSYSTEMS_GEN_ALL_CORE
CONSTITUTIONAL: No weakness, fevers or chills  EYES/ENT: No visual changes;  No vertigo or throat pain   NECK: No pain or stiffness  RESPIRATORY: No cough, wheezing, hemoptysis; No shortness of breath  CARDIOVASCULAR: c/o CP 7/10   GASTROINTESTINAL: No abdominal or epigastric pain. No nausea, vomiting, or hematemesis; No diarrhea or constipation. No melena or hematochezia.  GENITOURINARY: No dysuria, frequency or hematuria  NEUROLOGICAL: No numbness or weakness  SKIN: No itching, rashes

## 2021-07-12 NOTE — PROGRESS NOTE ADULT - SUBJECTIVE AND OBJECTIVE BOX
ANDRE DE LUNA  MRN-[id number]  Patient is a 86y old  Female who presents with a chief complaint of late presentation MI (12 Jul 2021 00:11)    HPI:87 yo lady PMHx of HTN and paroxysmal Afib on coumadin who was transferred to [facility] ED from [place] ED where she presented with 2 week hx of intermittent atypical chest pain, with acute exacerbation of chest pain radiating to back for the last 2 days, also associated with single episode of syncope at home today.     In the OSH ED, vitals - HR 80-90s Afib, -130s, RR 20, and SpO2 98% on 2L NC. Unremarkable exam reported. Labs c/w CRISSY on CKD (Cr 1.9 compared to 1.4 in 2019), with trop >40, pBNP 8543, and CKMB 94.5. CT chest angio (90cc contrast) without evidence of PE but inconclusive study on aortic dissection. Was transferred to [facility] ED for further management.    At the [facility] ED, vitals - HR 90-100s Afib, /60 at the bedside, RR 20, and SpO2 95% on room air. On physical exam, with JVD ~8cm, without pertinent cardiac or respiratory exam findings, without associated LE edema. Labs c/w CRISSY, hsTrop >10,000, lactate 2.5. EKG with Afib with RBBB, with Q waves on lateral leads. Bedside TTE with reduced EF ~15-20%. Repeat CT chest angio to r/o aortic dissection was negative for significant dissection.  Admitted to CICU for close monitoring, chest pain management on nitro gtt, pending [facility] in AM     REVIEW OF SYSTEMS:  CONSTITUTIONAL: No weakness, fevers or chills  EYES/ENT: No visual changes;  No vertigo or throat pain   NECK: No pain or stiffness  RESPIRATORY: No cough, wheezing, hemoptysis; No shortness of breath  CARDIOVASCULAR: No chest pain or palpitations  GASTROINTESTINAL: No abdominal or epigastric pain. No nausea, vomiting, or hematemesis; No diarrhea or constipation. No melena or hematochezia.  GENITOURINARY: No dysuria, frequency or hematuria  NEUROLOGICAL: No numbness or weakness  SKIN: No itching, rashes    ICU Vital Signs Last 24 Hrs  T(C): 36.5 (12 Jul 2021 06:00), Max: 36.8 (11 Jul 2021 17:12)  T(F): 97.7 (12 Jul 2021 06:00), Max: 98.2 (11 Jul 2021 17:12)  HR: 88 (12 Jul 2021 06:30) (82 - 113)  BP: 125/80 (12 Jul 2021 01:30) (106/80 - 135/95)  BP(mean): 106 (12 Jul 2021 01:30) (90 - 119)  ABP: 124/74 (12 Jul 2021 06:30) (122/74 - 140/90)  ABP(mean): 94 (12 Jul 2021 06:30) (94 - 116)  RR: 22 (12 Jul 2021 06:30) (15 - 27)  SpO2: 100% (12 Jul 2021 06:30) (95% - 100%)      I&O's Summary    11 Jul 2021 07:01  -  12 Jul 2021 07:00  --------------------------------------------------------  IN: 268 mL / OUT: 200 mL / NET: 68 mL      PHYSICAL EXAM:    ============================I/O===========================   I&O's Detail    11 Jul 2021 07:01  -  12 Jul 2021 07:00  --------------------------------------------------------  IN:    IV PiggyBack: 50 mL    Nitroglycerin: 18 mL    Oral Fluid: 200 mL  Total IN: 268 mL    OUT:    Voided (mL): 200 mL  Total OUT: 200 mL    Total NET: 68 mL  ============================ LABS =========================                      12.5   10.95 )-----------( 190      ( 12 Jul 2021 04:27 )             38.9     07-12    136  |  102  |  39<H>  ----------------------------<  169<H>  5.5<H>   |  16<L>  |  1.89<H>    Ca    10.9<H>      12 Jul 2021 04:27  Phos  3.3     07-12  Mg     1.7     07-12    TPro  6.5  /  Alb  4.0  /  TBili  1.8<H>  /  DBili  x   /  AST  231<H>  /  ALT  49<H>  /  AlkPhos  73  07-12    Troponin T, High Sensitivity Result: 9486 ng/L (07-12-21 @ 04:27)  Troponin T, High Sensitivity Result: >22205 ng/L (07-11-21 @ 21:34)    CKMB Units: 93.1 ng/mL (07-12-21 @ 04:27)  CKMB Units: 98.0 ng/mL (07-11-21 @ 21:34)  CKMB Units: 94.5 ng/mL (07-11-21 @ 19:19)    Creatine Kinase, Serum: 926 U/L (07-12-21 @ 04:27)  Creatine Kinase, Serum: 1031 U/L (07-11-21 @ 21:34)  Creatine Kinase, Serum: 885 U/L (07-11-21 @ 19:19)    CPK Mass Assay %: 10.1 % (07-12-21 @ 04:27)  CPK Mass Assay %: 9.5 % (07-11-21 @ 21:34)  CPK Mass Assay %: 10.7 % (07-11-21 @ 19:19)    LIVER FUNCTIONS - ( 12 Jul 2021 04:27 )  Alb: 4.0 g/dL / Pro: 6.5 g/dL / ALK PHOS: 73 U/L / ALT: 49 U/L / AST: 231 U/L / GGT: x           PT/INR - ( 12 Jul 2021 04:27 )   PT: 27.8 sec;   INR: 2.42 ratio     PTT - ( 12 Jul 2021 04:27 )  PTT:39.2 sec    Lactate, Blood: 1.9 mmol/L (07-12-21 @ 04:27)  Blood Gas Venous - Lactate: 2.5 mmoL/L (07-11-21 @ 21:34)  ======================Micro/Rad/Cardio=================  Telemetry: Reviewed   EKG: Reviewed  CXR: Reviewed  Culture: Reviewed   Echo:   Cath:   ======================================================  PAST MEDICAL & SURGICAL HISTORY:  Afib    Hypertension    Hyperparathyroidism    Osteoporosis    Dyslipidemia    Macular degeneration    Temporal arteritis    Spinal stenosis    Osteoarthritis    Sternal fracture  2010    History of cholecystectomy    H/O nasal septoplasty    History of lumbar laminectomy  with microdiskectomy    H/O parathyroidectomy    H/O total knee replacement, left    History of temporal arteritis  b/l surgical repair    H/O bilateral cataract extraction   ANDRE DE LUNA  MRN-[id number]  Patient is a 86y old  Female who presents with a chief complaint of late presentation MI (12 Jul 2021 00:11)    HPI: 86F Hx HTN and paroxysmal Afib on coumadin who was transferred to [facility] ED from [place] ED where she presented with 2 week hx of intermittent atypical chest pain, with acute exacerbation of chest pain radiating to back for the last 2 days, also associated with single episode of syncope at home today. In the OS ED, vitals - HR 80-90s Afib, -130s, RR 20, and SpO2 98% on 2L NC. Unremarkable exam reported. Labs c/w CRISSY on CKD (Cr 1.9 compared to 1.4 in 2019), with trop >40, pBNP 8543, and CKMB 94.5. CT chest angio (90cc contrast) without evidence of PE but inconclusive study on aortic dissection. Was transferred to [facility] ED for further management. At the [facility] ED, vitals - HR 90-100s Afib, /60 at the bedside, RR 20, and SpO2 95% on room air. On physical exam, with JVD ~8cm, without pertinent cardiac or respiratory exam findings, without associated LE edema. Labs c/w CRISSY, hsTrop >10,000, lactate 2.5. EKG with Afib with RBBB, with Q waves on lateral leads. Bedside TTE with reduced EF ~15-20%. Repeat CT chest angio to r/o aortic dissection was negative for significant dissection.  Admitted to CICU for close monitoring, chest pain management on nitro gtt, pending C in AM     REVIEW OF SYSTEMS:  CONSTITUTIONAL: No weakness, fevers or chills  EYES/ENT: No visual changes;  No vertigo or throat pain   NECK: No pain or stiffness  RESPIRATORY: No cough, wheezing, hemoptysis; No shortness of breath  CARDIOVASCULAR: + chest pain, no palpitations  GASTROINTESTINAL: No abdominal or epigastric pain. + nausea/vomiting, no hematemesis; No diarrhea or constipation. No melena or hematochezia.  GENITOURINARY: No dysuria, frequency or hematuria  NEUROLOGICAL: No numbness or weakness  SKIN: No itching, rashes    ICU Vital Signs Last 24 Hrs  T(C): 36.5 (12 Jul 2021 06:00), Max: 36.8 (11 Jul 2021 17:12)  T(F): 97.7 (12 Jul 2021 06:00), Max: 98.2 (11 Jul 2021 17:12)  HR: 88 (12 Jul 2021 06:30) (82 - 113)  BP: 125/80 (12 Jul 2021 01:30) (106/80 - 135/95)  BP(mean): 106 (12 Jul 2021 01:30) (90 - 119)  ABP: 124/74 (12 Jul 2021 06:30) (122/74 - 140/90)  ABP(mean): 94 (12 Jul 2021 06:30) (94 - 116)  RR: 22 (12 Jul 2021 06:30) (15 - 27)  SpO2: 100% (12 Jul 2021 06:30) (95% - 100%)      I&O's Summary    11 Jul 2021 07:01  -  12 Jul 2021 07:00  --------------------------------------------------------  IN: 268 mL / OUT: 200 mL / NET: 68 mL      PHYSICAL EXAM:  General: WN/WD NAD, mild CP  Neurology: A&Ox3, nonfocal, MARQUEZ x 4  Respiratory: CTA B/L, no wheezing   CV: Irreg/irregular, S1S2, + systolic murmur  Abdominal: Soft, NT, ND normoactive BS  Extremities: No edema, cool, + distal pulses   Lines: L radial a-line (7/12)  ============================I/O===========================   I&O's Detail    11 Jul 2021 07:01  -  12 Jul 2021 07:00  --------------------------------------------------------  IN:    IV PiggyBack: 50 mL    Nitroglycerin: 18 mL    Oral Fluid: 200 mL  Total IN: 268 mL    OUT:    Voided (mL): 200 mL  Total OUT: 200 mL    Total NET: 68 mL  ============================ LABS =========================                      12.5   10.95 )-----------( 190      ( 12 Jul 2021 04:27 )             38.9     07-12    136  |  102  |  39<H>  ----------------------------<  169<H>  5.5<H>   |  16<L>  |  1.89<H>    Ca    10.9<H>      12 Jul 2021 04:27  Phos  3.3     07-12  Mg     1.7     07-12    TPro  6.5  /  Alb  4.0  /  TBili  1.8<H>  /  DBili  x   /  AST  231<H>  /  ALT  49<H>  /  AlkPhos  73  07-12    Troponin T, High Sensitivity Result: 9486 ng/L (07-12-21 @ 04:27)  Troponin T, High Sensitivity Result: >27226 ng/L (07-11-21 @ 21:34)    CKMB Units: 93.1 ng/mL (07-12-21 @ 04:27)  CKMB Units: 98.0 ng/mL (07-11-21 @ 21:34)  CKMB Units: 94.5 ng/mL (07-11-21 @ 19:19)    Creatine Kinase, Serum: 926 U/L (07-12-21 @ 04:27)  Creatine Kinase, Serum: 1031 U/L (07-11-21 @ 21:34)  Creatine Kinase, Serum: 885 U/L (07-11-21 @ 19:19)    CPK Mass Assay %: 10.1 % (07-12-21 @ 04:27)  CPK Mass Assay %: 9.5 % (07-11-21 @ 21:34)  CPK Mass Assay %: 10.7 % (07-11-21 @ 19:19)    LIVER FUNCTIONS - ( 12 Jul 2021 04:27 )  Alb: 4.0 g/dL / Pro: 6.5 g/dL / ALK PHOS: 73 U/L / ALT: 49 U/L / AST: 231 U/L / GGT: x           PT/INR - ( 12 Jul 2021 04:27 )   PT: 27.8 sec;   INR: 2.42 ratio     PTT - ( 12 Jul 2021 04:27 )  PTT:39.2 sec    Lactate, Blood: 1.9 mmol/L (07-12-21 @ 04:27)  Blood Gas Venous - Lactate: 2.5 mmoL/L (07-11-21 @ 21:34)  ======================Micro/Rad/Cardio=================  Telemetry: Reviewed   EKG: Reviewed  CXR: Reviewed  Culture: Reviewed   Echo:   Cath:   ======================================================  PAST MEDICAL & SURGICAL HISTORY:  Afib    Hypertension    Hyperparathyroidism    Osteoporosis    Dyslipidemia    Macular degeneration    Temporal arteritis    Spinal stenosis    Osteoarthritis    Sternal fracture  2010    History of cholecystectomy    H/O nasal septoplasty    History of lumbar laminectomy  with microdiskectomy    H/O parathyroidectomy    H/O total knee replacement, left    History of temporal arteritis  b/l surgical repair    H/O bilateral cataract extraction

## 2021-07-12 NOTE — H&P ADULT - ATTENDING COMMENTS
Patient seen and examined. Agree with assessment and plan as outlined above.  85 yo F with paroxysmal a-fib with acute MI with continuous chest discomfort. Patient for [facility] pending renal evaluation. Severe LV systolic dysfunction with severe AS on echo.

## 2021-07-12 NOTE — H&P ADULT - ASSESSMENT
87 yo lady PMHx of HTN and paroxysmal Afib on coumadin transferred from OSH for late presentation MI pending LHC in AM.      85 yo lady PMHx of HTN and paroxysmal Afib on coumadin transferred from OSH for late presentation MI pending LHC in AM.     NEURO  - no active issue   - AOX4     #PULM  - no active issue   - SPO2>95 % on RA     #CARDIOLOGY  Late presentation MI   - pending high risk PCI in AM   - s/p ASA, brillinta load  - c/w DAPT (ASA, brillirta)   - start lipitor  - c/w nitro gtt for CP   - trend CE   - f/u lipid, AIC, TSH   - pending TTE   - will start heparin gtt once INR <2     HTN   - re-introduce home antihypertensive when able     HLD   - c/w lipitor     #GI  - DASH/TLC diet  - PPI for GERD     #RENAL/  CRISSY likely due to contrast load   - creat 1.8 unknown baseline   - cont to monitor BUN/Creat post cath   - monitor urine output     #ENDOCRINE   - no active issue   - f/u AIC   - cont to monitor glucose serum    #ID   - no active issues     #HEMATOLOGY   - no active issue   - cont to trend CBC     #ID   - no active issue          85 yo lady PMHx of HTN and paroxysmal Afib on coumadin transferred from OSH for late presentation MI pending LHC in AM.     NEURO  - no active issue   - AOX4     #PULM  - no active issue   - SPO2>95 % on RA     #CARDIOLOGY  Late presentation MI   - pending high risk PCI in AM   - s/p ASA, brillinta load  - c/w DAPT (ASA, brillirta)   - start lipitor  - c/w nitro gtt for CP   - trend CE   - f/u lipid, AIC, TSH   - pending TTE   - will start heparin gtt once INR <2     HTN   - re-introduce home antihypertensive when able     HLD   - c/w lipitor     #GI  - DASH/TLC diet  - PPI for GERD     #RENAL/  CRISSY likely due to contrast load   - creat 1.8 unknown baseline   - cont to monitor BUN/Creat post cath   - monitor urine output   - nephro consult     #ENDOCRINE   - no active issue   - f/u AIC   - cont to monitor glucose serum    #ID   - no active issues   - remains afebrile and no leukocytosis     #HEMATOLOGY   - no active issue   - H/H stable   - cont to trend CBC     #ID   - no active issue

## 2021-07-12 NOTE — H&P ADULT - NSHPPHYSICALEXAM_GEN_ALL_CORE
GENERAL: No acute distress, well-developed  HEAD:  Atraumatic, Normocephalic  EYES: EOMI, PERRLA, conjunctiva and sclera clear  NECK: Supple, no lymphadenopathy, no JVD  CHEST/LUNG: CTAB; No wheezes, rales, or rhonchi  HEART: Regular rate and rhythm. Normal S1/S2. No murmurs, rubs, or gallops  ABDOMEN: Soft, non-tender, non-distended; normal bowel sounds, no organomegaly  EXTREMITIES:  2+ peripheral pulses b/l, No clubbing, cyanosis, or edema  NEUROLOGY: A&O x 3, no focal deficits  SKIN: No rashes or lesions

## 2021-07-12 NOTE — CHART NOTE - NSCHARTNOTEFT_GEN_A_CORE
Removal of Femoral Sheath    Pulses in the right lower extremity are palpable. The patient was placed in the supine position. The insertion site was identified and the sutures were removed per protocol.  The 5 Chinese femoral sheath was then removed. Direct pressure was applied for 15 minutes.     Monitoring of the right groin and both lower extremities including neuro-vascular checks and vital signs every 15 minutes x 4, then every 30 minutes x 2, then every 1 hour was ordered.    Complications: None        Racquel Dubon [facility] [phone]

## 2021-07-12 NOTE — H&P ADULT - NSICDXPASTSURGICALHX_GEN_ALL_CORE_FT
PAST SURGICAL HISTORY:  H/O bilateral cataract extraction     H/O nasal septoplasty     H/O parathyroidectomy     H/O total knee replacement, left     History of cholecystectomy     History of lumbar laminectomy with microdiskectomy    History of temporal arteritis b/l surgical repair

## 2021-07-12 NOTE — CHART NOTE - NSCHARTNOTEFT_GEN_A_CORE
Padua Prediction Score for VTE Risk within 24hours of admission:    Active malignancy:                                                    [  ] YES +3, [ x ] NO   Previous VTE (Excluding Superficial Vein Thrombosis): [  ] YES +3, [  x] NO  Reduced mobility:                                                     [  ] YES +3, [  x] NO  Already known thrombophilic condition:                     [  ] YES +3, [ x ] NO  Recent (</=1 month trauma and/or surgery):             [  ] YES +2, [ x ] NO  Elderly age (>/=70):                                                  [ x ] YES +1, [  ] NO  Heart and/or Respiratory Failure:                               [ x ] YES +1, [  ] NO   Acute MI and/or ischemic CVA:                                  [ x ] YES +1, [  ] NO   Acute infection and/or rheumatologic disorder:          [  ] YES +1, [ x ] NO   BMI>/= 30:                                                              [  ] YES +1, [  x] NO   Ongoing hormonal treatment:                                   [  ] YES +1, [  x] NO    Total Score: [ 3 ]  points    [  x] Padua Score <  3: Low Risk of VTE         - Chemical Thromboprophylaxis should be considered on case-by-case basis  [  ] Padua Score >/= 4: High Risk of VTE         - Chemical Thromboprophylaxis is recommended for nonpregnant patients without contraindications (Major bleeding, thrombocytopenia) who are >/=  18 years of age                         VTE Prophylaxis Recommendations:  Mechanical Pneumatic Compression Devices                                [ x ]  Yes,  [  ] No, Contraindicated    Chemical VTE Prophylaxis (Heparin/ Lovenox/ Fondaparinux)        [  x ] Yes, to be ordered when INR<2,  [  ] No              [ ] Contraindicated, because _____              [ ] Already receiving Systemic Anticoagulation

## 2021-07-12 NOTE — PROGRESS NOTE ADULT - SUBJECTIVE AND OBJECTIVE BOX
ANDRE DE LUNA  MRN-[id number]  Patient is a 86y old  Female who presents with a chief complaint of late presentation MI (12 Jul 2021 12:45)    HPI:  85 yo lady PMHx of HTN and paroxysmal Afib on coumadin who was transferred to [facility] ED from [place] ED where she presented with 2 week hx of intermittent atypical chest pain, with acute exacerbation of chest pain radiating to back for the last 2 days, also associated with single episode of syncope at home today.     In the OSH ED, vitals - HR 80-90s Afib, -130s, RR 20, and SpO2 98% on 2L NC. Unremarkable exam reported. Labs c/w CRISSY on CKD (Cr 1.9 compared to 1.4 in 2019), with trop >40, pBNP 8543, and CKMB 94.5. CT chest angio (90cc contrast) without evidence of PE but inconclusive study on aortic dissection. Was transferred to [facility] ED for further management.    At the [facility] ED, vitals - HR 90-100s Afib, /60 at the bedside, RR 20, and SpO2 95% on room air. On physical exam, with JVD ~8cm, without pertinent cardiac or respiratory exam findings, without associated LE edema. Labs c/w CRISSY, hsTrop >10,000, lactate 2.5. EKG with Afib with RBBB, with Q waves on lateral leads. Bedside TTE with reduced EF ~15-20%. Repeat CT chest angio to r/o aortic dissection was negative for significant dissection.    Admitted to CICU for close monitoring, chest pain management on nitro gtt, pending LHC in AM      (12 Jul 2021 00:11)      Today: s/p diagnostic LHC revealing clear cors, via right fem.     REVIEW OF SYSTEMS:    CONSTITUTIONAL: No weakness, fevers or chills  EYES/ENT: No visual changes;  No vertigo or throat pain   NECK: No pain or stiffness  RESPIRATORY: No cough, wheezing, hemoptysis; No shortness of breath  CARDIOVASCULAR: No chest pain or palpitations  GASTROINTESTINAL: No abdominal or epigastric pain. No nausea, vomiting, or hematemesis; No diarrhea or constipation. No melena or hematochezia.  GENITOURINARY: No dysuria, frequency or hematuria  NEUROLOGICAL: No numbness or weakness  SKIN: No itching, rashes      Physical Exam:  Vital Signs Last 24 Hrs  T(C): 36.6 (12 Jul 2021 18:00), Max: 36.7 (11 Jul 2021 20:26)  T(F): 97.9 (12 Jul 2021 18:00), Max: 98 (11 Jul 2021 20:26)  HR: 82 (12 Jul 2021 18:30) (70 - 113)  BP: 125/80 (12 Jul 2021 01:30) (109/72 - 135/95)  BP(mean): 106 (12 Jul 2021 01:30) (90 - 119)  RR: 20 (12 Jul 2021 18:30) (15 - 37)  SpO2: 98% (12 Jul 2021 18:30) (91% - 100%)    Physical Exam:   Constitutional: NAD, well-groomed, well-developed  HEENT: PERRLA, EOMI, no drainage or redness  Neck: supple,  No JVD  Respiratory: Breath Sounds equal & clear bilaterally to auscultation, no rales/rhonchi/wheezing, no accessory muscle use noted  Cardiovascular: Regular rate, regular rhythm, normal S1, S2; no murmurs or rub  Gastrointestinal: Soft, non-tender, non distended, + bowel sounds  Extremities: MARQUEZ x 4, no peripheral edema, no cyanosis, no clubbing   Neurological: A+O x 3; speech clear and intact; no sensory, motor  deficits, normal reflexes  Skin: warm, dry, well perfused  Incisions: right groin soft without hematoma    ============================I/O===========================   I&O's Detail    11 Jul 2021 07:01  -  12 Jul 2021 07:00  --------------------------------------------------------  IN:    IV PiggyBack: 50 mL    Nitroglycerin: 30 mL    Oral Fluid: 200 mL  Total IN: 280 mL    OUT:    Voided (mL): 200 mL  Total OUT: 200 mL    Total NET: 80 mL      12 Jul 2021 07:01  -  12 Jul 2021 19:50  --------------------------------------------------------  IN:    Nitroglycerin: 60 mL    Oral Fluid: 520 mL    sodium chloride 0.9%: 400 mL  Total IN: 980 mL    OUT:    Voided (mL): 500 mL  Total OUT: 500 mL    Total NET: 480 mL        ============================ LABS =========================                        12.5   10.95 )-----------( 190      ( 12 Jul 2021 04:27 )             38.9     07-12    136  |  103  |  38<H>  ----------------------------<  173<H>  4.6   |  18<L>  |  1.88<H>    Ca    10.9<H>      12 Jul 2021 12:42  Phos  3.0     07-12  Mg     2.1     07-12    TPro  6.0  /  Alb  3.6  /  TBili  1.9<H>  /  DBili  x   /  AST  216<H>  /  ALT  52<H>  /  AlkPhos  67  07-12    LIVER FUNCTIONS - ( 12 Jul 2021 12:42 )  Alb: 3.6 g/dL / Pro: 6.0 g/dL / ALK PHOS: 67 U/L / ALT: 52 U/L / AST: 216 U/L / GGT: x           PT/INR - ( 12 Jul 2021 04:27 )   PT: 27.8 sec;   INR: 2.42 ratio         PTT - ( 12 Jul 2021 04:27 )  PTT:39.2 sec      ======================Micro/Rad/Cardio=================  Culture: Reviewed   CXR: Reviewed  Echo:Reviewed  ======================================================  PAST MEDICAL & SURGICAL HISTORY:  Afib    Hypertension    Hyperparathyroidism    Osteoporosis    Dyslipidemia    Macular degeneration    Temporal arteritis    Spinal stenosis    Osteoarthritis    Sternal fracture  2010    History of cholecystectomy    H/O nasal septoplasty    History of lumbar laminectomy  with microdiskectomy    H/O parathyroidectomy    H/O total knee replacement, left    History of temporal arteritis  b/l surgical repair    H/O bilateral cataract extraction

## 2021-07-13 NOTE — PHYSICAL THERAPY INITIAL EVALUATION ADULT - ADDITIONAL COMMENTS
Pt lives alone in a private home with 6-7 steps to negotiate. Pt stated she is in the process of moving out of the house into an apartment. PTA pt was (I) with ADLs and functional mobility with a rollator. Pt reported worsening balance recently and feels like she "sways" when amb with the rollator.

## 2021-07-13 NOTE — PHYSICAL THERAPY INITIAL EVALUATION ADULT - PRECAUTIONS/LIMITATIONS, REHAB EVAL
CT angio chest aorta 7/11: No aortic aneurysm, intramural hematoma or dissection. Cardiomegaly. Contrast reflux into the hepatic veins suggesting right-sided heart failure. Emphysema. Small right-sided pleural effusion. Right upper lobe 1.1 cm nodule. Colonic diverticulosis without diverticulitis. Small amount of ascites as described above. CT angio chest PE protocol 7/11: No pulmonary embolism. Inadequate aortic opacification to comment on aortic dissection. Emphysema. Cardiomegaly. Trace bilateral pleural effusions, right greater than left. CT head/C spine 7/11: No evidence acute hemorrhage, mass or mass effect. No acute fractures or dislocations involving the cervical spine. CT angio chest aorta 7/11: No aortic aneurysm, intramural hematoma or dissection. Cardiomegaly. Contrast reflux into the hepatic veins suggesting right-sided heart failure. Emphysema. Small right-sided pleural effusion. Right upper lobe 1.1 cm nodule. Colonic diverticulosis without diverticulitis. Small amount of ascites as described above. CT angio chest PE protocol 7/11: No pulmonary embolism. Inadequate aortic opacification to comment on aortic dissection. Emphysema. Cardiomegaly. Trace bilateral pleural effusions, right greater than left. CT head/C spine 7/11: No evidence acute hemorrhage, mass or mass effect. No acute fractures or dislocations involving the cervical spine./fall precautions

## 2021-07-13 NOTE — PHYSICAL THERAPY INITIAL EVALUATION ADULT - PLANNED THERAPY INTERVENTIONS, PT EVAL
GOAL: Pt will be able to negotiate 7 steps independently in 2 weeks./balance training/bed mobility training/gait training/transfer training

## 2021-07-13 NOTE — PROGRESS NOTE ADULT - ATTENDING COMMENTS
86 year-old woman with CRISSY after large contrast load.  Severely reduced LVEF with severe, low gradient aortic stenosis.  Monitor hemodynamics.  Appreciate renal consult from Dr. Thurston. Will attempt gentle hydration.  Structural Heart team evaluation for severe aortic stenosis - will consider trial of inotrope to assess contribution of aortic stenosis vs. pseudo-stenosis in the setting of severe LV dysfunction.

## 2021-07-13 NOTE — PROCEDURE NOTE - NSICDXPROCEDURE_GEN_ALL_CORE_FT
PROCEDURES:  Insertion, catheter, non-tunneled, age 5 years or older 13-Jul-2021 22:21:16  Kylie Silvestre

## 2021-07-13 NOTE — PROGRESS NOTE ADULT - SUBJECTIVE AND OBJECTIVE BOX
[facility]   [phone]  RENAL FOLLOW UP NOTE  --------------------------------------------------------------------------------  Chief Complaint:    24 hour events/subjective:    seen earlier   states is not short of breath     PAST HISTORY  --------------------------------------------------------------------------------  No significant changes to PMH, PSH, FHx, SHx, unless otherwise noted    ALLERGIES & MEDICATIONS  --------------------------------------------------------------------------------  Allergies    adhesives (Rash)  No Known Drug Allergies    Intolerances      Standing Inpatient Medications  aspirin enteric coated 81 milliGRAM(s) Oral daily  atorvastatin 80 milliGRAM(s) Oral at bedtime  chlorhexidine 2% Cloths 1 Application(s) Topical daily  DOBUTamine Infusion 2.5 MICROgram(s)/kG/Min IV Continuous <Continuous>  norepinephrine Infusion 0.05 MICROgram(s)/kG/Min IV Continuous <Continuous>  sodium bicarbonate 650 milliGRAM(s) Oral three times a day  sodium chloride 0.9%. 1000 milliLiter(s) IV Continuous <Continuous>    PRN Inpatient Medications      REVIEW OF SYSTEMS  --------------------------------------------------------------------------------    Gen: denieschills,  CVS: denies chest pain/palpitations  Resp: denies SOB/Cough  GI: Denies N/V/Abd pain  : Denies dysuria    All other systems were reviewed and are negative, except as noted.    VITALS/PHYSICAL EXAM  --------------------------------------------------------------------------------  T(C): 36.9 (07-13-21 @ 19:00), Max: 37.1 (07-13-21 @ 16:00)  HR: 96 (07-13-21 @ 19:30) (76 - 110)  BP: 78/50 (07-13-21 @ 16:30) (67/46 - 90/64)  RR: 30 (07-13-21 @ 19:30) (20 - 39)  SpO2: 98% (07-13-21 @ 19:30) (90% - 100%)  Wt(kg): --  Height (cm): 167.6 (07-11-21 @ 23:40)  Weight (kg): 59.1 (07-11-21 @ 23:40)  BMI (kg/m2): 21 (07-11-21 @ 23:40)  BSA (m2): 1.67 (07-11-21 @ 23:40)      07-12-21 @ 07:01  -  07-13-21 @ 07:00  --------------------------------------------------------  IN: 1340 mL / OUT: 700 mL / NET: 640 mL    07-13-21 @ 07:01  -  07-13-21 @ 20:01  --------------------------------------------------------  IN: 1476.6 mL / OUT: 400 mL / NET: 1076.6 mL      Physical Exam:  	    Gen: pale appearing   	 - JVD  	Pulm: decrease bs  no rales or ronchi or wheezing  	CV: RRR, S1S2; no rub III/VI M   	Abd: +BS, soft, nontender/nondistended  	: No suprapubic tenderness  	UE: Warm, no cyanosis  no clubbing,  no edema  	LE: Warm, no cyanosis  no clubbing, no edema  	Neuro: alert and oriented. speech coherent   	      LABS/STUDIES  --------------------------------------------------------------------------------              11.0   11.09 >-----------<  166      [07-13-21 @ 18:05]              32.2     133  |  104  |  49  ----------------------------<  109      [07-13-21 @ 18:04]  4.5   |  17  |  3.01        Ca     9.4     [07-13-21 @ 18:04]      Mg     2.0     [07-13-21 @ 18:04]      Phos  2.5     [07-13-21 @ 18:04]    TPro  5.3  /  Alb  3.0  /  TBili  1.5  /  DBili  x   /  AST  118  /  ALT  53  /  AlkPhos  59  [07-13-21 @ 18:04]    PT/INR: PT 42.9 , INR 3.81       [07-13-21 @ 00:59]  PTT: 38.1       [07-13-21 @ 00:59]          [07-13-21 @ 18:04]    Creatinine Trend:  SCr 3.01 [07-13 @ 18:04]  SCr 2.19 [07-13 @ 00:59]  SCr 1.88 [07-12 @ 12:42]  SCr 1.89 [07-12 @ 04:27]  SCr 1.80 [07-11 @ 21:34]                  TSH 2.32      [07-12-21 @ 08:24]  Lipid: chol 105, TG 89, HDL 51, LDL --      [07-12-21 @ 08:26]

## 2021-07-13 NOTE — PROGRESS NOTE ADULT - ASSESSMENT
85 yo lady PMHx of HTN and paroxysmal Afib on coumadin who was transferred to [facility] ED from [place] ED where she presented with 2 week hx of intermittent chest pain, with acute exacerbation of chest pain radiating to back  associated with single episode of syncope at home on day of admission. 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      1- CKD III/IV with CRISSY   2- a fib  3- chest pains   4- CHF  5- aortic stenosis   6- hypercalcemia       crissy in setting of contrast nephropathy   pt also with severe AS   gentle ivf hydration as tolerated  strict I/O  trend ca+ to have intact pth and phos   d/w CCU team when seen earlier.

## 2021-07-13 NOTE — PHYSICAL THERAPY INITIAL EVALUATION ADULT - PERTINENT HX OF CURRENT PROBLEM, REHAB EVAL
Pt is an 85 y/o lady with PMH of HTN and paroxysmal Afib on coumadin who was presented from [place] ED where she presented with 2 week hx of intermittent atypical chest pain, with acute exacerbation of chest pain radiating to back for the last 2 days, also associated with single episode of syncope at home today. Pt transferred to [facility] for late presentation inferolateral wall STEMI c/b CRISSY with continued CP requiring nitro gtt. P Pt s/p LHC revealing clear cors.

## 2021-07-13 NOTE — PROGRESS NOTE ADULT - SUBJECTIVE AND OBJECTIVE BOX
ANDRE DE LUNA  MRN-[id number]  Patient is a 86y old  Female who presents with a chief complaint of late presentation MI (2021 19:50)    HPI:  87 yo lady PMHx of HTN and paroxysmal Afib on coumadin who was transferred to [facility] ED from [place] ED where she presented with 2 week hx of intermittent atypical chest pain, with acute exacerbation of chest pain radiating to back for the last 2 days, also associated with single episode of syncope at home today.     In the OSH ED, vitals - HR 80-90s Afib, -130s, RR 20, and SpO2 98% on 2L NC. Unremarkable exam reported. Labs c/w CRISSY on CKD (Cr 1.9 compared to 1.4 in 2019), with trop >40, pBNP 8543, and CKMB 94.5. CT chest angio (90cc contrast) without evidence of PE but inconclusive study on aortic dissection. Was transferred to [facility] ED for further management.    At the [facility] ED, vitals - HR 90-100s Afib, /60 at the bedside, RR 20, and SpO2 95% on room air. On physical exam, with JVD ~8cm, without pertinent cardiac or respiratory exam findings, without associated LE edema. Labs c/w CRISSY, hsTrop >10,000, lactate 2.5. EKG with Afib with RBBB, with Q waves on lateral leads. Bedside TTE with reduced EF ~15-20%. Repeat CT chest angio to r/o aortic dissection was negative for significant dissection.    Admitted to CICU for close monitoring, chest pain management on nitro gtt, pending [facility] in AM      (2021 00:11)      24 HOUR EVENTS:    REVIEW OF SYSTEMS:    CONSTITUTIONAL: No weakness, fevers or chills  EYES/ENT: No visual changes;  No vertigo or throat pain   NECK: No pain or stiffness  RESPIRATORY: No cough, wheezing, hemoptysis; No shortness of breath  CARDIOVASCULAR: No chest pain or palpitations  GASTROINTESTINAL: No abdominal or epigastric pain. No nausea, vomiting, or hematemesis; No diarrhea or constipation. No melena or hematochezia.  GENITOURINARY: No dysuria, frequency or hematuria  NEUROLOGICAL: No numbness or weakness  SKIN: No itching, rashes      ICU Vital Signs Last 24 Hrs  T(C): 36.8 (2021 04:00), Max: 36.8 (2021 04:00)  T(F): 98.2 (2021 04:00), Max: 98.2 (2021 04:00)  HR: 88 (2021 06:00) (70 - 110)  BP: --  BP(mean): --  ABP: 107/76 (2021 06:00) (88/50 - 130/84)  ABP(mean): 89 (2021 06:00) (66 - 104)  RR: 22 (2021 06:00) (15 - 37)  SpO2: 91% (2021 06:00) (89% - 100%)      CVP(mm Hg): --  CO: --  CI: --  PA: --  PA(mean): --  PA(direct): --  PCWP: --  LA: --  RA: --  SVR: --  SVRI: --  PVR: --  PVRI: --  I&O's Summary    2021 07:01  -  2021 07:00  --------------------------------------------------------  IN: 1340 mL / OUT: 700 mL / NET: 640 mL        CAPILLARY BLOOD GLUCOSE    CAPILLARY BLOOD GLUCOSE          PHYSICAL EXAM:  GENERAL: No acute distress, well-developed  HEAD:  Atraumatic, Normocephalic  EYES: EOMI, PERRLA, conjunctiva and sclera clear  NECK: Supple, no lymphadenopathy, no JVD  CHEST/LUNG: CTAB; No wheezes, rales, or rhonchi  HEART: Regular rate and rhythm. Normal S1/S2. No murmurs, rubs, or gallops  ABDOMEN: Soft, non-tender, non-distended; normal bowel sounds, no organomegaly  EXTREMITIES:  2+ peripheral pulses b/l, No clubbing, cyanosis, or edema  NEUROLOGY: A&O x 3, no focal deficits  SKIN: No rashes or lesions    ============================I/O===========================   I&O's Detail    2021 07:01  -  2021 07:00  --------------------------------------------------------  IN:    Nitroglycerin: 60 mL    Oral Fluid: 880 mL    sodium chloride 0.9%: 400 mL  Total IN: 1340 mL    OUT:    Voided (mL): 700 mL  Total OUT: 700 mL    Total NET: 640 mL        ============================ LABS =========================                        12.0    )-----------( 170      ( 2021 00:59 )             35.7     07-13    133<L>  |  104  |  42<H>  ----------------------------<  128<H>  4.8   |  16<L>  |  2.19<H>    Ca    10.0      2021 00:59  Phos  3.0       Mg     2.0         TPro  5.6<L>  /  Alb  3.2<L>  /  TBili  1.8<H>  /  DBili  x   /  AST  161<H>  /  ALT  51<H>  /  AlkPhos  62      Troponin T, High Sensitivity Result: 8424 ng/L (21 @ 12:42)  Troponin T, High Sensitivity Result: 9486 ng/L (21 @ 04:27)  Troponin T, High Sensitivity Result: >82065 ng/L (21 @ 21:34)    CKMB Units: 73.5 ng/mL (21 @ 12:42)  CKMB Units: 93.1 ng/mL (21 @ 04:27)  CKMB Units: 98.0 ng/mL (21 @ 21:34)  CKMB Units: 94.5 ng/mL (21 @ 19:19)    Creatine Kinase, Serum: 745 U/L (21 @ 12:42)  Creatine Kinase, Serum: 926 U/L (21 @ 04:27)  Creatine Kinase, Serum: 1031 U/L (21 @ 21:34)  Creatine Kinase, Serum: 885 U/L (21 @ 19:19)    CPK Mass Assay %: 9.9 % (21 @ 12:42)  CPK Mass Assay %: 10.1 % (21 @ 04:27)  CPK Mass Assay %: 9.5 % (21 @ 21:34)  CPK Mass Assay %: 10.7 % (21 @ 19:19)        LIVER FUNCTIONS - ( 2021 00:59 )  Alb: 3.2 g/dL / Pro: 5.6 g/dL / ALK PHOS: 62 U/L / ALT: 51 U/L / AST: 161 U/L / GGT: x           PT/INR - ( 2021 00:59 )   PT: 42.9 sec;   INR: 3.81 ratio         PTT - ( 2021 00:59 )  PTT:38.1 sec    Lactate, Blood: 1.9 mmol/L (21 @ 04:27)  Blood Gas Venous - Lactate: 2.5 mmoL/L (21 @ 21:34)      ======================Micro/Rad/Cardio=================  Telemetry: Reviewed   EKG: Reviewed  CXR: Reviewed  Culture: Reviewed   Echo:   Cath: Cardiac Cath Lab - Adult:   [facility]  Department of Cardiology  [address] [place] [id number] [phone]  Cath Lab Report -- Comprehensive Report  Patient: ANDRE DE LUNA  Study date: 2021  Account number: [id number]  MR number:[id number]  : 1935  Gender: Female  Race: W  Case Physician(s):  Jack Chatterjee M.D.  Fellow:  HI Mckenna M.D.  Referring Physician:  Arsenio Hebert M.D.  INDICATIONS: Initial NSTEMI.  HISTORY: Aortic valve: history of severe stenosis. The patient has  hypertension.  PROCEDURE:  --  Left coronary angiography.  --  Right coronary angiography.  --  Sonosite - Diagnostic.  TECHNIQUE: The risks and alternatives of the procedures and conscious  sedation were explained to the patient and informed consent was obtained.  Cardiac catheterization performed electively.  Local anesthetic given. Right femoral artery access. Left coronary artery  angiography. The vessel was injected utilizing a catheter. Right coronary  artery angiography. The vessel was injected utilizing a catheter. Sonosite  - Diagnostic. RADIATION EXPOSURE: 1.4 min.  CONTRAST GIVEN: Omnipaque 19 ml.  MEDICATIONS GIVEN: Midazolam, 0.5 mg, IV. Fentanyl, 25 mcg, IV.  CORONARY VESSELS: The coronary circulation is right dominant.  LM:   --  LM: Angiography showed minor luminal irregularities with no flow  limiting lesions.  LAD:   --  LAD: Angiography showed minor luminal irregularities with no  flow limiting lesions.  CX:   --  Circumflex: Angiography showed minor luminal irregularities with  no flow limiting lesions.  RCA:   --  RCA: Angiography showed minor luminal irregularities with no  flow limiting lesions.  COMPLICATIONS: There were no complications.  DIAGNOSTIC RECOMMENDATIONS: The patient should continue with the present  medications. Evalaution for TAVR  Prepared and signed by  Jack Chatterjee M.D.  Signed 2021 15:18:45  HEMODYNAMIC TABLES  Outputs:  Baseline  Outputs:  -- CALCULATIONS[age]  Outputs:  -- CALCULATIONS: Body Surface Area: 1.66  Outputs:  -- CALCULATIONS: Height in cm: 167.00  Outputs:  -- CALCULATIONS: Sex: Female  Outputs:  -- CALCULATIONS: Weight in k.00  Outputs:  -- OUTPUTS: O2 consumption: 207.66  Outputs:  -- OUTPUTS: Vo2 Indexed: 125.00 (21 @ 13:23)    ======================================================  PAST MEDICAL & SURGICAL HISTORY:  Afib    Hypertension    Hyperparathyroidism    Osteoporosis    Dyslipidemia    Macular degeneration    Temporal arteritis    Spinal stenosis    Osteoarthritis    Sternal fracture  2010    History of cholecystectomy    H/O nasal septoplasty    History of lumbar laminectomy  with microdiskectomy    H/O parathyroidectomy    H/O total knee replacement, left    History of temporal arteritis  b/l surgical repair    H/O bilateral cataract extraction

## 2021-07-13 NOTE — PROGRESS NOTE ADULT - SUBJECTIVE AND OBJECTIVE BOX
ANDRE DE LUNA  MRN-[id number]  Patient is a 86y old  Female who presents with a chief complaint of late presentation MI (13 Jul 2021 20:00)    HPI:  85 yo lady PMHx of HTN and paroxysmal Afib on coumadin who was transferred to [facility] ED from [place] ED where she presented with 2 week hx of intermittent atypical chest pain, with acute exacerbation of chest pain radiating to back for the last 2 days, also associated with single episode of syncope at home today.     In the OSH ED, vitals - HR 80-90s Afib, -130s, RR 20, and SpO2 98% on 2L NC. Unremarkable exam reported. Labs c/w CRISSY on CKD (Cr 1.9 compared to 1.4 in 2019), with trop >40, pBNP 8543, and CKMB 94.5. CT chest angio (90cc contrast) without evidence of PE but inconclusive study on aortic dissection. Was transferred to [facility] ED for further management.    At the [facility] ED, vitals - HR 90-100s Afib, /60 at the bedside, RR 20, and SpO2 95% on room air. On physical exam, with JVD ~8cm, without pertinent cardiac or respiratory exam findings, without associated LE edema. Labs c/w CRISSY, hsTrop >10,000, lactate 2.5. EKG with Afib with RBBB, with Q waves on lateral leads. Bedside TTE with reduced EF ~15-20%. Repeat CT chest angio to r/o aortic dissection was negative for significant dissection.    Admitted to CICU for close monitoring, chest pain management on nitro gtt, pending [facility] in AM      (12 Jul 2021 00:11)      Hospital Course:  7/12 transferred to CCU for further management     24 HOUR EVENTS:    REVIEW OF SYSTEMS:   Constitutional: No weakness, fevers, or chills  Eyes/ENT: No visual changes  Respiratory: No cough, wheezing, hemoptysis  Cardiovascular: No chest pain, no palpitations  Gastrointestinal: No abdominal pain. No nausea, vomiting, hematemesis.   Genitourinary: No dysuria  Neurological: No numbness, no weakness  Skin: No itching, rashes    ICU Vital Signs Last 24 Hrs  T(C): 36.9 (13 Jul 2021 19:00), Max: 37.1 (13 Jul 2021 16:00)  T(F): 98.4 (13 Jul 2021 19:00), Max: 98.7 (13 Jul 2021 16:00)  HR: 100 (13 Jul 2021 20:00) (76 - 110)  BP: 78/50 (13 Jul 2021 16:30) (67/46 - 90/64)  BP(mean): 59 (13 Jul 2021 16:30) (55 - 73)  ABP: 104/60 (13 Jul 2021 20:00) (74/32 - 116/78)  ABP(mean): 76 (13 Jul 2021 20:00) (48 - 92)  RR: 29 (13 Jul 2021 20:00) (20 - 39)  SpO2: 96% (13 Jul 2021 20:00) (90% - 100%)        I&O's Summary    12 Jul 2021 07:01  -  13 Jul 2021 07:00  --------------------------------------------------------  IN: 1340 mL / OUT: 700 mL / NET: 640 mL    13 Jul 2021 07:01  -  13 Jul 2021 21:04  --------------------------------------------------------  IN: 1476.6 mL / OUT: 400 mL / NET: 1076.6 mL      PHYSICAL EXAM:   General: No acute distress  Eyes: EOMI, PERRLA, conjunctiva and sclera clear  Chest/Lung: CTAB, no wheezes, rales, or rhonchi  Heart: Regular rate, regular rhythm. Normal S1/S2. No murmurs, rubs, or gallops.  Abdomen: Soft, nontender, nondistended. Normal bowel sounds.  Extremities: 2+ peripheral pulses B/L. No clubbing, cyanosis, or edema.  Neurology: A&O x3, no focal deficits  Skin: No rashes or lesions  ============================I/O===========================   I&O's Detail    12 Jul 2021 07:01  -  13 Jul 2021 07:00  --------------------------------------------------------  IN:    Nitroglycerin: 60 mL    Oral Fluid: 880 mL    sodium chloride 0.9%: 400 mL  Total IN: 1340 mL    OUT:    Voided (mL): 700 mL  Total OUT: 700 mL    Total NET: 640 mL      13 Jul 2021 07:01  -  13 Jul 2021 21:04  --------------------------------------------------------  IN:    Norepinephrine: 6.6 mL    Oral Fluid: 520 mL    sodium chloride 0.9%: 200 mL    Sodium Chloride 0.9% Bolus: 750 mL  Total IN: 1476.6 mL    OUT:    Indwelling Catheter - Urethral (mL): 150 mL    Voided (mL): 250 mL  Total OUT: 400 mL    Total NET: 1076.6 mL        ============================ LABS =========================                        11.0   11.09 )-----------( 166      ( 13 Jul 2021 18:05 )             32.2     07-13    133<L>  |  104  |  49<H>  ----------------------------<  109<H>  4.5   |  17<L>  |  3.01<H>    Ca    9.4      13 Jul 2021 18:04  Phos  2.5     07-13  Mg     2.0     07-13    TPro  5.3<L>  /  Alb  3.0<L>  /  TBili  1.5<H>  /  DBili  x   /  AST  118<H>  /  ALT  53<H>  /  AlkPhos  59  07-13    Troponin T, High Sensitivity Result: >73516 ng/L (07-13-21 @ 18:04)  Troponin T, High Sensitivity Result: 8424 ng/L (07-12-21 @ 12:42)  Troponin T, High Sensitivity Result: 9486 ng/L (07-12-21 @ 04:27)  Troponin T, High Sensitivity Result: >22765 ng/L (07-11-21 @ 21:34)    CKMB Units: 26.6 ng/mL (07-13-21 @ 18:04)  CKMB Units: 73.5 ng/mL (07-12-21 @ 12:42)  CKMB Units: 93.1 ng/mL (07-12-21 @ 04:27)  CKMB Units: 98.0 ng/mL (07-11-21 @ 21:34)  CKMB Units: 94.5 ng/mL (07-11-21 @ 19:19)    Creatine Kinase, Serum: 359 U/L (07-13-21 @ 18:04)  Creatine Kinase, Serum: 745 U/L (07-12-21 @ 12:42)  Creatine Kinase, Serum: 926 U/L (07-12-21 @ 04:27)  Creatine Kinase, Serum: 1031 U/L (07-11-21 @ 21:34)  Creatine Kinase, Serum: 885 U/L (07-11-21 @ 19:19)    CPK Mass Assay %: 7.4 % (07-13-21 @ 18:04)  CPK Mass Assay %: 9.9 % (07-12-21 @ 12:42)  CPK Mass Assay %: 10.1 % (07-12-21 @ 04:27)  CPK Mass Assay %: 9.5 % (07-11-21 @ 21:34)  CPK Mass Assay %: 10.7 % (07-11-21 @ 19:19)        LIVER FUNCTIONS - ( 13 Jul 2021 18:04 )  Alb: 3.0 g/dL / Pro: 5.3 g/dL / ALK PHOS: 59 U/L / ALT: 53 U/L / AST: 118 U/L / GGT: x           PT/INR - ( 13 Jul 2021 00:59 )   PT: 42.9 sec;   INR: 3.81 ratio         PTT - ( 13 Jul 2021 00:59 )  PTT:38.1 sec    Blood Gas Venous - Lactate: 2.0 mmoL/L (07-13-21 @ 20:41)  Lactate, Blood: 1.0 mmol/L (07-13-21 @ 18:04)  Lactate, Blood: 1.9 mmol/L (07-12-21 @ 04:27)  Blood Gas Venous - Lactate: 2.5 mmoL/L (07-11-21 @ 21:34)      ======================Micro/Rad/Cardio=================  Telemetry: Reviewed   EKG: Reviewed  CXR: Reviewed  Echo: Reviewed  Cath: Reviewed  ======================================================  PAST MEDICAL & SURGICAL HISTORY:  Afib    Hypertension    Hyperparathyroidism    Osteoporosis    Dyslipidemia    Macular degeneration    Temporal arteritis    Spinal stenosis    Osteoarthritis    Sternal fracture  2010    History of cholecystectomy    H/O nasal septoplasty    History of lumbar laminectomy  with microdiskectomy    H/O parathyroidectomy    H/O total knee replacement, left    History of temporal arteritis  b/l surgical repair    H/O bilateral cataract extraction      ====================ASSESSMENT ==============  Late presentation inferolateral STEMI  CRISSY likely in setting of BLANCA      Plan:  ====================== NEUROLOGY=====================  AOx3  - no acute issues   - tylenol PRN for analgesia/fever   - sleep regimen with melatonin     acetaminophen   Tablet .. 650 milliGRAM(s) Oral every 6 hours PRN Temp greater or equal to 38C (100.4F), Mild Pain (1 - 3), Moderate Pain (4 - 6)  melatonin 3 milliGRAM(s) Oral at bedtime    ==================== RESPIRATORY======================  Stable on RA, SpO2 %  - continue pulse ox monitoring, follow ABGs     ====================CARDIOVASCULAR==================  Late presentation inferolateral STEMI  - s/p ASA/Brilinta load, c/w DAPT  - c/w Lipitor to 80mg, trend LFTs  - TTE 7/12 EF 25%, severe segmental LV systolic dysfunction   - Strict bedrest given persistent CP   - Ischemic eval pending renal   - inotropic support with IV Dobutamine   - pressor support with IV Levophed   - CE trending down    aspirin enteric coated 81 milliGRAM(s) Oral daily  atorvastatin 80 milliGRAM(s) Oral at bedtime  DOBUTamine Infusion 2.5 MICROgram(s)/kG/Min (4.43 mL/Hr) IV Continuous <Continuous>  norepinephrine Infusion 0.05 MICROgram(s)/kG/Min (5.54 mL/Hr) IV Continuous <Continuous>    ===================HEMATOLOGIC/ONC ===================  No acute issues   - monitor H&H/Plts     ===================== RENAL =========================  CRISSY likely in setting of BLANCA  - SCr baseline appears ~1.4 (2019)  - Avoid nephrotoxins, renally dose meds  - Renal consult     ==================== GASTROINTESTINAL===================  sodium bicarbonate 650 milliGRAM(s) Oral three times a day  sodium chloride 0.9%. 1000 milliLiter(s) (50 mL/Hr) IV Continuous <Continuous>    =======================    ENDOCRINE  =====================  No acute issues   - continue monitoring blood glucose closely for need to initiate sliding scale     ========================INFECTIOUS DISEASE================  Afebrile, WBC within normal limits  - Continue trending WBC and monitoring fever curve       Patient requires continuous monitoring with bedside rhythm monitoring, pulse ox monitoring, and intermittent blood gas analysis. Care plan discussed with ICU care team. Patient remained critical and at risk for life threatening decompensation.  Patient seen, examined and plan discussed with CCU team during rounds.     I have personally provided 35 minutes of critical care time excluding time spent on separate procedures.    By signing my name below, I, Kerri Gordon, attest that this documentation has been prepared under the direction and in the presence of SHELBY Barnett   Electronically signed: Timothy Ayers, 07-13-21 @ 21:04    I, Kylie Silvestre, personally performed the services described in this documentation. all medical record entries made by the timothy were at my direction and in my presence. I have reviewed the chart and agree that the record reflects my personal performance and is accurate and complete  Electronically signed: SHELBY Barnett        ANDRE DE LUNA  MRN-[id number]  Patient is a 86y old  Female who presents with a chief complaint of late presentation MI (2021 20:00)    HPI:  85 yo lady PMHx of HTN and paroxysmal Afib on coumadin who was transferred to [facility] ED from [place] ED where she presented with 2 week hx of intermittent atypical chest pain, with acute exacerbation of chest pain radiating to back for the last 2 days, also associated with single episode of syncope at home today.     In the OSH ED, vitals - HR 80-90s Afib, -130s, RR 20, and SpO2 98% on 2L NC. Unremarkable exam reported. Labs c/w CRISSY on CKD (Cr 1.9 compared to 1.4 in 2019), with trop >40, pBNP 8543, and CKMB 94.5. CT chest angio (90cc contrast) without evidence of PE but inconclusive study on aortic dissection. Was transferred to [facility] ED for further management.    At the [facility] ED, vitals - HR 90-100s Afib, /60 at the bedside, RR 20, and SpO2 95% on room air. On physical exam, with JVD ~8cm, without pertinent cardiac or respiratory exam findings, without associated LE edema. Labs c/w CRISSY, hsTrop >10,000, lactate 2.5. EKG with Afib with RBBB, with Q waves on lateral leads. Bedside TTE with reduced EF ~15-20%. Repeat CT chest angio to r/o aortic dissection was negative for significant dissection.    Admitted to CICU for close monitoring, chest pain management on nitro gtt, pending [facility] in AM      (2021 00:11)      Hospital Course:   transferred to CCU for further management     24 HOUR EVENTS: RIJ TLC placed and patient started on dobutamine and levo. Will send VBG and transduce CVP.     REVIEW OF SYSTEMS:   Constitutional: No weakness, fevers, or chills  Eyes/ENT: No visual changes  Respiratory: No cough, wheezing, hemoptysis  Cardiovascular: No chest pain, no palpitations  Gastrointestinal: No abdominal pain. No nausea, vomiting, hematemesis.   Genitourinary: No dysuria  Neurological: No numbness, no weakness  Skin: No itching, rashes    ICU Vital Signs Last 24 Hrs  T(C): 36.9 (2021 19:00), Max: 37.1 (2021 16:00)  T(F): 98.4 (2021 19:00), Max: 98.7 (2021 16:00)  HR: 100 (2021 20:00) (76 - 110)  BP: 78/50 (2021 16:30) (67/46 - 90/64)  BP(mean): 59 (2021 16:30) (55 - 73)  ABP: 104/60 (2021 20:00) (74/32 - 116/78)  ABP(mean): 76 (2021 20:00) (48 - 92)  RR: 29 (2021 20:00) (20 - 39)  SpO2: 96% (2021 20:00) (90% - 100%)        I&O's Summary    2021 07:01  -  2021 07:00  --------------------------------------------------------  IN: 1340 mL / OUT: 700 mL / NET: 640 mL    2021 07:01  -  2021 21:04  --------------------------------------------------------  IN: 1476.6 mL / OUT: 400 mL / NET: 1076.6 mL      PHYSICAL EXAM:   General: No acute distress  Eyes: EOMI, PERRLA, conjunctiva and sclera clear  Chest/Lung: CTAB, no wheezes, rales, or rhonchi  Heart: Regular rate, regular rhythm. Normal S1/S2. No murmurs, rubs, or gallops.  Abdomen: Soft, nontender, nondistended. Normal bowel sounds.  Extremities: 2+ peripheral pulses B/L. No clubbing, cyanosis, or edema.  Neurology: A&O x3, no focal deficits  Skin: No rashes or lesions  ============================I/O===========================   I&O's Detail    2021 07:01  -  2021 07:00  --------------------------------------------------------  IN:    Nitroglycerin: 60 mL    Oral Fluid: 880 mL    sodium chloride 0.9%: 400 mL  Total IN: 1340 mL    OUT:    Voided (mL): 700 mL  Total OUT: 700 mL    Total NET: 640 mL      2021 07:01  -  2021 21:04  --------------------------------------------------------  IN:    Norepinephrine: 6.6 mL    Oral Fluid: 520 mL    sodium chloride 0.9%: 200 mL    Sodium Chloride 0.9% Bolus: 750 mL  Total IN: 1476.6 mL    OUT:    Indwelling Catheter - Urethral (mL): 150 mL    Voided (mL): 250 mL  Total OUT: 400 mL    Total NET: 1076.6 mL        ============================ LABS =========================                        11.0   11.09 )-----------( 166      ( 2021 18:05 )             32.2     07-13    133<L>  |  104  |  49<H>  ----------------------------<  109<H>  4.5   |  17<L>  |  3.01<H>    Ca    9.4      2021 18:04  Phos  2.5     07-13  Mg     2.0     07-13    TPro  5.3<L>  /  Alb  3.0<L>  /  TBili  1.5<H>  /  DBili  x   /  AST  118<H>  /  ALT  53<H>  /  AlkPhos  59      Troponin T, High Sensitivity Result: >51857 ng/L (21 @ 18:04)  Troponin T, High Sensitivity Result: 8424 ng/L (21 @ 12:42)  Troponin T, High Sensitivity Result: 9486 ng/L (21 @ 04:27)  Troponin T, High Sensitivity Result: >44778 ng/L (21 @ 21:34)    CKMB Units: 26.6 ng/mL (21 @ 18:04)  CKMB Units: 73.5 ng/mL (21 @ 12:42)  CKMB Units: 93.1 ng/mL (21 @ 04:27)  CKMB Units: 98.0 ng/mL (21 @ 21:34)  CKMB Units: 94.5 ng/mL (21 @ 19:19)    Creatine Kinase, Serum: 359 U/L (21 @ 18:04)  Creatine Kinase, Serum: 745 U/L (21 @ 12:42)  Creatine Kinase, Serum: 926 U/L (21 @ 04:27)  Creatine Kinase, Serum: 1031 U/L (21 @ 21:34)  Creatine Kinase, Serum: 885 U/L (21 @ 19:19)    CPK Mass Assay %: 7.4 % (21 @ 18:04)  CPK Mass Assay %: 9.9 % (21 @ 12:42)  CPK Mass Assay %: 10.1 % (21 @ 04:27)  CPK Mass Assay %: 9.5 % (21 @ 21:34)  CPK Mass Assay %: 10.7 % (21 @ 19:19)        LIVER FUNCTIONS - ( 2021 18:04 )  Alb: 3.0 g/dL / Pro: 5.3 g/dL / ALK PHOS: 59 U/L / ALT: 53 U/L / AST: 118 U/L / GGT: x           PT/INR - ( 2021 00:59 )   PT: 42.9 sec;   INR: 3.81 ratio         PTT - ( 2021 00:59 )  PTT:38.1 sec    Blood Gas Venous - Lactate: 2.0 mmoL/L (21 @ 20:41)  Lactate, Blood: 1.0 mmol/L (21 @ 18:04)  Lactate, Blood: 1.9 mmol/L (21 @ 04:27)  Blood Gas Venous - Lactate: 2.5 mmoL/L (21 @ 21:34)      ======================Micro/Rad/Cardio=================  Telemetry: Reviewed   EKG: Reviewed  CXR: Reviewed  Echo: Reviewed  Cath: Reviewed  ======================================================  PAST MEDICAL & SURGICAL HISTORY:  Afib    Hypertension    Hyperparathyroidism    Osteoporosis    Dyslipidemia    Macular degeneration    Temporal arteritis    Spinal stenosis    Osteoarthritis    Sternal fracture  2010    History of cholecystectomy    H/O nasal septoplasty    History of lumbar laminectomy  with microdiskectomy    H/O parathyroidectomy    H/O total knee replacement, left    History of temporal arteritis  b/l surgical repair    H/O bilateral cataract extraction      ====================ASSESSMENT ==============  Late presentation inferolateral STEMI  CRISSY likely in setting of BLANCA      Plan:  ====================== NEUROLOGY=====================  AOx3  - no acute issues   - tylenol PRN for analgesia/fever   - sleep regimen with melatonin     acetaminophen   Tablet .. 650 milliGRAM(s) Oral every 6 hours PRN Temp greater or equal to 38C (100.4F), Mild Pain (1 - 3), Moderate Pain (4 - 6)  melatonin 3 milliGRAM(s) Oral at bedtime    ==================== RESPIRATORY======================  Stable on RA, SpO2 96%  - continue pulse ox monitoring, follow ABGs     ====================CARDIOVASCULAR==================  Late presentation inferolateral STEMI c/b cardiogenic shock  - LHC revealing clear cors  - presentation possibly 2/2 tako vs severe AS vs myopericarditis   - cont ASA statin and BB   - trend CE   - patient acutely hypotensive today, received multiple fluid boluses during the day and placed on NS 50 per hr.   - Overnight patient started on  and levo, worsening renal fxn noted   - TLC and [place] Laura placed   - monitor CVPs, VBGs and uptitrate inotrope as needed   - f/u repeat echo in the AM to evaluate for mechanical complications given hypotension     Severe AS  - structural eval noted  - cont     aspirin enteric coated 81 milliGRAM(s) Oral daily  atorvastatin 80 milliGRAM(s) Oral at bedtime  DOBUTamine Infusion 2.5 MICROgram(s)/kG/Min (4.43 mL/Hr) IV Continuous <Continuous>  norepinephrine Infusion 0.05 MICROgram(s)/kG/Min (5.54 mL/Hr) IV Continuous <Continuous>    ===================HEMATOLOGIC/ONC ===================  No acute issues   - monitor H&H/Plts     ===================== RENAL =========================  CRISSY likely in setting of BLANCA and low flow  - continue dobutamine  - SCr baseline appears ~1.4 (2019)  - Avoid nephrotoxins, renally dose meds  - Renal consult noted    ==================== GASTROINTESTINAL===================  Continue DASH diet     =======================    ENDOCRINE  =====================  No acute issues   - continue monitoring blood glucose closely for need to initiate sliding scale     ========================INFECTIOUS DISEASE================  Afebrile, WBC within normal limits  - Continue trending WBC and monitoring fever curve       Patient requires continuous monitoring with bedside rhythm monitoring, pulse ox monitoring, and intermittent blood gas analysis. Care plan discussed with ICU care team. Patient remained critical and at risk for life threatening decompensation.  Patient seen, examined and plan discussed with CCU team during rounds.     I have personally provided 35 minutes of critical care time excluding time spent on separate procedures.    By signing my name below, I, Kerri Gordon, attest that this documentation has been prepared under the direction and in the presence of SHELBY Barnett   Electronically signed: Timothy Ayers, 21 @ 21:04    I, Kylie Silvestre, personally performed the services described in this documentation. all medical record entries made by the timothy were at my direction and in my presence. I have reviewed the chart and agree that the record reflects my personal performance and is accurate and complete  Electronically signed: SHELBY Barnett

## 2021-07-14 NOTE — PROCEDURE NOTE - NSPROCDETAILS_GEN_ALL_CORE
guidewire recovered/lumen(s) aspirated and flushed/sterile dressing applied/sterile technique, catheter placed/ultrasound guidance with use of sterile gel and probe cove
location identified, draped/prepped, sterile technique used, needle inserted/introduced/positive blood return obtained via catheter/connected to a pressurized flush line/sutured in place/hemostasis with direct pressure, dressing applied/Seldinger technique/all materials/supplies accounted for at end of procedure
location identified, draped/prepped, sterile technique used, needle inserted/introduced/positive blood return obtained via catheter/connected to a pressurized flush line/sutured in place/Seldinger technique/all materials/supplies accounted for at end of procedure
guidewire recovered/lumen(s) aspirated and flushed/sterile dressing applied/sterile technique, catheter placed/ultrasound guidance with use of sterile gel and probe cove

## 2021-07-14 NOTE — PROGRESS NOTE ADULT - ATTENDING COMMENTS
The patient has been started on dobutamine/Levophed.  She has severe LV dysfunction along with severe/critical aortic valve stenosis with downtrending hemoglobin and increasing creatinine to 3.16.  Plan is for [place] to be placed later today.    Discussed with the CICU team consideration for potential intra-aortic balloon pump and retrograde aortic balloon valvuloplasty.  Indications and details for the procedure reviewed with the patient.  Benefits and risk were gone over.  Risks include but not limited to infection, bleeding, arrhythmia, TIA/stroke, Hemovac instability, vascular tree, need for urgent surgery and death.    All questions and concerns of the patient were addressed.    Findings and plan were discussed with CICU team.

## 2021-07-14 NOTE — PROCEDURE NOTE - ADDITIONAL PROCEDURE DETAILS
Under sterile conditions and with proper draping of the patient, a pulmonary artery catheter was floated through the introducer catheter in the left internal jugular vein. With the balloon inflated with 1.5ml of air, the PA catheter was advanced while monitoring the pressure tracing from the central venous system to the right ventricle and to the pulmonary artery. No wedge obtained, secured at 58cm. The balloon was deflated, PA pressure tracing was noted again. The position of the catheter was verified by CXR. The initial readings are:  CVP: 8 mmHg  RV S/D: 38/6  PA S/D: 39/16 mmHg    Complications: None

## 2021-07-14 NOTE — PROGRESS NOTE ADULT - SUBJECTIVE AND OBJECTIVE BOX
85 yo lady PMHx of HTN and paroxysmal Afib on coumadin who was transferred to [facility] ED from [place] ED where she presented with 2 week hx of intermittent atypical chest pain, with acute exacerbation of chest pain radiating to back for the last 2 days, also associated with single episode of syncope at home. Patient denies of any acute distress; no CP, SOB, lightheadedness, fevers, chills, or palpitations         T(C): 37.7 (07-14-21 @ 12:00), Max: 37.7 (07-14-21 @ 12:00)  HR: 96 (07-14-21 @ 13:00) (78 - 110)  BP: 78/50 (07-13-21 @ 16:30) (73/50 - 86/57)  RR: 21 (07-14-21 @ 11:00) (16 - 39)  SpO2: 99% (07-14-21 @ 12:30) (94% - 99%)    07-13-21 @ 07:01  -  07-14-21 @ 07:00  --------------------------------------------------------  IN: 2968.2 mL / OUT: 740 mL / NET: 2228.2 mL    07-14-21 @ 07:01  -  07-14-21 @ 13:37  --------------------------------------------------------  IN: 265.9 mL / OUT: 215 mL / NET: 50.9 mL        acetaminophen   Tablet .. 650 milliGRAM(s) Oral every 6 hours PRN  aspirin enteric coated 81 milliGRAM(s) Oral daily  atorvastatin 80 milliGRAM(s) Oral at bedtime  chlorhexidine 2% Cloths 1 Application(s) Topical daily  DOBUTamine Infusion 5 MICROgram(s)/kG/Min IV Continuous <Continuous>  melatonin 3 milliGRAM(s) Oral at bedtime  norepinephrine Infusion 0.05 MICROgram(s)/kG/Min IV Continuous <Continuous>  sodium bicarbonate 650 milliGRAM(s) Oral three times a day  sodium chloride 0.9% lock flush 10 milliLiter(s) IV Push every 1 hour PRN          PHYSICAL EXAM:    CGeneral: NAD, frail, appropriate affect  Cardiac: s1s2, IRR, systolic murmur  Pulmonary: CTA b/l, no w/r/r  Gastrointestinal: soft abdomen, nontender, nondistended, + bowel sounds throughout  Extremities: no edema, 1+ DP/PT pulses  Neuro: A&Ox3, nonfocal  EKG: Afib, NSIVCD       from: TTE with Doppler (w/Cont) (07.12.21 @ 07:44) >  LA:     3.4    2.0 - 4.0 cm  Ao:     2.9    2.0 - 3.8 cm  SEPTUM: 0.8    0.6 - 1.2 cm  PWT:    1.2    0.6 - 1.1 cm  LVIDd:  4.1    3.0 - 5.6cm  LVIDs:  3.5    1.8 - 4.0 cm  EF (Ryan Rule): 25 %Doppler Peak Velocity (m/sec):  MV=0.9 AoV=2.2  ------------------------------------------------------------------------  Observations:  Mitral Valve: Mitral annular calcification and calcified  mitral leaflets  Mild- Moderate mitral regurgitation. Peak  mitral valve gradient equals 3 mm Hg, mean transmitral  valve gradient equals 1 mm Hg.  Aortic Valve/Aorta: Calcified trileaflet aortic valve with  decreased opening. Peak transaortic valve gradient equals  19 mm Hg, mean transaortic valve gradient equals 12 mm Hg,  estimated aortic valve area equals 0.5 sqcm (by continuity  equation), aortic valve velocity time integral equals 42  cm, consistent with severe aortic stenosis. The gradients  are reduced  secondary to low flow state. 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 Mild aortic  regurgitation.  Aortic Root: 2.9 cm.  LVOT diameter: 2 cm.  Left Atrium: Mildly dilated left atrium.  LA volume index =  36 cc/m2.  Left Ventricle: Severe segmental left ventricular systolic  dysfunction. The function is best preserved at the basal  segments. The mid to distal segments are severely  hypokinetic. The apex is thin and akinetic. The LV is  foreshortened causing limited evaluation of the true apex.  No obvious thrombus seen however spontaneous echo contrast  was seen o the apex. Basal segments are thickened. Mid  ventricle 4.7cm  Unable to evaluate diastology.  Right Heart: Severe right atrial enlargement. The right  ventricle is not well visualized. Right ventricular  enlargement with normal right ventricular systolic  function. Tricuspid valve not well visualized.  Moderate-severe tricuspid regurgitation. Pulmonic valve not  well visualized.  Pericardium/Pleura: Normal pericardium with no pericardial  effusion.  Hemodynamic: Estimated right ventricular systolic pressure  equals 44 mm Hg, assuming right atrial pressure equals 10  mm Hg, consistent with mild pulmonary hypertension.  ------------------------------------------------------------------------  Conclusions:  1. Calcified trileaflet aortic valve with decreased  opening. Peak transaortic valve gradient equals 19 mm Hg,  mean transaortic valve gradient equals 12 mm Hg, estimated  aortic valve area equals 0.5 sqcm (by continuity equation),  aortic valve velocity time integral equals 42 cm,  consistent with severe aortic stenosis. The gradients are  reduced  secondary to low flow state. 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 Mild aortic  regurgitation.  2. Severe segmental left ventricular systolic dysfunction.  The function is best preserved at the basal segments. The  mid to distal segments are severely hypokinetic. The apex  is thin and akinetic. The LV is foreshortened causing  limited evaluation of the true apex. No obvious thrombus  seen however spontaneous echo contrast was seen o the apex.  3. Estimated right ventricular systolic pressure equals 44  mm Hg, assuming right atrial pressure equals 10 mm Hg,  consistent with mild pulmonary hypertension.  4. Tricuspid valve not well visualized. Moderate-severe  tricuspid regurgitation.    < end of copied text >      < from: Cardiac Cath Lab - Adult (07.12.21 @ 13:23) >  CORONARY VESSELS: The coronary circulation is right dominant.  LM:   --  LM: Angiography showed minor luminal irregularities with no flow  limiting lesions.  LAD:   --  LAD: Angiography showed minor luminal irregularities with no  flow limiting lesions.  CX:   --  Circumflex: Angiography showed minor luminal irregularities with  no flow limiting lesions.  RCA:   --  RCA: Angiography showed minor luminal irregularities with no  flow limiting lesions.    < end of copied text >

## 2021-07-14 NOTE — PROGRESS NOTE ADULT - SUBJECTIVE AND OBJECTIVE BOX
[facility]   [phone]  RENAL FOLLOW UP NOTE  --------------------------------------------------------------------------------  Chief Complaint:    24 hour events/subjective:    seen earlier   still with c/o abd/chest pains/aches and RENEE     PAST HISTORY  --------------------------------------------------------------------------------  No significant changes to PMH, PSH, FHx, SHx, unless otherwise noted    ALLERGIES & MEDICATIONS  --------------------------------------------------------------------------------  Allergies    adhesives (Rash)  No Known Drug Allergies    Intolerances      Standing Inpatient Medications  aspirin enteric coated 81 milliGRAM(s) Oral daily  atorvastatin 80 milliGRAM(s) Oral at bedtime  chlorhexidine 2% Cloths 1 Application(s) Topical daily  DOBUTamine Infusion 5 MICROgram(s)/kG/Min IV Continuous <Continuous>  melatonin 5 milliGRAM(s) Oral at bedtime  multivitamin 1 Tablet(s) Oral daily  norepinephrine Infusion 0.05 MICROgram(s)/kG/Min IV Continuous <Continuous>  sodium bicarbonate 650 milliGRAM(s) Oral three times a day    PRN Inpatient Medications  acetaminophen   Tablet .. 650 milliGRAM(s) Oral every 6 hours PRN  sodium chloride 0.9% lock flush 10 milliLiter(s) IV Push every 1 hour PRN      REVIEW OF SYSTEMS  --------------------------------------------------------------------------------    Gen: denies chills,  CVS:  +  chest pain/palpitations  Resp:  +  SOB/Cough  GI: Denies + nausea no vomiting Abd pain +   : Denies dysuria      VITALS/PHYSICAL EXAM  --------------------------------------------------------------------------------  T(C): 37.8 (07-14-21 @ 19:00), Max: 37.8 (07-14-21 @ 19:00)  HR: 100 (07-14-21 @ 19:00) (80 - 110)  BP: --  RR: 20 (07-14-21 @ 19:00) (16 - 33)  SpO2: 96% (07-14-21 @ 19:00) (94% - 100%)  Wt(kg): --        07-13-21 @ 07:01  -  07-14-21 @ 07:00  --------------------------------------------------------  IN: 2968.2 mL / OUT: 740 mL / NET: 2228.2 mL    07-14-21 @ 07:01  -  07-14-21 @ 19:42  --------------------------------------------------------  IN: 633.3 mL / OUT: 395 mL / NET: 238.3 mL      Physical Exam:  	    Gen: pale appearing   	 - JVD  	Pulm: decrease bs  no rales or ronchi or wheezing  	CV: RRR, S1S2; no rub III/VI M   	Abd: +BS, soft, nontender/nondistended  	: No suprapubic tenderness  	UE: Warm, no cyanosis  no clubbing,  no edema  	LE: Warm, no cyanosis  no clubbing, no edema  	Neuro: alert and oriented. speech coherent       LABS/STUDIES  --------------------------------------------------------------------------------              10.5   10.51 >-----------<  162      [07-14-21 @ 10:15]              31.2     136  |  106  |  50  ----------------------------<  159      [07-14-21 @ 10:15]  4.3   |  18  |  3.16        Ca     9.0     [07-14-21 @ 10:15]      Mg     1.9     [07-14-21 @ 01:22]      Phos  2.4     [07-14-21 @ 01:22]    TPro  5.0  /  Alb  3.0  /  TBili  1.6  /  DBili  x   /  AST  84  /  ALT  49  /  AlkPhos  61  [07-14-21 @ 10:15]    PT/INR: PT 13.3 , INR 1.11       [07-14-21 @ 17:28]  PTT: 40.1       [07-14-21 @ 01:22]          [07-14-21 @ 01:22]    Creatinine Trend:  SCr 3.16 [07-14 @ 10:15]  SCr 3.27 [07-14 @ 01:22]  SCr 3.01 [07-13 @ 18:04]  SCr 2.19 [07-13 @ 00:59]  SCr 1.88 [07-12 @ 12:42]                  PTH -- (Ca 9.2)      [07-13-21 @ 23:49]   193  Vitamin D (25OH) 47.6      [07-13-21 @ 23:49]  TSH 2.32      [07-12-21 @ 08:24]  Lipid: chol 105, TG 89, HDL 51, LDL --      [07-12-21 @ 08:26]

## 2021-07-14 NOTE — DIETITIAN NUTRITION RISK NOTIFICATION - ADDITIONAL COMMENTS/DIETITIAN RECOMMENDATIONS
1. Recommend Low sodium, mechanical soft - pt requesting diet texture downgrade.   2. Recommend Ensure Enlive x2 daily in efforts to optimize PO intake.   3. Consider addition of Multivitamin supplementation pending no existing contraindications

## 2021-07-14 NOTE — PROGRESS NOTE ADULT - SUBJECTIVE AND OBJECTIVE BOX
ANDRE DE LUNA  MRN-[id number]  Patient is a 86y old  Female who presents with a chief complaint of late presentation MI (2021 21:04)    HPI:  87 yo lady PMHx of HTN and paroxysmal Afib on coumadin who was transferred to [facility] ED from [place] ED where she presented with 2 week hx of intermittent atypical chest pain, with acute exacerbation of chest pain radiating to back for the last 2 days, also associated with single episode of syncope at home today.     In the OSH ED, vitals - HR 80-90s Afib, -130s, RR 20, and SpO2 98% on 2L NC. Unremarkable exam reported. Labs c/w CRISSY on CKD (Cr 1.9 compared to 1.4 in 2019), with trop >40, pBNP 8543, and CKMB 94.5. CT chest angio (90cc contrast) without evidence of PE but inconclusive study on aortic dissection. Was transferred to [facility] ED for further management.    At the [facility] ED, vitals - HR 90-100s Afib, /60 at the bedside, RR 20, and SpO2 95% on room air. On physical exam, with JVD ~8cm, without pertinent cardiac or respiratory exam findings, without associated LE edema. Labs c/w CRISSY, hsTrop >10,000, lactate 2.5. EKG with Afib with RBBB, with Q waves on lateral leads. Bedside TTE with reduced EF ~15-20%. Repeat CT chest angio to r/o aortic dissection was negative for significant dissection.    Admitted to CICU for close monitoring, chest pain management on nitro gtt, pending [facility] in AM      (2021 00:11)      24 HOUR EVENTS:    REVIEW OF SYSTEMS:    CONSTITUTIONAL: No weakness, fevers or chills  EYES/ENT: No visual changes;  No vertigo or throat pain   NECK: No pain or stiffness  RESPIRATORY: No cough, wheezing, hemoptysis; No shortness of breath  CARDIOVASCULAR: No chest pain or palpitations  GASTROINTESTINAL: No abdominal or epigastric pain. No nausea, vomiting, or hematemesis; No diarrhea or constipation. No melena or hematochezia.  GENITOURINARY: No dysuria, frequency or hematuria  NEUROLOGICAL: No numbness or weakness  SKIN: No itching, rashes      ICU Vital Signs Last 24 Hrs  T(C): 36.8 (2021 04:00), Max: 37.1 (2021 16:00)  T(F): 98.2 (2021 04:00), Max: 98.7 (2021 16:00)  HR: 90 (2021 06:15) (76 - 110)  BP: 78/50 (2021 16:30) (67/46 - 90/64)  BP(mean): 59 (2021 16:30) (55 - 73)  ABP: 104/54 (2021 06:15) (74/32 - 108/62)  ABP(mean): 74 (2021 06:15) (48 - 80)  RR: 29 (2021 06:15) (19 - 39)  SpO2: 97% (2021 06:15) (90% - 100%)      CVP(mm Hg): --  CO: --  CI: --  PA: --  PA(mean): --  PA(direct): --  PCWP: --  LA: --  RA: --  SVR: --  SVRI: --  PVR: --  PVRI: --  I&O's Summary    2021 07:01  -  2021 07:00  --------------------------------------------------------  IN: 2968.2 mL / OUT: 740 mL / NET: 2228.2 mL    2021 07:01  -  2021 08:52  --------------------------------------------------------  IN: 0 mL / OUT: 100 mL / NET: -100 mL        CAPILLARY BLOOD GLUCOSE    CAPILLARY BLOOD GLUCOSE          PHYSICAL EXAM:  GENERAL: No acute distress, well-developed  HEAD:  Atraumatic, Normocephalic  EYES: EOMI, PERRLA, conjunctiva and sclera clear  NECK: Supple, no lymphadenopathy, no JVD  CHEST/LUNG: CTAB; No wheezes, rales, or rhonchi  HEART: Regular rate and rhythm. Normal S1/S2. No murmurs, rubs, or gallops  ABDOMEN: Soft, non-tender, non-distended; normal bowel sounds, no organomegaly  EXTREMITIES:  2+ peripheral pulses b/l, No clubbing, cyanosis, or edema  NEUROLOGY: A&O x 3, no focal deficits  SKIN: No rashes or lesions    ============================I/O===========================   I&O's Detail    2021 07:  -  2021 07:00  --------------------------------------------------------  IN:    DOBUTamine: 52.8 mL    IV PiggyBack: 563 mL    Norepinephrine: 172.4 mL    Oral Fluid: 880 mL    sodium chloride 0.9%: 550 mL    Sodium Chloride 0.9% Bolus: 750 mL  Total IN: 2968.2 mL    OUT:    Indwelling Catheter - Urethral (mL): 490 mL    Voided (mL): 250 mL  Total OUT: 740 mL    Total NET: 2228.2 mL      2021 07:01  -  2021 08:52  --------------------------------------------------------  IN:  Total IN: 0 mL    OUT:    Indwelling Catheter - Urethral (mL): 100 mL  Total OUT: 100 mL    Total NET: -100 mL        ============================ LABS =========================                        10.1   11.69 )-----------( 150      ( 2021 01:22 )             30.4         137  |  106  |  51<H>  ----------------------------<  133<H>  4.1   |  19<L>  |  3.27<H>    Ca    8.8      2021 01:22  Phos  2.4       Mg     1.9         TPro  5.0<L>  /  Alb  2.7<L>  /  TBili  1.5<H>  /  DBili  x   /  AST  101<H>  /  ALT  48<H>  /  AlkPhos  57      Troponin T, High Sensitivity Result: >24639 ng/L (21 @ :22)  Troponin T, High Sensitivity Result: >76776 ng/L (21 @ 18:04)  Troponin T, High Sensitivity Result: 8424 ng/L (21 @ 12:42)  Troponin T, High Sensitivity Result: 9486 ng/L (21 @ 04:27)  Troponin T, High Sensitivity Result: >52065 ng/L (21 @ 21:34)    CKMB Units: 22.2 ng/mL (21 @ :22)  CKMB Units: 26.6 ng/mL (21 @ 18:04)  CKMB Units: 73.5 ng/mL (21 @ 12:42)  CKMB Units: 93.1 ng/mL (21 @ 04:27)  CKMB Units: 98.0 ng/mL (21 @ 21:34)  CKMB Units: 94.5 ng/mL (21 @ 19:19)    Creatine Kinase, Serum: 299 U/L (21 @ :22)  Creatine Kinase, Serum: 359 U/L (21 @ 18:04)  Creatine Kinase, Serum: 745 U/L (21 @ 12:42)  Creatine Kinase, Serum: 926 U/L (21 @ 04:27)  Creatine Kinase, Serum: 1031 U/L (21 @ 21:34)  Creatine Kinase, Serum: 885 U/L (21 @ 19:19)    CPK Mass Assay %: 7.4 % (21 @ 01:22)  CPK Mass Assay %: 7.4 % (21 @ 18:04)  CPK Mass Assay %: 9.9 % (21 @ 12:42)  CPK Mass Assay %: 10.1 % (21 @ 04:27)  CPK Mass Assay %: 9.5 % (21 @ 21:34)  CPK Mass Assay %: 10.7 % (21 @ 19:19)        LIVER FUNCTIONS - ( 2021 01:22 )  Alb: 2.7 g/dL / Pro: 5.0 g/dL / ALK PHOS: 57 U/L / ALT: 48 U/L / AST: 101 U/L / GGT: x           PT/INR - ( 2021 01:22 )   PT: 40.0 sec;   INR: 3.54 ratio         PTT - ( 2021 01:22 )  PTT:40.1 sec    Blood Gas Venous - Lactate: 1.2 mmoL/L (21 @ 03:02)  Blood Gas Venous - Lactate: 2.0 mmoL/L (21 @ 20:41)  Lactate, Blood: 1.0 mmol/L (21 @ 18:04)  Lactate, Blood: 1.9 mmol/L (21 @ 04:27)  Blood Gas Venous - Lactate: 2.5 mmoL/L (21 @ 21:34)      ======================Micro/Rad/Cardio=================  Telemetry: Reviewed   EKG: Reviewed  CXR: Reviewed  Culture: Reviewed   Echo:   Cath: Cardiac Cath Lab - Adult:   [facility]  Department of Cardiology  [address] [place], [place] [address] [phone]  Cath Lab Report -- Comprehensive Report  Patient: ANDRE DE LUNA  Study date: 2021  Account number: [id number]  MR number:[id number]  : 1935  Gender: Female  Race: W  Case Physician(s):  Jack Chatterjee M.D.  Fellow:  HI Mckenna M.D.  Referring Physician:  Arsenio Hebert M.D.  INDICATIONS: Initial NSTEMI.  HISTORY: Aortic valve: history of severe stenosis. The patient has  hypertension.  PROCEDURE:  --  Left coronary angiography.  --  Right coronary angiography.  --  Sonosite - Diagnostic.  TECHNIQUE: The risks and alternatives of the procedures and conscious  sedation were explained to the patient and informed consent was obtained.  Cardiac catheterization performed electively.  Local anesthetic given. Right femoral artery access. Left coronary artery  angiography. The vessel was injected utilizing a catheter. Right coronary  artery angiography. The vessel was injected utilizing a catheter. Sonosite  - Diagnostic. RADIATION EXPOSURE: 1.4 min.  CONTRAST GIVEN: Omnipaque 19 ml.  MEDICATIONS GIVEN: Midazolam, 0.5 mg, IV. Fentanyl, 25 mcg, IV.  CORONARY VESSELS: The coronary circulation is right dominant.  LM:   --  LM: Angiography showed minor luminal irregularities with no flow  limiting lesions.  LAD:   --  LAD: Angiography showed minor luminal irregularities with no  flow limiting lesions.  CX:   --  Circumflex: Angiography showed minor luminal irregularities with  no flow limiting lesions.  RCA:   --  RCA: Angiography showed minor luminal irregularities with no  flow limiting lesions.  COMPLICATIONS: There were no complications.  DIAGNOSTIC RECOMMENDATIONS: The patient should continue with the present  medications. Evalaution for TAVR  Prepared and signed by  Jack Chatterjee M.D.  Signed 2021 15:18:45  HEMODYNAMIC TABLES  Outputs:  Baseline  Outputs:  -- CALCULATIONS[age]  Outputs:  -- CALCULATIONS: Body Surface Area: 1.66  Outputs:  -- CALCULATIONS: Height in cm: 167.00  Outputs:  -- CALCULATIONS: Sex: Female  Outputs:  -- CALCULATIONS: Weight in k.00  Outputs:  -- OUTPUTS: O2 consumption: 207.66  Outputs:  -- OUTPUTS: Vo2 Indexed: 125.00 (21 @ 13:23)    ======================================================  PAST MEDICAL & SURGICAL HISTORY:  Afib    Hypertension    Hyperparathyroidism    Osteoporosis    Dyslipidemia    Macular degeneration    Temporal arteritis    Spinal stenosis    Osteoarthritis    Sternal fracture  2010    History of cholecystectomy    H/O nasal septoplasty    History of lumbar laminectomy  with microdiskectomy    H/O parathyroidectomy    H/O total knee replacement, left    History of temporal arteritis  b/l surgical repair    H/O bilateral cataract extraction       ANDRE DE LUNA  MRN-[id number]  Patient is a 86y old  Female who presents with a chief complaint of late presentation MI (2021 21:04)    HPI:  87 yo lady PMHx of HTN and paroxysmal Afib on coumadin who was transferred to [facility] ED from [place] ED where she presented with 2 week hx of intermittent atypical chest pain, with acute exacerbation of chest pain radiating to back for the last 2 days, also associated with single episode of syncope at home today.     In the OSH ED, vitals - HR 80-90s Afib, -130s, RR 20, and SpO2 98% on 2L NC. Unremarkable exam reported. Labs c/w CRISSY on CKD (Cr 1.9 compared to 1.4 in 2019), with trop >40, pBNP 8543, and CKMB 94.5. CT chest angio (90cc contrast) without evidence of PE but inconclusive study on aortic dissection. Was transferred to [facility] ED for further management.    At the [facility] ED, vitals - HR 90-100s Afib, /60 at the bedside, RR 20, and SpO2 95% on room air. On physical exam, with JVD ~8cm, without pertinent cardiac or respiratory exam findings, without associated LE edema. Labs c/w CRISSY, hsTrop >10,000, lactate 2.5. EKG with Afib with RBBB, with Q waves on lateral leads. Bedside TTE with reduced EF ~15-20%. Repeat CT chest angio to r/o aortic dissection was negative for significant dissection.    Admitted to CICU for close monitoring, chest pain management on nitro gtt, pending [facility] in AM      (2021 00:11)      24 HOUR EVENTS: Patent oliguric, hypotensive--> Received 1500ml Crystalloid, Started on Levophed and Dobutamine 2.5mcg/kg/min. Left Axillary [place] and RIJ TLC placed. Patient demonstrated worsening CRISSY, Oliguria    REVIEW OF SYSTEMS:    CONSTITUTIONAL: No weakness, fevers or chills  EYES/ENT: No visual changes;  No vertigo or throat pain   NECK: No pain or stiffness  RESPIRATORY: No cough, wheezing, hemoptysis; No shortness of breath  CARDIOVASCULAR: No chest pain or palpitations  GASTROINTESTINAL: No abdominal or epigastric pain. No nausea, vomiting, or hematemesis; No diarrhea or constipation. No melena or hematochezia.  GENITOURINARY: No dysuria, frequency or hematuria  NEUROLOGICAL: No numbness or weakness  SKIN: No itching, rashes      ICU Vital Signs Last 24 Hrs  T(C): 36.8 (2021 04:00), Max: 37.1 (2021 16:00)  T(F): 98.2 (2021 04:00), Max: 98.7 (2021 16:00)  HR: 90 (2021 06:15) (76 - 110)  BP: 78/50 (2021 16:30) (67/46 - 90/64)  BP(mean): 59 (2021 16:30) (55 - 73)  ABP: 104/54 (2021 06:15) (74/32 - 108/62)  ABP(mean): 74 (2021 06:15) (48 - 80)  RR: 29 (2021 06:15) (19 - 39)  SpO2: 97% (2021 06:15) (90% - 100%)      2021 07:01  -  2021 07:00  --------------------------------------------------------  IN: 2968.2 mL / OUT: 740 mL / NET: 2228.2 mL    2021 07:01  -  2021 08:52  --------------------------------------------------------  IN: 0 mL / OUT: 100 mL / NET: -100 mL        CAPILLARY BLOOD GLUCOSE    CAPILLARY BLOOD GLUCOSE          PHYSICAL EXAM:  GENERAL: No acute distress, well-developed  HEAD:  Atraumatic, Normocephalic  EYES: PERRLA, conjunctiva and sclera clear  NECK: Supple, no lymphadenopathy, no JVD  CHEST/LUNG: CTAB; No wheezes, rales, or rhonchi  HEART: Regular rate and irregular rhythm Normal S1/S2. No murmurs, rubs, or gallops  ABDOMEN: Soft, non-tender, non-distended; no organomegaly  EXTREMITIES:  2+ peripheral pulses radial/DP/PT b/l, No clubbing, cyanosis, or edema  NEUROLOGY: A&O x4, no focal deficits  SKIN: No rashes or lesions. Left AC ecchymosis     ============================I/O===========================   I&O's Detail    2021 07:01  -  2021 07:00  --------------------------------------------------------  IN:    DOBUTamine: 52.8 mL    IV PiggyBack: 563 mL    Norepinephrine: 172.4 mL    Oral Fluid: 880 mL    sodium chloride 0.9%: 550 mL    Sodium Chloride 0.9% Bolus: 750 mL  Total IN: 2968.2 mL    OUT:    Indwelling Catheter - Urethral (mL): 490 mL    Voided (mL): 250 mL  Total OUT: 740 mL    Total NET: 2228.2 mL      2021 07:01  -  2021 08:52  --------------------------------------------------------  IN:  Total IN: 0 mL    OUT:    Indwelling Catheter - Urethral (mL): 100 mL  Total OUT: 100 mL    Total NET: -100 mL        ============================ LABS =========================                        10.1   11.69 )-----------( 150      ( 2021 01:22 )             30.4     07-14    137  |  106  |  51<H>  ----------------------------<  133<H>  4.1   |  19<L>  |  3.27<H>    Ca    8.8      2021 01:22  Phos  2.4       Mg     1.9         TPro  5.0<L>  /  Alb  2.7<L>  /  TBili  1.5<H>  /  DBili  x   /  AST  101<H>  /  ALT  48<H>  /  AlkPhos  57      Troponin T, High Sensitivity Result: >80110 ng/L (21 @ :22)  Troponin T, High Sensitivity Result: >13225 ng/L (21 @ 18:04)  Troponin T, High Sensitivity Result: 8424 ng/L (21 @ 12:42)  Troponin T, High Sensitivity Result: 9486 ng/L (21 @ 04:27)  Troponin T, High Sensitivity Result: >26415 ng/L (21 @ 21:34)    CKMB Units: 22.2 ng/mL (21 @ :22)  CKMB Units: 26.6 ng/mL (21 @ 18:04)  CKMB Units: 73.5 ng/mL (21 @ 12:42)  CKMB Units: 93.1 ng/mL (21 @ 04:27)  CKMB Units: 98.0 ng/mL (21 @ 21:34)  CKMB Units: 94.5 ng/mL (21 @ 19:19)    Creatine Kinase, Serum: 299 U/L (21 @ :22)  Creatine Kinase, Serum: 359 U/L (21 @ 18:04)  Creatine Kinase, Serum: 745 U/L (21 @ 12:42)  Creatine Kinase, Serum: 926 U/L (21 @ 04:27)  Creatine Kinase, Serum: 1031 U/L (21 @ 21:34)  Creatine Kinase, Serum: 885 U/L (21 @ 19:19)    CPK Mass Assay %: 7.4 % (21 @ 01:22)  CPK Mass Assay %: 7.4 % (21 @ 18:04)  CPK Mass Assay %: 9.9 % (21 @ 12:42)  CPK Mass Assay %: 10.1 % (21 @ 04:27)  CPK Mass Assay %: 9.5 % (21 @ 21:34)  CPK Mass Assay %: 10.7 % (21 @ 19:19)        LIVER FUNCTIONS - ( 2021 01:22 )  Alb: 2.7 g/dL / Pro: 5.0 g/dL / ALK PHOS: 57 U/L / ALT: 48 U/L / AST: 101 U/L / GGT: x           PT/INR - ( 2021 01:22 )   PT: 40.0 sec;   INR: 3.54 ratio         PTT - ( 2021 01:22 )  PTT:40.1 sec    Blood Gas Venous - Lactate: 1.2 mmoL/L (21 @ 03:02)  Blood Gas Venous - Lactate: 2.0 mmoL/L (21 @ 20:41)  Lactate, Blood: 1.0 mmol/L (21 @ 18:04)  Lactate, Blood: 1.9 mmol/L (21 @ 04:27)  Blood Gas Venous - Lactate: 2.5 mmoL/L (21 @ 21:34)      ======================Micro/Rad/Cardio=================  Telemetry: Reviewed   EKG: Reviewed  CXR: Reviewed  Culture: Reviewed   Echo:   Cath: Cardiac Cath Lab - Adult:   [facility]  Department of Cardiology  [address] [place] [address]  [phone]  Cath Lab Report -- Comprehensive Report  Patient: ANDRE DE LUNA  Study date: 2021  Account number: [id number]  MR number:[id number]  : 1935  Gender: Female  Race: W  Case Physician(s):  Jack Chatterjee M.D.  Fellow:  Vinay Ron M.D.  Vimal Mayen M.D.  Referring Physician:  Arsenio Hebert M.D.  INDICATIONS: Initial NSTEMI.  HISTORY: Aortic valve: history of severe stenosis. The patient has  hypertension.  PROCEDURE:  --  Left coronary angiography.  --  Right coronary angiography.  --  Sonosite - Diagnostic.  TECHNIQUE: The risks and alternatives of the procedures and conscious  sedation were explained to the patient and informed consent was obtained.  Cardiac catheterization performed electively.  Local anesthetic given. Right femoral artery access. Left coronary artery  angiography. The vessel was injected utilizing a catheter. Right coronary  artery angiography. The vessel was injected utilizing a catheter. Sonosite  - Diagnostic. RADIATION EXPOSURE: 1.4 min.  CONTRAST GIVEN: Omnipaque 19 ml.  MEDICATIONS GIVEN: Midazolam, 0.5 mg, IV. Fentanyl, 25 mcg, IV.  CORONARY VESSELS: The coronary circulation is right dominant.  LM:   --  LM: Angiography showed minor luminal irregularities with no flow  limiting lesions.  LAD:   --  LAD: Angiography showed minor luminal irregularities with no  flow limiting lesions.  CX:   --  Circumflex: Angiography showed minor luminal irregularities with  no flow limiting lesions.  RCA:   --  RCA: Angiography showed minor luminal irregularities with no  flow limiting lesions.  COMPLICATIONS: There were no complications.  DIAGNOSTIC RECOMMENDATIONS: The patient should continue with the present  medications. Evalaution for TAVR  Prepared and signed by  Jack Chatterjee M.D.  Signed 2021 15:18:45  HEMODYNAMIC TABLES  Outputs:  Baseline  Outputs:  -- CALCULATIONS[age]  Outputs:  -- CALCULATIONS: Body Surface Area: 1.66  Outputs:  -- CALCULATIONS: Height in cm: 167.00  Outputs:  -- CALCULATIONS: Sex: Female  Outputs:  -- CALCULATIONS: Weight in k.00  Outputs:  -- OUTPUTS: O2 consumption: 207.66  Outputs:  -- OUTPUTS: Vo2 Indexed: 125.00 (21 @ 13:23)    ======================================================  PAST MEDICAL & SURGICAL HISTORY:  Afib    Hypertension    Hyperparathyroidism    Osteoporosis    Dyslipidemia    Macular degeneration    Temporal arteritis    Spinal stenosis    Osteoarthritis    Sternal fracture      History of cholecystectomy    H/O nasal septoplasty    History of lumbar laminectomy  with microdiskectomy    H/O parathyroidectomy    H/O total knee replacement, left    History of temporal arteritis  b/l surgical repair    H/O bilateral cataract extraction

## 2021-07-14 NOTE — PROCEDURE NOTE - NSINDICATIONS_GEN_A_CORE
arterial puncture to obtain ABG's/blood sampling/critical patient/monitoring purposes
hemodynamic monitoring/venous access
critical patient/monitoring purposes
critical illness/venous access

## 2021-07-14 NOTE — DIETITIAN INITIAL EVALUATION ADULT. - ORAL INTAKE PTA/DIET HISTORY
Admits to usually good appetite/PO intake PTA, has been consuming more lean cuisine meals as they are easy to prepare. Was living at home alone but notes a few weeks ago started moving into assisted living facility. Admits over the last month or so has been skipping meals more frequently; preoccupied with moving. NKFA. Micronutrient supplementation: Vitamin D3

## 2021-07-14 NOTE — CONSULT NOTE ADULT - ATTENDING COMMENTS
The signs/symptoms that led to the patient's current hospitalization along with her past medical/surgical/family history and social history was gone over.  Physical examination agree with above.    The patient has critical aortic valve stenosis and had a syncopal episode as an outpatient.  She has been experiencing increasing episodes of shortness of breath, dyspnea upon exertion and atypical chest discomfort.  Found to have severe/critical aortic valve stenosis with reduced ejection fraction and nonobstructive coronary artery disease.    There is concern the patient is going into cardiogenic shock secondary to her severe/critical aortic valve stenosis.  It is recommended that an intra-aortic balloon pump/[place] and initiation of pressors/inotropes be performed.  Once the patient is clinically stabilized consideration could be made for further work-up for TAVR.    If the patient needs a retrograde aortic balloon valvuloplasty to help temporize the patient may be performed in the interim.    All questions and concerns of the patient were addressed.    EKG, laboratory studies and imaging sets were personally reviewed.    Findings and plan were discussed with CICU team/Dr. Steward.
Brieflyk, 85 yo F w/ h/o HTN and paroxysmal Afib on coumadin who presented with chest pain and syncope at home to [place] on , found to have trop I>40; underwent CTA negative for PE and transferred to NS. Initially normotensive and labs revealed hsTrop >10,000, lactate 2.5. EKG with Afib with RBBB, with Q waves on lateral leads. Bedside TTE with reduced EF ~15-20%. Repeat CT chest angio to r/o aortic dissection was negative for significant dissection. Underwent LHC  which showed non-obstructive coronaries. TTE with severe LV dysfunction (segmental with basal contractility and apical akinesis) with possibly severe AS (mean gradient 30, TINY 0.5). Became hypotensive on  (notably received metoprolol day prior). Started on /levophed and received IVF. Currently reports diffuse chest pain/abdom pain. On exam, frail appearing, NAD, JVP approx 6 cm, grade II/VI systolic murmur, nontender abdomen, no pedal edema. Labs reviewed - Hb 10 (downtrendign), BUN/Cr 50/3.16 (maxwell 1.8), lactate negative, central sat 56% (estimated CI 2.2). Unclear etiology of severe LV dysfunction with elevated troponins in absence of non-obstructive coronaries - possibly Takotsubo's vs. embolic event which recanalized  - c/w ; wean levophed as tolerated for SBP 90  - goal CVP 6-8; consider giving albumin as appears hypovolemic  - [place] to be placed  - prognosis guarded

## 2021-07-14 NOTE — DIETITIAN INITIAL EVALUATION ADULT. - PERTINENT MEDS FT
MEDICATIONS  (STANDING):  atorvastatin  DOBUTamine Infusion  norepinephrine Infusion  sodium bicarbonate

## 2021-07-14 NOTE — CONSULT NOTE ADULT - SUBJECTIVE AND OBJECTIVE BOX
Structural Heart Team    HPI:  85 yo lady PMHx of HTN and paroxysmal Afib on coumadin who was transferred to [facility] ED from [place] ED where she presented with 2 week hx of intermittent atypical chest pain, with acute exacerbation of chest pain radiating to back for the last 2 days, also associated with single episode of syncope at home.      Ms Sharpe is an 87y/o female with PMHx of HTN and Afib (on Coumadin) admitted with syncope and chest pain.  She describes a few week history of exertional chest pain and pressure radiating to the back, culminating in a syncopal episode (she states she remembers "going down and waking up on the floor").  She is independent and lives alone, though she is in the process of moving to a care home community.  She says that she has not been active in the past year due to COVID but has noticed that she has "slowed down" considerably in the past year.  She is very limited due to her worsening benavides and she reports that she gets leg swelling, which she describes as "elephant legs".  Found on echo to have severe AS and Structural Heart Team was consulted to evaluate for TAVR.        Allergies    adhesives (Rash)  No Known Drug Allergies    Intolerances      PAST MEDICAL & SURGICAL HISTORY:  Afib    Hypertension    Hyperparathyroidism    Osteoporosis    Dyslipidemia    Macular degeneration    Temporal arteritis    Spinal stenosis    Osteoarthritis    Sternal fracture  2010    History of cholecystectomy    H/O nasal septoplasty    History of lumbar laminectomy  with microdiskectomy    H/O parathyroidectomy    H/O total knee replacement, left    History of temporal arteritis  b/l surgical repair    H/O bilateral cataract extraction      MEDICATIONS  (STANDING):  aspirin enteric coated 81 milliGRAM(s) Oral daily  atorvastatin 80 milliGRAM(s) Oral at bedtime  chlorhexidine 2% Cloths 1 Application(s) Topical daily  sodium bicarbonate 650 milliGRAM(s) Oral three times a day      REVIEW OF SYSTEMS:    CONSTITUTIONAL: No weakness, fevers or chills  EYES/ENT: No visual changes;  No vertigo or throat pain   NECK: No pain or stiffness  RESPIRATORY: No cough, wheezing, hemoptysis; No shortness of breath  CARDIOVASCULAR: No chest pain or palpitations  GASTROINTESTINAL: No abdominal or epigastric pain. No nausea, vomiting, or hematemesis; No diarrhea or constipation. No melena or hematochezia.  GENITOURINARY: No dysuria, frequency or hematuria  NEUROLOGICAL: No numbness or weakness  SKIN: No itching, rashes      Vital Signs Last 24 Hrs  T(C): 36.7 (13 Jul 2021 12:00), Max: 36.8 (13 Jul 2021 04:00)  T(F): 98 (13 Jul 2021 12:00), Max: 98.2 (13 Jul 2021 04:00)  HR: 88 (13 Jul 2021 13:30) (74 - 110)  BP: 69/59 (13 Jul 2021 13:00) (67/46 - 90/64)  BP(mean): 64 (13 Jul 2021 13:00) (55 - 73)  RR: 32 (13 Jul 2021 13:30) (19 - 37)  SpO2: 90% (13 Jul 2021 12:30) (89% - 100%)                          12.0   12.59 )-----------( 170      ( 13 Jul 2021 00:59 )             35.7   07-13    133<L>  |  104  |  42<H>  ----------------------------<  128<H>  4.8   |  16<L>  |  2.19<H>    Ca    10.0      13 Jul 2021 00:59  Phos  3.0     07-13  Mg     2.0     07-13    TPro  5.6<L>  /  Alb  3.2<L>  /  TBili  1.8<H>  /  DBili  x   /  AST  161<H>  /  ALT  51<H>  /  AlkPhos  62  07-13    PT/INR - ( 13 Jul 2021 00:59 )   PT: 42.9 sec;   INR: 3.81 ratio         PTT - ( 13 Jul 2021 00:59 )  PTT:38.1 sec    I&O's Summary    12 Jul 2021 07:01  -  13 Jul 2021 07:00  --------------------------------------------------------  IN: 1340 mL / OUT: 700 mL / NET: 640 mL    13 Jul 2021 07:01  -  13 Jul 2021 15:00  --------------------------------------------------------  IN: 800 mL / OUT: 0 mL / NET: 800 mL          Physical Exam  General: NAD, frail, appropriate affect  Cardiac: s1s2, IRR, III/VI systolic murmur  Pulmonary: CTA b/l, no w/r/r  Gastrointestinal: soft abdomen, nontender, nondistended, + bowel sounds throughout  Extremities: no edema, 1+ DP/PT pulses  Neuro: A&Ox3, nonfocal  EKG: Afib, NSIVCD      < from: TTE with Doppler (w/Cont) (07.12.21 @ 07:44) >  LA:     3.4    2.0 - 4.0 cm  Ao:     2.9    2.0 - 3.8 cm  SEPTUM: 0.8    0.6 - 1.2 cm  PWT:    1.2    0.6 - 1.1 cm  LVIDd:  4.1    3.0 - 5.6cm  LVIDs:  3.5    1.8 - 4.0 cm  EF (Ryan Rule): 25 %Doppler Peak Velocity (m/sec):  MV=0.9 AoV=2.2  ------------------------------------------------------------------------  Observations:  Mitral Valve: Mitral annular calcification and calcified  mitral leaflets  Mild- Moderate mitral regurgitation. Peak  mitral valve gradient equals 3 mm Hg, mean transmitral  valve gradient equals 1 mm Hg.  Aortic Valve/Aorta: Calcified trileaflet aortic valve with  decreased opening. Peak transaortic valve gradient equals  19 mm Hg, mean transaortic valve gradient equals 12 mm Hg,  estimated aortic valve area equals 0.5 sqcm (by continuity  equation), aortic valve velocity time integral equals 42  cm, consistent with severe aortic stenosis. The gradients  are reduced  secondary to low flow state. 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 Mild aortic  regurgitation.  Aortic Root: 2.9 cm.  LVOT diameter: 2 cm.  Left Atrium: Mildly dilated left atrium.  LA volume index =  36 cc/m2.  Left Ventricle: Severe segmental left ventricular systolic  dysfunction. The function is best preserved at the basal  segments. The mid to distal segments are severely  hypokinetic. The apex is thin and akinetic. The LV is  foreshortened causing limited evaluation of the true apex.  No obvious thrombus seen however spontaneous echo contrast  was seen o the apex. Basal segments are thickened. Mid  ventricle 4.7cm  Unable to evaluate diastology.  Right Heart: Severe right atrial enlargement. The right  ventricle is not well visualized. Right ventricular  enlargement with normal right ventricular systolic  function. Tricuspid valve not well visualized.  Moderate-severe tricuspid regurgitation. Pulmonic valve not  well visualized.  Pericardium/Pleura: Normal pericardium with no pericardial  effusion.  Hemodynamic: Estimated right ventricular systolic pressure  equals 44 mm Hg, assuming right atrial pressure equals 10  mm Hg, consistent with mild pulmonary hypertension.  ------------------------------------------------------------------------  Conclusions:  1. Calcified trileaflet aortic valve with decreased  opening. Peak transaortic valve gradient equals 19 mm Hg,  mean transaortic valve gradient equals 12 mm Hg, estimated  aortic valve area equals 0.5 sqcm (by continuity equation),  aortic valve velocity time integral equals 42 cm,  consistent with severe aortic stenosis. The gradients are  reduced  secondary to low flow state. 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 Mild aortic  regurgitation.  2. Severe segmental left ventricular systolic dysfunction.  The function is best preserved at the basal segments. The  mid to distal segments are severely hypokinetic. The apex  is thin and akinetic. The LV is foreshortened causing  limited evaluation of the true apex. No obvious thrombus  seen however spontaneous echo contrast was seen o the apex.  3. Estimated right ventricular systolic pressure equals 44  mm Hg, assuming right atrial pressure equals 10 mm Hg,  consistent with mild pulmonary hypertension.  4. Tricuspid valve not well visualized. Moderate-severe  tricuspid regurgitation.    < end of copied text >      < from: Cardiac Cath Lab - Adult (07.12.21 @ 13:23) >  CORONARY VESSELS: The coronary circulation is right dominant.  LM:   --  LM: Angiography showed minor luminal irregularities with no flow  limiting lesions.  LAD:   --  LAD: Angiography showed minor luminal irregularities with no  flow limiting lesions.  CX:   --  Circumflex: Angiography showed minor luminal irregularities with  no flow limiting lesions.  RCA:   --  RCA: Angiography showed minor luminal irregularities with no  flow limiting lesions.    < end of copied text >  
[facility]  [phone]  NEPHROLOGY      INITIAL CONSULT NOTE  --------------------------------------------------------------------------------  HPI:      87 yo lady PMHx of HTN and paroxysmal Afib on coumadin who was transferred to [facility] ED from [place] ED where she presented with 2 week hx of intermittent atypical chest pain, with acute exacerbation of chest pain radiating to back for  2 days, also associated with single episode of syncope at home on day of admission     In the [facility] ED, vitals - HR 80-90s Afib, -130s, RR 20, and SpO2 98% on 2L NC. Unremarkable exam reported. Labs c/w CRISSY on CKD (Cr 1.9 compared to 1.4 in 2019), with trop >40, pBNP 8543, and CKMB 94.5. CT chest angio (90cc contrast) without evidence of PE but inconclusive study on aortic dissection. Was transferred to [facility] ED for further management.    At the [facility] ED, vitals - HR 90-100s Afib, /60 at the bedside, RR 20, and SpO2 95% on room air. On physical exam, with JVD ~8cm, without pertinent cardiac or respiratory exam findings, without associated LE edema. Labs c/w CRISSY, hsTrop >10,000, lactate 2.5. EKG with Afib with RBBB, with Q waves on lateral leads. Bedside TTE with reduced EF ~15-20%. Repeat CT chest angio to r/o aortic dissection was negative for significant dissection.    Admitted to CICU for close monitoring, chest pain management on nitro gtt, pending [facility].   noticed with hyperkalemia. renal consult called      PAST HISTORY  --------------------------------------------------------------------------------  PAST MEDICAL & SURGICAL HISTORY:  Afib    Hypertension    Hyperparathyroidism    Osteoporosis    Dyslipidemia    Macular degeneration    Temporal arteritis    Spinal stenosis    Osteoarthritis    Sternal fracture  2010    History of cholecystectomy    H/O nasal septoplasty    History of lumbar laminectomy  with microdiskectomy    H/O parathyroidectomy    H/O total knee replacement, left    History of temporal arteritis  b/l surgical repair    H/O bilateral cataract extraction      FAMILY HISTORY:  Family history of brain cancer (Child)      PAST SOCIAL HISTORY:      past tobacco use quit 20 years ago      ALLERGIES & MEDICATIONS  --------------------------------------------------------------------------------  Allergies    adhesives (Rash)  No Known Drug Allergies    Intolerances      Standing Inpatient Medications  aspirin enteric coated 81 milliGRAM(s) Oral daily  atorvastatin 80 milliGRAM(s) Oral at bedtime  chlorhexidine 2% Cloths 1 Application(s) Topical daily  metoprolol tartrate 12.5 milliGRAM(s) Oral two times a day  nitroglycerin  Infusion 10 MICROgram(s)/Min IV Continuous <Continuous>  sodium zirconium cyclosilicate 10 Gram(s) Oral once  ticagrelor 90 milliGRAM(s) Oral every 12 hours    PRN Inpatient Medications      REVIEW OF SYSTEMS  --------------------------------------------------------------------------------  Gen: No  fevers/chills   Skin: No rashes  Head/Eyes/Ears/Mouth: No headache; Normal hearing;  No sinus pain/discomfort, sore throat  Respiratory: c/o  severe RENEE minimal exertion even in bed. no cough, wheezing, hemoptysis  CV:  persistent chest pain, orthopnea -   GI: No abdominal pain, diarrhea, nausea, + vomiting, melena  : No dysuria, decrease urination or hesitancy urinating  hematuria, nocturia  MSK: No joint pain/swelling; no back pain  Neuro: No dizziness/lightheadedness  also with no edema     All other systems were reviewed and are negative, except as noted.    VITALS/PHYSICAL EXAM  --------------------------------------------------------------------------------  T(C): 36.7 (07-12-21 @ 07:00), Max: 36.8 (07-11-21 @ 17:12)  HR: 76 (07-12-21 @ 12:00) (76 - 113)  BP: 125/80 (07-12-21 @ 01:30) (106/80 - 135/95)  RR: 18 (07-12-21 @ 12:00) (15 - 31)  SpO2: 97% (07-12-21 @ 12:00) (95% - 100%)  Wt(kg): --  Height (cm): 167.6 (07-11-21 @ 23:40)  Weight (kg): 59.1 (07-11-21 @ 23:40)  BMI (kg/m2): 21 (07-11-21 @ 23:40)  BSA (m2): 1.67 (07-11-21 @ 23:40)      07-11-21 @ 07:01  -  07-12-21 @ 07:00  --------------------------------------------------------  IN: 280 mL / OUT: 200 mL / NET: 80 mL    07-12-21 @ 07:01  -  07-12-21 @ 12:46  --------------------------------------------------------  IN: 274 mL / OUT: 200 mL / NET: 74 mL      Physical Exam:  	Gen: pale appearing   	+ JVD  	Pulm: decrease bs  no rales or ronchi or wheezing  	CV: RRR, S1S2; no rub  	Back: No CVA tenderness  	Abd: +BS, soft, nontender/nondistended  	: No suprapubic tenderness  	UE: Warm, no cyanosis  no clubbing,  no edema  	LE: Warm, no cyanosis  no clubbing, no edema  	Neuro: alert and oriented. speech coherent   	Skin: Warm, no decrease skin turgor   	    LABS/STUDIES  --------------------------------------------------------------------------------              12.5   10.95 >-----------<  190      [07-12-21 @ 04:27]              38.9     136  |  102  |  39  ----------------------------<  169      [07-12-21 @ 04:27]  5.5   |  16  |  1.89        Ca     10.9     [07-12-21 @ 04:27]      Mg     1.7     [07-12-21 @ 04:27]      Phos  3.3     [07-12-21 @ 04:27]    TPro  6.5  /  Alb  4.0  /  TBili  1.8  /  DBili  x   /  AST  231  /  ALT  49  /  AlkPhos  73  [07-12-21 @ 04:27]    PT/INR: PT 27.8 , INR 2.42       [07-12-21 @ 04:27]  PTT: 39.2       [07-12-21 @ 04:27]    Troponin >40.000      [07-11-21 @ 17:35]        [07-12-21 @ 04:27]    Creatinine Trend:  SCr 1.89 [07-12 @ 04:27]  SCr 1.80 [07-11 @ 21:34]  SCr 1.99 [07-11 @ 17:35]        TSH 2.32      [07-12-21 @ 08:24]  Lipid: chol 105, TG 89, HDL 51, LDL --      [07-12-21 @ 08:26]          < from: CT Angio Chest Aorta w/wo IV Cont (07.11.21 @ 21:49) >    EXAM:  CT ANGIO CHEST AORTA [facility]                          EXAM:  CT ANGIO ABD PELV (W)AW IC                            PROCEDURE DATE:  07/11/2021            INTERPRETATION:  CLINICAL INFORMATION: Substernal chest pain, syncope.    COMPARISON: Chest CT from 7/11/2021, 7:20 PM. CT abdomen/pelvis from 11/26/2015, abdominal MR from 11/27/2015.    CONTRAST/COMPLICATIONS:  IV Contrast: Omnipaque 350  110 cc administered  Oral Contrast: None  Complications: None    PROCEDURE:  CT Angiography of the Chest, Abdomen and Pelvis.  Gated precontrast imaging was performed through the heart followed by gated CT Angiography of the heart with subsequent non-gated arterial phase imaging of the chest, abdomen and pelvis.  Sagittal and coronal reformats wereperformed as well as 3D (MIP) reconstructions.    FINDINGS:    Vascular: The aorta is normal in caliber. There is no intramural hematoma, dissection, or penetrating aortic ulcer.    The great vessels are patent.    The celiac axis, SMA, bilateral renal arteries and RICHARD are patent. The bilateral common iliac arteries and their branches are patent. Atherosclerotic disease noted within the aorta.    Even though this study was not tailored for pulmonary embolism, there is no main or central pulmonaryembolus.      The aorta measures (all measurements are in centimeters):  *  At the sinuses of valsalva: 3.1  *  At the sinotubular junction: 2.5  *  Ascending thoracic aorta at the main pulmonary artery: 3.5  *  Descending thoracic aorta at the main pulmonary artery: 2.6  *  Descending thoracic aorta at the left inferior pulmonary vein: 2.3  *  Descending thoracic aorta at the diaphragmatic hiatus: 2.1        CHEST:  LUNGS AND LARGE AIRWAYS: Patent central airways. Emphysema. 1.1 cm right upper lobe pulmonary nodule is again noted.  PLEURA: Small right-sided pleural effusion. No pneumothorax.  VESSELS: As described above.  HEART: Cardiomegaly. No pericardial effusion.  MEDIASTINUM AND FRANKLIN: No lymphadenopathy.  CHEST WALL AND LOWER NECK: Within normal limits.    ABDOMEN AND PELVIS:  LIVER: Hepatic hemangiomas, unchanged.  BILE DUCTS: CBD measures up to 1.3 cm, as expected in postcholecystectomy state.  GALLBLADDER: Cholecystectomy.  SPLEEN: Within normal limits.  PANCREAS: Within normal limits.  ADRENALS: Within normal limits.  KIDNEYS/URETERS: Bilateral cysts. No hydronephrosis.    BLADDER: Partially filled with contrast. A right posterior-lateral diverticulum.  REPRODUCTIVE ORGANS: Uterus and adnexa within normal limits.    BOWEL: No bowel obstruction. Appendix is normal. Colonic diverticulosis without diverticulitis.  PERITONEUM: Small amount subhepatic free fluid with extension into the right paracolic gutter and pelvis.  VESSELS: Contrast reflux into the hepatic veins. Extensive atherosclerotic changes of aorta and its branches.  RETROPERITONEUM/LYMPH NODES: No enlarged lymph nodes measuring greater than 10 mm in short axis.  ABDOMINAL WALL: Within normal limits.  BONES: Chronic appearing sternal deformity. Multilevel degenerative changes of the visualized spine.    IMPRESSION:    No aortic aneurysm, intramural hematoma or dissection. Cardiomegaly. Contrast reflux into the hepatic veins suggesting right-sided heart failure.    Emphysema. Small right-sided pleural effusion. Right upper lobe 1.1 cm nodule. 3 months follow-up is recommended.    Colonic diverticulosis without diverticulitis. Small amount of ascites as described above.    --- End of Report ---              DOMINICK PEREYRA MD; Resident Radiology  This document has been electronically signed.  TODD LARA   This document has been electronically signed. Jul 11 2021 11:10PM    < end of copied text >        `< from: CT Angio Abdomen and Pelvis w/ IV Cont (07.11.21 @ 21:49) >    EXAM:  CT ANGIO CHEST AORTA [facility]                          EXAM:  CT ANGIO ABD PELV (W)AW IC                            PROCEDURE DATE:  07/11/2021            INTERPRETATION:  CLINICAL INFORMATION: Substernal chest pain, syncope.    COMPARISON: Chest CT from 7/11/2021, 7:20 PM. CT abdomen/pelvis from 11/26/2015, abdominal MR from 11/27/2015.    CONTRAST/COMPLICATIONS:  IV Contrast: Omnipaque 350  110 cc administered  Oral Contrast: None  Complications: None    PROCEDURE:  CT Angiography of the Chest, Abdomen and Pelvis.  Gated precontrast imaging was performed through the heart followed by gated CT Angiography of the heart with subsequent non-gated arterial phase imaging of the chest, abdomen and pelvis.  Sagittal and coronal reformats wereperformed as well as 3D (MIP) reconstructions.    FINDINGS:    Vascular: The aorta is normal in caliber. There is no intramural hematoma, dissection, or penetrating aortic ulcer.    The great vessels are patent.    The celiac axis, SMA, bilateral renal arteries and RICHARD are patent. The bilateral common iliac arteries and their branches are patent. Atherosclerotic disease noted within the aorta.    Even though this study was not tailored for pulmonary embolism, there is no main or central pulmonaryembolus.      The aorta measures (all measurements are in centimeters):  *  At the sinuses of valsalva: 3.1  *  At the sinotubular junction: 2.5  *  Ascending thoracic aorta at the main pulmonary artery: 3.5  *  Descending thoracic aorta at the main pulmonary artery: 2.6  *  Descending thoracic aorta at the left inferior pulmonary vein: 2.3  *  Descending thoracic aorta at the diaphragmatic hiatus: 2.1        CHEST:  LUNGS AND LARGE AIRWAYS: Patent central airways. Emphysema. 1.1 cm right upper lobe pulmonary nodule is again noted.  PLEURA: Small right-sided pleural effusion. No pneumothorax.  VESSELS: As described above.  HEART: Cardiomegaly. No pericardial effusion.  MEDIASTINUM AND FRANKLIN: No lymphadenopathy.  CHEST WALL AND LOWER NECK: Within normal limits.    ABDOMEN AND PELVIS:  LIVER: Hepatic hemangiomas, unchanged.  BILE DUCTS: CBD measures up to 1.3 cm, as expected in postcholecystectomy state.  GALLBLADDER: Cholecystectomy.  SPLEEN: Within normal limits.  PANCREAS: Within normal limits.  ADRENALS: Within normal limits.  KIDNEYS/URETERS: Bilateral cysts. No hydronephrosis.    BLADDER: Partially filled with contrast. A right posterior-lateral diverticulum.  REPRODUCTIVE ORGANS: Uterus and adnexa within normal limits.    BOWEL: No bowel obstruction. Appendix is normal. Colonic diverticulosis without diverticulitis.  PERITONEUM: Small amount subhepatic free fluid with extension into the right paracolic gutter and pelvis.  VESSELS: Contrast reflux into the hepatic veins. Extensive atherosclerotic changes of aorta and its branches.  RETROPERITONEUM/LYMPH NODES: No enlarged lymph nodes measuring greater than 10 mm in short axis.  ABDOMINAL WALL: Within normal limits.  BONES: Chronic appearing sternal deformity. Multilevel degenerative changes of the visualized spine.    IMPRESSION:    No aortic aneurysm, intramural hematoma or dissection. Cardiomegaly. Contrast reflux into the hepatic veins suggesting right-sided heart failure.    Emphysema. Small right-sided pleural effusion. Right upper lobe 1.1 cm nodule. 3 months follow-up is recommended.    Colonic diverticulosis without diverticulitis. Small amount of ascites as described above.    --- End of Report ---              DOMINICK PEREYRA MD; Resident Radiology  This document has been electronically signed.  TODD LARA   This document has been electronically signed. Jul 11 2021 11:10PM    < end of copied text >  < from: CT Angio Chest PE Protocol w/ IV Cont (07.11.21 @ 19:28) >    EXAM:  CT ANGIO CHEST PULM [facility]                            *** ADDENDUM 07/11/2021  ***    ADDENDUM:  Recommend follow up CT in 3 months to evaluate the 1.1 cm right upper lobe pulmonary nodule.    --- End of Report ---    *** END OF ADDENDUM 07/11/2021  ***            PROCEDURE DATE:  07/11/2021          INTERPRETATION:  CLINICAL INFORMATION: Chest and back pain, syncope, evaluate for aortic dissection.    COMPARISON: None.    CONTRAST/COMPLICATIONS:  IV Contrast: Omnipaque 350  90 cc administered   10 cc discarded  Oral Contrast: NONE  Complications: None reported at time of study completion    PROCEDURE:  Precontrast images of the chest were obtained.  CT Angiography of the Chest.  Sagittal and coronal reformats were performed as well as 3D (MIP) reconstructions.    FINDINGS:    LUNGS AND AIRWAYS: Patent central airways. Mild paraseptal and centrilobular emphysematous changes. There is a 1.1 cm pulmonary nodule in the right upper lobe.  PLEURA: Trace bilateral pleural effusions,right greater than left.  MEDIASTINUM AND FRANKLIN: No lymphadenopathy.  VESSELS: Pulmonary arterial opacification is optimal. No pulmonary embolism. Inadequate aortic opacification to comment on aortic dissection. Atherosclerosis calcification of the thoracic aorta and coronary arteries.  HEART: Heart size is enlarged. No pericardial effusion.  CHEST WALL AND LOWER NECK: Within normal limits.  VISUALIZED UPPER ABDOMEN: Bilateral renal cysts.    BONES: Old sternal body fracture.    IMPRESSION:    No pulmonary embolism.  Inadequate aortic opacification to comment on aortic dissection. If strong clinical concern persists for aortic dissection, repeat CTA may be obtained.  Emphysema.  Cardiomegaly.  Trace bilateral pleural effusions, right greater than left.        --- End of Report ---      ***Please see the addendum at the top of this report. It may contain additional important information or changes.****        SOVALDO CANAS MD; Attending Radiologist  This document has been electronically signed. Jul 11 2021  7:43PM  Addend:OSVALDO CANAS MD; Attending Radiologist  This addendum was electronically signed on: Jul 11 2021  8:34PM.    < end of copied text >  
HPI:  87 yo lady PMHx of HTN and paroxysmal Afib on coumadin who was transferred to [facility] ED from [facility] ED where she presented with 2 week hx of intermittent atypical chest pain, with acute exacerbation of chest pain radiating to back for the last 2 days, also associated with single episode of syncope at home on day of presentation.    In the [facility] ED, vitals - HR 80-90s Afib, -130s, RR 20, and SpO2 98% on 2L NC. Unremarkable exam reported. Labs c/w CRISSY on CKD (Cr 1.9 compared to 1.4 in 2019), with trop >40, pBNP 8543, and CKMB 94.5. CT chest angio (90cc contrast) without evidence of PE but inconclusive study on aortic dissection. Was transferred to [facility] ED for further management.    At the [facility] ED, vitals - HR 90-100s Afib, /60 at the bedside, RR 20, and SpO2 95% on room air. On physical exam, with JVD ~8cm, without pertinent cardiac or respiratory exam findings, without associated LE edema. Labs c/w CRISSY, hsTrop >10,000, lactate 2.5. EKG with Afib with RBBB, with Q waves on lateral leads. Bedside TTE with reduced EF ~15-20%. Repeat CT chest angio to r/o aortic dissection was negative for significant dissection.    Admitted to CICU for close monitoring, chest pain management on nitro gtt. She underwent LHC which showed no significant obstructive CAD. She is being managed with levo and dobutamine for cardiogenic shock. She reports intermittently CP and abdominal pain.           PAST MEDICAL & SURGICAL HISTORY:  Afib    Hypertension    Hyperparathyroidism    Osteoporosis    Dyslipidemia    Macular degeneration    Temporal arteritis    Spinal stenosis    Osteoarthritis    Sternal fracture  2010    History of cholecystectomy    H/O nasal septoplasty    History of lumbar laminectomy  with microdiskectomy    H/O parathyroidectomy    H/O total knee replacement, left    History of temporal arteritis  b/l surgical repair    H/O bilateral cataract extraction        Review of Systems:  14 point ROS done and found to be negative or noncontributory other than noted in HPI.    MEDICATIONS  (STANDING):  aspirin enteric coated 81 milliGRAM(s) Oral daily  atorvastatin 80 milliGRAM(s) Oral at bedtime  chlorhexidine 2% Cloths 1 Application(s) Topical daily  DOBUTamine Infusion 5 MICROgram(s)/kG/Min (8.87 mL/Hr) IV Continuous <Continuous>  melatonin 3 milliGRAM(s) Oral at bedtime  multivitamin 1 Tablet(s) Oral daily  norepinephrine Infusion 0.05 MICROgram(s)/kG/Min (5.54 mL/Hr) IV Continuous <Continuous>  sodium bicarbonate 650 milliGRAM(s) Oral three times a day    MEDICATIONS  (PRN):  acetaminophen   Tablet .. 650 milliGRAM(s) Oral every 6 hours PRN Temp greater or equal to 38C (100.4F), Mild Pain (1 - 3), Moderate Pain (4 - 6)  sodium chloride 0.9% lock flush 10 milliLiter(s) IV Push every 1 hour PRN Pre/post blood products, medications, blood draw, and to maintain line patency      HOME MEDICATIONS:    Allergies    adhesives (Rash)  No Known Drug Allergies    SOCIAL HISTORY:  Lives home alone  No smoking, alcohol or illicit drug use    FAMILY HISTORY:  Family history of brain cancer (Child)    Vital Signs Last 24 Hrs  T(C): 37.7 (14 Jul 2021 16:00), Max: 37.7 (14 Jul 2021 12:00)  T(F): 99.9 (14 Jul 2021 16:00), Max: 99.9 (14 Jul 2021 12:00)  HR: 92 (14 Jul 2021 16:00) (80 - 110)  BP: MAP 58-72  RR: 23 (14 Jul 2021 15:30) (16 - 39)  SpO2: 100% (14 Jul 2021 16:00) (94% - 100%)    Tele: afib HR     General: Frail. No distress. Comfortable.  HEENT: EOM intact.  Neck: Neck supple. JVP 6 cm H2O. No masses  Chest: Clear to auscultation bilaterally  CV: Irregular. Normal S1 and S2. II/VI SM LLSB Radial pulses normal. No LE edema. Warm peripherally.  Abdomen: Soft, non-distended, diffusely mildly tender to palpation  Skin: No rashes or skin breakdown  Neurology: Alert and oriented times three. Sensation intact  Psych: Affect normal    LABS:                        10.5   10.51 )-----------( 162      ( 14 Jul 2021 10:15 )             31.2     07-14    136  |  106  |  50<H>  ----------------------------<  159<H>  4.3   |  18<L>  |  3.16<H>    Ca    9.0      14 Jul 2021 10:15  Phos  2.4     07-14  Mg     1.9     07-14    TPro  5.0<L>  /  Alb  3.0<L>  /  TBili  1.6<H>  /  DBili  x   /  AST  84<H>  /  ALT  49<H>  /  AlkPhos  61  07-14    PT/INR - ( 14 Jul 2021 10:15 )   PT: 14.5 sec;   INR: 1.22 ratio      PTT - ( 14 Jul 2021 01:22 )  PTT:40.1 sec

## 2021-07-14 NOTE — PROGRESS NOTE ADULT - SUBJECTIVE AND OBJECTIVE BOX
ANDRE DE LUNA  MRN-[id number]  Patient is a 86y old  Female who presents with a chief complaint of late presentation MI (2021 19:42)    HPI:  85 yo lady PMHx of HTN and paroxysmal Afib on coumadin who was transferred to [facility] ED from [place] ED where she presented with 2 week hx of intermittent atypical chest pain, with acute exacerbation of chest pain radiating to back for the last 2 days, also associated with single episode of syncope at home today.     In the OSH ED, vitals - HR 80-90s Afib, -130s, RR 20, and SpO2 98% on 2L NC. Unremarkable exam reported. Labs c/w CRISSY on CKD (Cr 1.9 compared to 1.4 in 2019), with trop >40, pBNP 8543, and CKMB 94.5. CT chest angio (90cc contrast) without evidence of PE but inconclusive study on aortic dissection. Was transferred to [facility] ED for further management.    At the [facility] ED, vitals - HR 90-100s Afib, /60 at the bedside, RR 20, and SpO2 95% on room air. On physical exam, with JVD ~8cm, without pertinent cardiac or respiratory exam findings, without associated LE edema. Labs c/w CRISSY, hsTrop >10,000, lactate 2.5. EKG with Afib with RBBB, with Q waves on lateral leads. Bedside TTE with reduced EF ~15-20%. Repeat CT chest angio to r/o aortic dissection was negative for significant dissection.    Admitted to CICU for close monitoring, chest pain management on nitro gtt, pending [facility] in AM      (2021 00:11)      Hospital Course:   transferred to CCU for further management     24 HOUR EVENTS:    REVIEW OF SYSTEMS:   Constitutional: No fevers, or chills  Eyes/ENT: No visual changes  Respiratory: No cough, wheezing, hemoptysis  Cardiovascular: No chest pain, no palpitations  Gastrointestinal: No abdominal pain.   Genitourinary: No dysuria  Neurological: No numbness, no weakness  Skin: No itching, rashes    ICU Vital Signs Last 24 Hrs  T(C): 37.8 (2021 19:00), Max: 37.8 (2021 19:00)  T(F): 100 (2021 19:00), Max: 100 (2021 19:00)  HR: 100 (2021 19:45) (80 - 110)  BP: --  BP(mean): --  ABP: 104/52 (2021 19:45) (80/42 - 116/62)  ABP(mean): 72 (2021 19:45) (58 - 84)  RR: 18 (2021 19:45) (16 - 33)  SpO2: 97% (2021 19:45) (94% - 100%)      CVP(mm Hg): 9 (21 @ 19:45) (-2 - 10)  CO: --  CI: --  PA: 49/21 (21 @ 19:45) (32/10 - 50/23)  PA(mean): 32 (21 @ 19:45) (19 - 34)  PA(direct): --  PCWP: --  LA: --  RA: --  SVR: --  SVRI: --  PVR: --  PVRI: --  I&O's Summary    2021 07:01  -  2021 07:00  --------------------------------------------------------  IN: 2968.2 mL / OUT: 740 mL / NET: 2228.2 mL    2021 07:01  -  2021 20:20  --------------------------------------------------------  IN: 681 mL / OUT: 505 mL / NET: 176 mL        CAPILLARY BLOOD GLUCOSE    CAPILLARY BLOOD GLUCOSE          PHYSICAL EXAM:   General: No acute distress  Eyes: EOMI, PERRLA, conjunctiva and sclera clear  Chest/Lung: CTAB, no wheezes, rales, or rhonchi  Heart: Regular rate, regular rhythm. Normal S1/S2. No murmurs, rubs, or gallops.  Abdomen: Soft, nontender, nondistended. Normal bowel sounds.  Extremites: 2+ peripheral pulses B/L. No clubbing, cyanosis, or edema.  Neurology: A&O x3, no focal deficits  Skin: No rashes or lesions  ============================I/O===========================   I&O's Detail    2021 07:  -  2021 07:00  --------------------------------------------------------  IN:    DOBUTamine: 52.8 mL    IV PiggyBack: 563 mL    Norepinephrine: 172.4 mL    Oral Fluid: 880 mL    sodium chloride 0.9%: 550 mL    Sodium Chloride 0.9% Bolus: 750 mL  Total IN: 2968.2 mL    OUT:    Indwelling Catheter - Urethral (mL): 490 mL    Voided (mL): 250 mL  Total OUT: 740 mL    Total NET: 2228.2 mL      2021 07:01  -  2021 20:20  --------------------------------------------------------  IN:    DOBUTamine: 22 mL    DOBUTamine: 93.2 mL    IV PiggyBack: 110 mL    Norepinephrine: 215.8 mL    Oral Fluid: 240 mL  Total IN: 681 mL    OUT:    Indwelling Catheter - Urethral (mL): 505 mL  Total OUT: 505 mL    Total NET: 176 mL        ============================ LABS =========================                        10.5   10.51 )-----------( 162      ( 2021 10:15 )             31.2     07-14    136  |  106  |  50<H>  ----------------------------<  159<H>  4.3   |  18<L>  |  3.16<H>    Ca    9.0      2021 10:15  Phos  2.4       Mg     1.9         TPro  5.0<L>  /  Alb  3.0<L>  /  TBili  1.6<H>  /  DBili  x   /  AST  84<H>  /  ALT  49<H>  /  AlkPhos  61  -14    LIVER FUNCTIONS - ( 2021 10:15 )  Alb: 3.0 g/dL / Pro: 5.0 g/dL / ALK PHOS: 61 U/L / ALT: 49 U/L / AST: 84 U/L / GGT: x           PT/INR - ( 2021 17:28 )   PT: 13.3 sec;   INR: 1.11 ratio         PTT - ( 2021 01:22 )  PTT:40.1 sec  ABG - ( 2021 10:17 )  pH, Arterial: 7.45  pH, Blood: x     /  pCO2: 30    /  pO2: 117   / HCO3: 20    / Base Excess: -2.6  /  SaO2: 99                ======================Micro/Rad/Cardio=================  Telemetry: Reviewed   EKG: Reviewed  CXR: Reviewed  Culture: Reviewed   Echo: Reviewed  Cath: Reviewed  ======================================================  PAST MEDICAL & SURGICAL HISTORY:  Afib    Hypertension    Hyperparathyroidism    Osteoporosis    Dyslipidemia    Macular degeneration    Temporal arteritis    Spinal stenosis    Osteoarthritis    Sternal fracture  2010    History of cholecystectomy    H/O nasal septoplasty    History of lumbar laminectomy  with microdiskectomy    H/O parathyroidectomy    H/O total knee replacement, left    History of temporal arteritis  b/l surgical repair    H/O bilateral cataract extraction      ====================ASSESSMENT ==============  Late presentation inferolateral STEMI  CRISSY likely in setting of BLANCA  Anemia     Plan:  ====================== NEUROLOGY=====================  AOx3  - no acute issues   - tylenol PRN for analgesia/fever   - sleep regimen with melatonin     acetaminophen   Tablet .. 650 milliGRAM(s) Oral every 6 hours PRN Temp greater or equal to 38C (100.4F), Mild Pain (1 - 3), Moderate Pain (4 - 6)  melatonin 5 milliGRAM(s) Oral at bedtime    ==================== RESPIRATORY======================  Stable on RA, SpO2 96%  - continue pulse ox monitoring, follow ABGs       ====================CARDIOVASCULAR==================  Late presentation inferolateral STEMI c/b cardiogenic shock  - LHC revealing clear cors  - presentation possibly 2/2 tako vs severe AS vs myopericarditis   - cont ASA statin and BB   - trend CE   - patient acutely hypotensive , received multiple fluid boluses during the day and placed on NS 50 per hr.   - Overnight patient started on  and levo, worsening renal fxn noted   - TLC and [place] placed   - monitor CVPs, VBGs and uptitrate inotrope as needed   - f/u repeat echo in the AM to evaluate for mechanical complications given hypotension     Severe AS  - structural eval noted  - cont     aspirin enteric coated 81 milliGRAM(s) Oral daily  atorvastatin 80 milliGRAM(s) Oral at bedtime  DOBUTamine Infusion 5 MICROgram(s)/kG/Min (8.87 mL/Hr) IV Continuous <Continuous>  norepinephrine Infusion 0.05 MICROgram(s)/kG/Min (5.54 mL/Hr) IV Continuous <Continuous>    ===================HEMATOLOGIC/ONC ===================  Anemia  - monitor H&H/Plts     ===================== RENAL =========================  CRISSY likely in setting of BLANCA and low flow  - continue dobutamine  - SCr baseline appears ~1.4 (2019)  - Avoid nephrotoxins, renally dose meds  - Renal consult noted      ==================== GASTROINTESTINAL===================  Tolerating mechanical soft diet.    multivitamin 1 Tablet(s) Oral daily  sodium bicarbonate 650 milliGRAM(s) Oral three times a day  sodium chloride 0.9% lock flush 10 milliLiter(s) IV Push every 1 hour PRN Pre/post blood products, medications, blood draw, and to maintain line patency    =======================    ENDOCRINE  =====================  No acute issues   - continue monitoring blood glucose closely for need to initiate sliding scale       ========================INFECTIOUS DISEASE================  Febrile, 100F, WBC within normal limits  - Continue trending WBC and monitoring fever curve       Patient requires continuous monitoring with bedside rhythm monitoring, pulse ox monitoring, and intermittent blood gas analysis. Care plan discussed with ICU care team. Patient remained critical and at risk for life threatening decompensation.  Patient seen, examined and plan discussed with CCU team during rounds.     I have personally provided 35 minutes of critical care time excluding time spent on separate procedures.    By signing my name below, I, Marion John, attest that this documentation has been prepared under the direction and in the presence of Mallika Thomas NP  Electronically signed: Maritza Michelle, 21 @ 20:20    Mallika MANCINI NP, personally performed the services described in this documentation. all medical record entries made by the conibedwina were at my direction and in my presence. I have reviewed the chart and agree that the record reflects my personal performance and is accurate and complete  Electronically signed: Mallika Thomas, NP       ANDRE DE LUNA  MRN-[id number]  Patient is a 86y old  Female who presents with a chief complaint of late presentation MI (2021 19:42)    HPI:  87 yo lady PMHx of HTN and paroxysmal Afib on coumadin who was transferred to [facility] from [place] ED where she presented with 2 week hx of intermittent atypical chest pain, with acute exacerbation of chest pain radiating to back for the last 2 days, also associated with single episode of syncope at home today.     In the OSH ED, vitals - HR 80-90s Afib, -130s, RR 20, and SpO2 98% on 2L NC. Unremarkable exam reported. Labs c/w CRISSY on CKD (Cr 1.9 compared to 1.4 in 2019), with trop >40, pBNP 8543, and CKMB 94.5. CT chest angio (90cc contrast) without evidence of PE but inconclusive study on aortic dissection. Was transferred to [facility] for further management.    At the [facility] ED, vitals - HR 90-100s Afib, /60 at the bedside, RR 20, and SpO2 95% on room air. On physical exam, with JVD ~8cm, without pertinent cardiac or respiratory exam findings, without associated LE edema. Labs c/w CRISSY, hsTrop >10,000, lactate 2.5. EKG with Afib with RBBB, with Q waves on lateral leads. Bedside TTE with reduced EF ~15-20%. Repeat CT chest angio to r/o aortic dissection was negative for significant dissection.    Admitted to CICU for close monitoring, chest pain management on nitro gtt, pending [facility] in AM      (2021 00:11)      Hospital Course:   transferred to CCU for further management     24 HOUR EVENTS:  -rij PAC inserted    REVIEW OF SYSTEMS:   Constitutional: No fevers, or chills  Eyes/ENT: No visual changes  Respiratory: No cough, wheezing, hemoptysis  Cardiovascular: No chest pain, no palpitations  Gastrointestinal: No abdominal pain.   Genitourinary: No dysuria  Neurological: No numbness, no weakness  Skin: No itching, rashes    ICU Vital Signs Last 24 Hrs  T(C): 37.8 (2021 19:00), Max: 37.8 (2021 19:00)  T(F): 100 (2021 19:00), Max: 100 (2021 19:00)  HR: 100 (2021 19:45) (80 - 110)  ABP: 104/52 (2021 19:45) (80/42 - 116/62)  ABP(mean): 72 (2021 19:45) (58 - 84)  RR: 18 (2021 19:45) (16 - 33)  SpO2: 97% (2021 19:45) (94% - 100%)      CVP(mm Hg): 9 (21 @ 19:45) (-2 - 10)  PA: 49/21 (21 @ 19:45) (32/10 - 50/23)  PA(mean): 32 (21 @ 19:45) (19 - 34)  I&O's Summary    2021 07:  -  2021 07:00  --------------------------------------------------------  IN: 2968.2 mL / OUT: 740 mL / NET: 2228.2 mL    2021 07:01  -  2021 20:20  --------------------------------------------------------  IN: 681 mL / OUT: 505 mL / NET: 176 mL    PHYSICAL EXAM:   General: No acute distress  Eyes: EOMI, PERRLA, conjunctiva and sclera clear  Chest/Lung: CTAB, no wheezes, rales, or rhonchi  Heart: Regular rate, regular rhythm. Normal S1/S2. No murmurs, rubs, or gallops.  Abdomen: Soft, nontender, nondistended. Normal bowel sounds.  Extremites: 2+ peripheral pulses B/L. No clubbing, cyanosis, or edema.  Neurology: A&O x3, no focal deficits  Skin: No rashes or lesions  ============================I/O===========================   I&O's Detail    2021 07:01  -  2021 07:00  --------------------------------------------------------  IN:    DOBUTamine: 52.8 mL    IV PiggyBack: 563 mL    Norepinephrine: 172.4 mL    Oral Fluid: 880 mL    sodium chloride 0.9%: 550 mL    Sodium Chloride 0.9% Bolus: 750 mL  Total IN: 2968.2 mL    OUT:    Indwelling Catheter - Urethral (mL): 490 mL    Voided (mL): 250 mL  Total OUT: 740 mL    Total NET: 2228.2 mL      2021 07:01  -  2021 20:20  --------------------------------------------------------  IN:    DOBUTamine: 22 mL    DOBUTamine: 93.2 mL    IV PiggyBack: 110 mL    Norepinephrine: 215.8 mL    Oral Fluid: 240 mL  Total IN: 681 mL    OUT:    Indwelling Catheter - Urethral (mL): 505 mL  Total OUT: 505 mL    Total NET: 176 mL    ============================ LABS =========================                        10.5   10.51 )-----------( 162      ( 2021 10:15 )             31.2     -14    136  |  106  |  50<H>  ----------------------------<  159<H>  4.3   |  18<L>  |  3.16<H>    Ca    9.0      2021 10:15  Phos  2.4       Mg     1.9     -14    TPro  5.0<L>  /  Alb  3.0<L>  /  TBili  1.6<H>  /  DBili  x   /  AST  84<H>  /  ALT  49<H>  /  AlkPhos  61  -14    LIVER FUNCTIONS - ( 2021 10:15 )  Alb: 3.0 g/dL / Pro: 5.0 g/dL / ALK PHOS: 61 U/L / ALT: 49 U/L / AST: 84 U/L / GGT: x           PT/INR - ( 2021 17:28 )   PT: 13.3 sec;   INR: 1.11 ratio         PTT - ( 2021 01:22 )  PTT:40.1 sec  ABG - ( 2021 10:17 )  pH, Arterial: 7.45  pH, Blood: x     /  pCO2: 30    /  pO2: 117   / HCO3: 20    / Base Excess: -2.6  /  SaO2: 99        ======================Micro/Rad/Cardio=================  Telemetry: Reviewed   EKG: Reviewed  CXR: Reviewed  Culture: Reviewed   Echo: Reviewed  Cath: Reviewed  ======================================================  PAST MEDICAL & SURGICAL HISTORY:  Afib  Hypertension  Hyperparathyroidism  Osteoporosis  Dyslipidemia  Macular degeneration  Temporal arteritis  Spinal stenosis  Osteoarthritis  Sternal fracture  2010  History of cholecystectomy  H/O nasal septoplasty  History of lumbar laminectomy  with microdiskectomy  H/O parathyroidectomy  H/O total knee replacement, left  History of temporal arteritis  b/l surgical repair  H/O bilateral cataract extraction      ====================ASSESSMENT ==============  Late presentation inferolateral STEMI  CRISSY likely in setting of BLANCA  Anemia     Plan:  ====================== NEUROLOGY=====================  AOx3  - no acute issues   - tylenol PRN for analgesia/fever   - sleep regimen with melatonin     ==================== RESPIRATORY======================  Stable on RA, SpO2 96%  - continue pulse ox monitoring, follow ABGs     ====================CARDIOVASCULAR==================  Late presentation inferolateral STEMI c/b cardiogenic shock  - LHC revealing clear cors  - presentation possibly 2/2 tako vs severe AS vs myopericarditis   - cont ASA statin and BB   - Overnight patient started on  and levo, worsening renal fxn noted   - TLC and [place] placed   - monitor CVPs, VBGs and uptitrate inotrope as needed   - f/u repeat echo in the AM to evaluate for mechanical complications given hypotension     Severe AS  - structural team following  - cont     ===================HEMATOLOGIC/ONC ===================  Anemia  - monitor H&H/Plts     ===================== RENAL =========================  CRISSY likely in setting of BLANCA and low flow  - continue dobutamine  - SCr baseline appears ~1.4 (2019)  - Avoid nephrotoxins, renally dose meds  - Renal consult noted  ==================== GASTROINTESTINAL===================  Tolerating mechanical soft diet.  =======================    ENDOCRINE  =====================  No acute issues   - continue monitoring blood glucose closely for need to initiate sliding scale   ========================INFECTIOUS DISEASE================  Afebrile , WBC WNL  - Continue trending WBC and monitoring fever curve       Patient requires continuous monitoring with bedside rhythm monitoring, pulse ox monitoring, and intermittent blood gas analysis. Care plan discussed with ICU care team. Patient remained critical and at risk for life threatening decompensation.  Patient seen, examined and plan discussed with CCU team during rounds.     I have personally provided 35 minutes of critical care time excluding time spent on separate procedures.    By signing my name below, I, Marion John, attest that this documentation has been prepared under the direction and in the presence of Mallika Thomas NP  Electronically signed: Maritza Michelle, 21 @ 20:20    I, Mallika Thomas NP, personally performed the services described in this documentation. all medical record entries made by the scribe were at my direction and in my presence. I have reviewed the chart and agree that the record reflects my personal performance and is accurate and complete  Electronically signed: Mallika Thomas NP

## 2021-07-14 NOTE — CONSULT NOTE ADULT - PROBLEM SELECTOR RECOMMENDATION 9
- symptomatic multivalvular disease with severely depressed EF  -- will review TTE with Dr. Josue and team  - [facility] done, shows minor CAD  - further TAVR workup will include carotid dopplers, PFT's and cardiac CTA  -- I will request the carotids and PFTs but hold off on the CTA due to her contrast induced nephropathy  - she will also be evaluated by a cardiac surgeon  - I described the symptoms and natural progression of aortic stenosis, as well as the testing and TAVR procedure to the patient and answered all her questions.
- agree with placement of swan for ongoing hemodynamic monitoring  - continue dobutamine at current dose  - wean levo as able for SBP 90  - consider gentle IVF for goal CVP 6-8

## 2021-07-14 NOTE — DIETITIAN INITIAL EVALUATION ADULT. - PERTINENT LABORATORY DATA
07-14 Na136 mmol/L Glu 159 mg/dL<H> K+ 4.3 mmol/L Cr  3.16 mg/dL<H> BUN 50 mg/dL<H> Phos n/a   Alb 3.0 g/dL<L> PAB n/a

## 2021-07-14 NOTE — PROGRESS NOTE ADULT - ASSESSMENT
Ms Sharpe is an 87y/o female with PMHx of HTN and Afib (on Coumadin) admitted with syncope and chest pain.  Acute on chronic systolic heart failure.    NYHA II  STS risk for AVR: 11.76%    will review TTE with Dr. Josue and team  - C done, shows minor CAD  - further TAVR workup will include carotid dopplers, PFT's and cardiac CTA  -request the carotids and PFTs but hold off on the CTA due to her contrast induced nephropathy  - she will also be evaluated by a cardiac surgeon

## 2021-07-14 NOTE — CONSULT NOTE ADULT - PROBLEM SELECTOR RECOMMENDATION 3
- IVF as above  - continue inotropic support, wean levo as able  - strict I/Os  - nephrology following

## 2021-07-14 NOTE — PROGRESS NOTE ADULT - ASSESSMENT
87 yo lady PMHx of HTN and paroxysmal Afib on coumadin who was transferred to [facility] ED from [place] ED where she presented with 2 week hx of intermittent chest pain, with acute exacerbation of chest pain radiating to back  associated with single episode of syncope at home on day of admission. 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      1- CKD III/IV with CRISSY   2- a fib  3- chest pains   4- CHF  5- aortic stenosis   6- hypercalcemia       crissy in setting of contrast nephropathy  pt also with severe AS   gentle ivf hydration as tolerated with dobutamine support  raymundo west as planned to evaluate  structural heart team eval for TAVR   strict I/O  suspect shpt due to underlying ckd suspected   d/w CCU team when seen earlier.

## 2021-07-14 NOTE — PROGRESS NOTE ADULT - ATTENDING COMMENTS
Patient seen and examined. Agree with assessment and plan as outlined above.  85 yo F with HTN and paroxysmal a-fib with cardiogenic shock in the setting of newly diagnosed cardiomyopathy. Severe segmental LV systolic dysfunction with severe aortic stenosis. No significant obstructive cad on LHC. Patient in cardiogenic shock requiring low dose levophed/dobutamine. Bedside swan performed after reversal of INR. Mixed venous O2 sat of 57%. Titrate up dobutamine to 5 mcg. CVP of approximately 8 with PA diastolic pressure 15-20. Hold off on diuretics. Patient maintaining urine output with low dose inotrope and low dose levophed. Appreciate renal follow-up. Patient remains critically ill. Could consider IABP or aortic valvuloplasty if patient not improving.

## 2021-07-14 NOTE — CONSULT NOTE ADULT - ASSESSMENT
87 yo lady PMHx of HTN and paroxysmal Afib on coumadin who was transferred to [facility] ED from [place] ED where she presented with 2 week hx of intermittent chest pain, with acute exacerbation of chest pain radiating to back  associated with single episode of syncope at home on day of admission. 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      1- CKD III/IV   2- a fib  3- chest pains   4- CHF  5- aortic stenosis   6- hyperkalemia     pt is with chf, has had chest pains, has had ckd as well as already received iv contrast x 2. she is in need for another IV contrast for LHC. she will stand at risk for worsening renal function with these iv contrast causing worsening renal function and need for hd as well. risks were d/w pt in detail including risk of dialysis in detail   she understands and agrees to proceed from renal stand point   will diurese as needed in near future  strict I/O   grove if needed  tridil drip  lokelma 10 g po   low k diet   d/w CCU team     
Ms Sharpe is an 87y/o female with PMHx of HTN and Afib (on Coumadin) admitted with syncope and chest pain.  Acute on chronic systolic heart failure.    NYHA II  STS risk for AVR: 11.76%
87 yo lady PMHx of HTN and paroxysmal Afib on coumadin who presented with CP and syncope. LHC did not show significant CAD. Newly diagnosed with LVSD with LVEF 20% with what appears to be severe AS. Currently her right sided filling pressures are low and she is hypotensive requiring vasopressors although with wide pulse pressures. Her output is adequate on current dose of dobutamine. CRISSY on CKD.    EKG: afib HR 94, RBBB  TTE 7/14 LVEF 24%, takotsubo, Stage III DD, nl RV size with RVSD, mod MR, severe AS, mod TR, est RVSP 52 mm Hg  LHC 7/12 minor luminal irregularities    7/14: CVP 1, central sat 58%, CI 2.2

## 2021-07-14 NOTE — DIETITIAN INITIAL EVALUATION ADULT. - OTHER INFO
Admits to decreased appetite/Po intake in-house; consuming <60% at meals. Pt denies chewing/swallowing issues, however admits to SOB and fatigue - amenable to trial mechanical soft diet. Denies diarrhea/constipation or other signs of acute GI distress at this time. Last BM 7/12 - no bowel regimen ordered. Notes nausea with last episode of emesis 2 days ago     Pt admits her weight has been stable around 130Ibs, however has noticed progressive muscle/fat loss and feels "smaller" in her face and shoulders  Dosing weight 7/12: 130.2Ibs; 7/14 bedscale weight: 139.1Ibs    A1c 5.7% (7/12), no documented Hx of DM/Pre-DM    Noted pt's increased nutritional needs at this time and ways to optimize nutritional intake while with decreased appetite. Discussed consuming small frequent meals, keeping nonperishable food items at bedside to consume throughout the day, and drinking oral nutrition supplement between meals. Amenable to receive oral nutrition supplement in-house

## 2021-07-14 NOTE — PROGRESS NOTE ADULT - ASSESSMENT
***INCOMPLETE*** ====================ASSESSMENT ==============  Late presentation inferolateral STEMI  CRISSY likely in setting of BLANCA      Plan:  ====================== NEUROLOGY=====================  AOx3  - no acute issues   - tylenol PRN for analgesia/fever   - Will increase Melatonin to 5mg HS    acetaminophen   Tablet .. 650 milliGRAM(s) Oral every 6 hours PRN Temp greater or equal to 38C (100.4F), Mild Pain (1 - 3), Moderate Pain (4 - 6)  melatonin 3 milliGRAM(s) Oral at bedtime    ==================== RESPIRATORY======================  Stable on RA, SpO2 96%  - continue pulse ox monitoring, follow ABGs     ====================CARDIOVASCULAR==================  Likely Takotsubo Cardiomyopathy vs Myopericarditis c/b cardiogenic shock, Severe AS  - LHC revealing clear cors  - cont ASA statin Hold AV hector blockes  - trend CE   - Will place Left IJ Cordis, PAC/RHC for additional objective data in the setting of undifferentiated shock      Severe AS  - structural eval noted  - cont     aspirin enteric coated 81 milliGRAM(s) Oral daily  atorvastatin 80 milliGRAM(s) Oral at bedtime  DOBUTamine Infusion 2.5 MICROgram(s)/kG/Min (4.43 mL/Hr) IV Continuous <Continuous>  norepinephrine Infusion 0.05 MICROgram(s)/kG/Min (5.54 mL/Hr) IV Continuous <Continuous>    ===================HEMATOLOGIC/ONC ===================  No acute issues   - monitor H&H/Plts     ===================== RENAL =========================  CRISSY likely in setting of BLANCA and low flow, High Anion Gap Metabolic Acidemia, likely related to CRISSY  - continue dobutamine  - SCr baseline appears ~1.4 ()  - Avoid nephrotoxins, renally dose meds  - Renal consult noted  - Continue with 650mg Sodium Bicarbonate q 8hours    ==================== GASTROINTESTINAL===================  Continue DASH diet     =======================    ENDOCRINE  =====================  No acute issues   - continue monitoring blood glucose closely for need to initiate sliding scale     ========================INFECTIOUS DISEASE================  Afebrile, WBC within normal limits  - Continue trending WBC and monitoring fever curve     RIJ TLC ( - ), Left Acillary Arterial Catheter ( - ), [place] Catheter      Patient requires continuous monitoring with bedside rhythm monitoring, pulse ox monitoring, and intermittent blood gas analysis. Care plan discussed with ICU care team. Patient remained critical and at risk for life threatening decompensation.  Patient seen, examined and plan discussed with CCU team during rounds.    Ger Bhatia PA-C  CICU PA

## 2021-07-14 NOTE — DIETITIAN INITIAL EVALUATION ADULT. - FLUID ACCUMULATION
"Name: Robert Jessica  Clinic Number: [id number]  Date of Treatment: 08/01/2018  Diagnosis:   Encounter Diagnoses   Name Primary?    Acute right-sided low back pain with right-sided sciatica     Weakness of lower extremity, unspecified laterality     Right sciatic nerve pain        Time in: 0905  Time Out: 1000  Total Treatment Time: 51  Group Time: 0      Subjective:    Robert reports improvement of symptoms.  Patient reports their pain to be 2/10 on a 0-10 scale with 0 being no pain and 10 being the worst pain imaginable.  Pt reported that he feels that he can manage his symptoms at home.      Objective    Patient received individual therapy to increase strength, ROM, flexibility, posture and core stabilization with 0 patients with activities as follows:     Robert received therapeutic exercises to develop strength, ROM, flexibility, posture and core stabilization for 51 minutes including:    Gastroc stretch 3/30" over 1/2 roll   HSS 3/30" seated   Piriformis stretch in sitting 3/30"   Clamshells with GTB x 30   Dying bug with ankle DF/PF x 30 ea, knee flex/ext x 30   Instructed pt in ITB stretch 3x30s ea in supine.      Shuttle B squat w/62# x 30     Written Home Exercises Provided: Reviewed.    Pt demo good understanding of the education provided. Robert demonstrated good return demonstration of activities.     Assessment:   Doing well with exercises.  Pt ready for discharge    Pt will no longer benefit from skilled PT intervention. Medical Necessity is no longer demonstrated; therefore, no further need for PT services indicated.    Patient has made good progress towards established goals.    New/Revised goals: N/A      Plan:  Discontinue PT at this time due to having met goals.  .   "
none

## 2021-07-14 NOTE — PROCEDURE NOTE - NSPROCNAME_GEN_A_CORE
Central Line Insertion
Arterial Puncture/Cannulation
General
Central Line Insertion
Arterial Puncture/Cannulation

## 2021-07-14 NOTE — PROCEDURE NOTE - NSPOSTCAREGUIDE_GEN_A_CORE
Verbal/written post procedure instructions were given to patient/caregiver/Care for catheter as per unit/ICU protocols
Care for catheter as per unit/ICU protocols
Verbal/written post procedure instructions were given to patient/caregiver/Care for catheter as per unit/ICU protocols
Care for catheter as per unit/ICU protocols
Care for catheter as per unit/ICU protocols

## 2021-07-14 NOTE — PROCEDURE NOTE - NSINFORMCONSENT_GEN_A_CORE
Benefits, risks, and possible complications of procedure explained to patient/caregiver who verbalized understanding and gave verbal consent.
This was an emergent procedure.

## 2021-07-14 NOTE — DIETITIAN INITIAL EVALUATION ADULT. - CHIEF COMPLAINT
86F , PMH of afib on coumadin, AS, presented with 2 wks of intermittent chest pain, likely late presentation MI, s/p LHC with normal coronaries on 7/12. TTE with EF of 25%. Worsening renal function.

## 2021-07-14 NOTE — PROCEDURE NOTE - NSPOSTPRCRAD_GEN_A_CORE
central line located in the superior vena cava/no pneumothorax/post-procedure radiography performed
central line located in the superior vena cava/no pneumothorax/post-procedure radiography performed

## 2021-07-14 NOTE — DIETITIAN INITIAL EVALUATION ADULT. - ADD RECOMMEND
1. Recommend Low sodium, mechanical soft - pt requesting diet texture downgrade. 2. Recommend Ensure Enlive x2 daily in efforts to optimize PO intake. 3. Consider addition of Multivitamin supplementation pending no existing contraindications. 4. Monitor PO intake/tolerance, weights, labs, hydration status, bowels, and skin integrity.

## 2021-07-15 NOTE — PROGRESS NOTE ADULT - ATTENDING COMMENTS
Patient seen and examined. Agree with assessment and plan as outlined above.  85 yo F with HTN, chronic atrial fibrillation with possible stress-induced cardiomyopathy with severe aortic stenosis. Patient in cardiogenic shock status-post swan-jenna catheter. CVP<5 today on rounds with PCWP 15-20. Patient requiring low dose levophed and dobutamine infusion. Continue low dose inotrope and pressor support. Gentle IVFs for low cvp. Appreciate structural heart evaluation. Consider BAV. Creatinine slightly improved with continue urine output.

## 2021-07-15 NOTE — PROGRESS NOTE ADULT - ATTENDING COMMENTS
Briefly, 85 yo F w/ h/o HTN and paroxysmal Afib on coumadin who presented with chest pain and syncope at home to [place] on , found to have trop I>40; underwent CTA negative for PE and transferred to [facility]. Initially normotensive and labs revealed hsTrop >10,000, lactate 2.5. EKG with Afib with RBBB, with Q waves on lateral leads. Bedside TTE with reduced EF ~15-20%. Repeat CT chest angio to r/o aortic dissection was negative for significant dissection. Underwent LHC  which showed non-obstructive coronaries. TTE with severe LV dysfunction (segmental with basal contractility and apical akinesis) with possibly severe AS (mean gradient 30, TINY 0.5). Became hypotensive on  (notably received metoprolol day prior). Started on /levophed and received IVF. On exam, frail appearing, NAD, JVP approx 6 cm, grade II/VI systolic murmur, nontender abdomen, no pedal edema. Labs reviewed - Hb 10 (stable)), BUN/Cr 47/2.73 (maxwell 1.8), lactate negative, central sat 67% (estimated CI 3.1). Unclear etiology of severe LV dysfunction with elevated troponins in absence of non-obstructive coronaries - possibly Takotsubo's vs. embolic event which recanalized  - c/w ; wean levophed as tolerated for SBP 90  - goal CVP 6-8; hydrate as needed  - possible plan for BAV; followed by structural   - prognosis guarded

## 2021-07-15 NOTE — PROGRESS NOTE ADULT - SUBJECTIVE AND OBJECTIVE BOX
[facility]   [phone]  RENAL FOLLOW UP NOTE  --------------------------------------------------------------------------------  Chief Complaint:    24 hour events/subjective:    seen earlier   states still with abd discomfort no chest pain   + RENEE/sob     PAST HISTORY  --------------------------------------------------------------------------------  No significant changes to PMH, PSH, FHx, SHx, unless otherwise noted    ALLERGIES & MEDICATIONS  --------------------------------------------------------------------------------  Allergies    adhesives (Rash)  No Known Drug Allergies    Intolerances      Standing Inpatient Medications  aspirin enteric coated 81 milliGRAM(s) Oral daily  atorvastatin 80 milliGRAM(s) Oral at bedtime  chlorhexidine 2% Cloths 1 Application(s) Topical daily  DOBUTamine Infusion 5 MICROgram(s)/kG/Min IV Continuous <Continuous>  heparin  Infusion 800 Unit(s)/Hr IV Continuous <Continuous>  melatonin 5 milliGRAM(s) Oral at bedtime  multivitamin 1 Tablet(s) Oral daily  norepinephrine Infusion 0.05 MICROgram(s)/kG/Min IV Continuous <Continuous>  sodium bicarbonate 650 milliGRAM(s) Oral three times a day    PRN Inpatient Medications  acetaminophen   Tablet .. 650 milliGRAM(s) Oral every 6 hours PRN  sodium chloride 0.9% lock flush 10 milliLiter(s) IV Push every 1 hour PRN      REVIEW OF SYSTEMS  --------------------------------------------------------------------------------    Gen: denies chills,  CVS: denies chest pain/palpitations  Resp: +SOB/Cough  GI: Denies N/V/Abd pain  : Denies dysuria    VITALS/PHYSICAL EXAM  --------------------------------------------------------------------------------  T(C): 37.6 (07-15-21 @ 16:00), Max: 37.8 (07-14-21 @ 19:00)  HR: 98 (07-15-21 @ 17:30) (84 - 114)  BP: 93/55 (07-15-21 @ 08:00) (93/55 - 93/55)  RR: 24 (07-15-21 @ 11:50) (18 - 26)  SpO2: 82% (07-15-21 @ 17:30) (82% - 100%)  Wt(kg): --        07-14-21 @ 07:01  -  07-15-21 @ 07:00  --------------------------------------------------------  IN: 1104.9 mL / OUT: 940 mL / NET: 164.9 mL    07-15-21 @ 07:01  -  07-15-21 @ 17:47  --------------------------------------------------------  IN: 1499.6 mL / OUT: 280 mL / NET: 1219.6 mL      Physical Exam:  	    Gen: pale appearing   	 - JVD  	Pulm: decrease bs  no rales or ronchi or wheezing  	CV: RRR, S1S2; no rub III/VI M   	Abd: +BS, soft, nontender/nondistended  	: No suprapubic tenderness  	UE: Warm, no cyanosis  no clubbing,  no edema  	LE: Warm, no cyanosis  no clubbing, no edema  	Neuro: alert and oriented. speech coherent       LABS/STUDIES  --------------------------------------------------------------------------------              10.2   9.17  >-----------<  156      [07-15-21 @ 01:38]              30.5     134  |  107  |  47  ----------------------------<  129      [07-15-21 @ 01:38]  4.4   |  18  |  2.73        Ca     8.8     [07-15-21 @ 01:38]      Mg     2.1     [07-15-21 @ 01:38]      Phos  2.6     [07-15-21 @ 01:38]    TPro  4.9  /  Alb  2.7  /  TBili  1.3  /  DBili  x   /  AST  59  /  ALT  43  /  AlkPhos  62  [07-15-21 @ 01:38]    PT/INR: PT 12.1 , INR 1.01       [07-15-21 @ 01:38]  PTT: 80.9       [07-15-21 @ 16:34]          [07-14-21 @ 01:22]    Creatinine Trend:  SCr 2.73 [07-15 @ 01:38]  SCr 2.99 [07-14 @ 20:10]  SCr 3.16 [07-14 @ 10:15]  SCr 3.27 [07-14 @ 01:22]  SCr 3.01 [07-13 @ 18:04]                  PTH -- (Ca 9.2)      [07-13-21 @ 23:49]   193  Vitamin D (25OH) 47.6      [07-13-21 @ 23:49]  TSH 2.32      [07-12-21 @ 08:24]  Lipid: chol 105, TG 89, HDL 51, LDL --      [07-12-21 @ 08:26]

## 2021-07-15 NOTE — PROGRESS NOTE ADULT - ATTENDING COMMENTS
Patient feels mildly improved compared to the prior day.  Was previously experiencing some abdominal discomfort/nausea.  Does have some right lower quadrant abdominal tenderness to palpation.  Due to low CVP she was given 250 cc normal bolus earlier today.  Continues to be on dobutamine at 5 and Levophed.  Physical examination agree with above.    The patient has a very guarded prognosis.  Consideration should be made to assist in the patient's medical management for potential retrograde aortic balloon valvuloplasty.  If however, the patient clinically improves over the ensuing days with stabilization of her kidney function and able to wean off pressors/inotropes consideration could be made for performing a TAVR CTA on 7/19/2021 with plan for TAVR procedure to be performed later in the week.  This was reviewed with the patient.  She is agreeable with the plan.    All questions and concerns of the patient were addressed.

## 2021-07-15 NOTE — PROGRESS NOTE ADULT - ASSESSMENT
87 yo lady PMHx of HTN and paroxysmal Afib on coumadin who was transferred to [facility] ED from [place] ED where she presented with 2 week hx of intermittent chest pain, with acute exacerbation of chest pain radiating to back  associated with single episode of syncope at home on day of admission. 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      1- CKD III/IV with CRISSY   2- a fib  3- chest pains   4- CHF  5- aortic stenosis   6- hypercalcemia       crissy in setting of contrast nephropathy  pt also with severe AS   dc ivf   dobutamine support  structural heart team eval for TAVR   strict I/O  suspect shpt due to underlying ckd   d/w CCU team when seen earlier.

## 2021-07-15 NOTE — PROGRESS NOTE ADULT - SUBJECTIVE AND OBJECTIVE BOX
ANDRE DE LUNA  MRN-[id number]  Patient is a 86y old  Female who presents with a chief complaint of late presentation MI (2021 20:19)    HPI:  87 yo lady PMHx of HTN and paroxysmal Afib on coumadin who was transferred to [facility] ED from [place] ED where she presented with 2 week hx of intermittent atypical chest pain, with acute exacerbation of chest pain radiating to back for the last 2 days, also associated with single episode of syncope at home today.     In the OSH ED, vitals - HR 80-90s Afib, -130s, RR 20, and SpO2 98% on 2L NC. Unremarkable exam reported. Labs c/w CRISSY on CKD (Cr 1.9 compared to 1.4 in 2019), with trop >40, pBNP 8543, and CKMB 94.5. CT chest angio (90cc contrast) without evidence of PE but inconclusive study on aortic dissection. Was transferred to [facility] ED for further management.    At the [facility] ED, vitals - HR 90-100s Afib, /60 at the bedside, RR 20, and SpO2 95% on room air. On physical exam, with JVD ~8cm, without pertinent cardiac or respiratory exam findings, without associated LE edema. Labs c/w CRISSY, hsTrop >10,000, lactate 2.5. EKG with Afib with RBBB, with Q waves on lateral leads. Bedside TTE with reduced EF ~15-20%. Repeat CT chest angio to r/o aortic dissection was negative for significant dissection.    Admitted to CICU for close monitoring, chest pain management on nitro gtt, pending [facility] in AM      (2021 00:11)      24 HOUR EVENTS: No major events. Urine output improved. maintained on Dobutamine 5mcg/kg/min     REVIEW OF SYSTEMS:    CONSTITUTIONAL: No weakness, fevers or chills  EYES/ENT: No visual changes;  No vertigo or throat pain   NECK: No pain or stiffness  RESPIRATORY: No cough, wheezing, hemoptysis; No shortness of breath  CARDIOVASCULAR: No chest pain or palpitations  GASTROINTESTINAL: No abdominal or epigastric pain. No nausea, vomiting, or hematemesis; No diarrhea or constipation. No melena or hematochezia.  GENITOURINARY: No dysuria, frequency or hematuria  NEUROLOGICAL: No numbness or weakness  SKIN: No itching, rashes      ICU Vital Signs Last 24 Hrs  T(C): 37.2 (15 Jul 2021 03:00), Max: 37.8 (2021 19:00)  T(F): 99 (15 Jul 2021 03:00), Max: 100 (2021 19:00)  HR: 94 (15 Jul 2021 06:30) (84 - 114)  BP: --  BP(mean): --  ABP: 102/52 (15 Jul 2021 06:30) (88/42 - 112/62)  ABP(mean): 72 (15 Jul 2021 06:30) (58 - 82)  RR: 24 (15 Jul 2021 06:30) (16 - 33)  SpO2: 97% (15 Jul 2021 06:30) (96% - 100%)      CVP(mm Hg): 5 (07-15-21 @ 06:30) (-2 - 10)  CO: --  CI: --  PA: 43/15 (07-15-21 @ 06:30) (32/10 - 52/19)  PA(mean): 26 (07-15-21 @ 06:30) (19 - 34)  PA(direct): --  PCWP: --  LA: --  RA: --  SVR: --  SVRI: --  PVR: --  PVRI: --  I&O's Summary    2021 07:01  -  15 Jul 2021 07:00  --------------------------------------------------------  IN: 1104.9 mL / OUT: 940 mL / NET: 164.9 mL        CAPILLARY BLOOD GLUCOSE    CAPILLARY BLOOD GLUCOSE      PHYSICAL EXAM:  GENERAL: No acute distress, well-developed  HEAD:  Atraumatic, Normocephalic  EYES: PERRLA, conjunctiva and sclera clear  NECK: Supple, no lymphadenopathy, no JVD  CHEST/LUNG: CTAB; No wheezes, rales, or rhonchi  HEART: Regular rate and irregular rhythm Normal S1/S2. No murmurs, rubs, or gallops  ABDOMEN: Soft, non-tender, non-distended; no organomegaly  EXTREMITIES:  2+ peripheral pulses radial/DP/PT b/l, No clubbing, cyanosis, or edema  NEUROLOGY: A&O x4, no focal deficits  SKIN: No rashes or lesions. Left AC ecchymosis     ============================I/O===========================   I&O's Detail    2021 07:01  -  15 Jul 2021 07:00  --------------------------------------------------------  IN:    DOBUTamine: 22 mL    DOBUTamine: 177.8 mL    Heparin: 30 mL    IV PiggyBack: 110 mL    Norepinephrine: 405.1 mL    Oral Fluid: 360 mL  Total IN: 1104.9 mL    OUT:    Indwelling Catheter - Urethral (mL): 940 mL  Total OUT: 940 mL    Total NET: 164.9 mL        ============================ LABS =========================                        10.2   9.17  )-----------( 156      ( 15 Jul 2021 01:38 )             30.5     -15    134<L>  |  107  |  47<H>  ----------------------------<  129<H>  4.4   |  18<L>  |  2.73<H>    Ca    8.8      15 Jul 2021 01:38  Phos  2.6     07-15  Mg     2.1     -15    TPro  4.9<L>  /  Alb  2.7<L>  /  TBili  1.3<H>  /  DBili  x   /  AST  59<H>  /  ALT  43  /  AlkPhos  62  07-15    Troponin T, High Sensitivity Result: >98826 ng/L (21 @ 01:22)  Troponin T, High Sensitivity Result: >86053 ng/L (21 @ 18:04)  Troponin T, High Sensitivity Result: 8424 ng/L (21 @ 12:42)    CKMB Units: 22.2 ng/mL (21 @ 01:22)  CKMB Units: 26.6 ng/mL (21 @ 18:04)  CKMB Units: 73.5 ng/mL (21 @ 12:42)    Creatine Kinase, Serum: 299 U/L (21 @ 01:22)  Creatine Kinase, Serum: 359 U/L (21 @ 18:04)  Creatine Kinase, Serum: 745 U/L (21 @ 12:42)    CPK Mass Assay %: 7.4 % (21 @ 01:22)  CPK Mass Assay %: 7.4 % (21 @ 18:04)  CPK Mass Assay %: 9.9 % (21 @ 12:42)        LIVER FUNCTIONS - ( 15 Jul 2021 01:38 )  Alb: 2.7 g/dL / Pro: 4.9 g/dL / ALK PHOS: 62 U/L / ALT: 43 U/L / AST: 59 U/L / GGT: x           PT/INR - ( 15 Jul 2021 01:38 )   PT: 12.1 sec;   INR: 1.01 ratio         PTT - ( 15 Jul 2021 01:38 )  PTT:30.3 sec  ABG - ( 15 Jul 2021 01:35 )  pH, Arterial: 7.43  pH, Blood: x     /  pCO2: 32    /  pO2: 128   / HCO3: 21    / Base Excess: -2.4  /  SaO2: 99                Blood Gas Arterial, Lactate: 0.9 mmol/L (07-15-21 @ 01:35)  Blood Gas Arterial, Lactate: 1.3 mmol/L (21 @ 10:17)  Blood Gas Venous - Lactate: 1.3 mmoL/L (21 @ 10:11)  Blood Gas Venous - Lactate: 1.2 mmoL/L (21 @ 03:02)  Blood Gas Venous - Lactate: 2.0 mmoL/L (21 @ 20:41)  Lactate, Blood: 1.0 mmol/L (21 @ 18:04)      ======================Micro/Rad/Cardio=================  Telemetry: Reviewed   EKG: Reviewed  CXR: Reviewed  Culture: Reviewed   Echo:   Cath: Cardiac Cath Lab - Adult:   [facility]  Department of Cardiology  [address] [place] [address]  [phone]  Cath Lab Report -- Comprehensive Report  Patient: ANDRE DE LUNA  Study date: 2021  Account number: [id number]  MR number:[id number]  : 1935  Gender: Female  Race: W  Case Physician(s):  Jack Chatterjee M.D.  Fellow:  HI Mckenna M.D.  Referring Physician:  Arsenio Hebert M.D.  INDICATIONS: Initial NSTEMI.  HISTORY: Aortic valve: history of severe stenosis. The patient has  hypertension.  PROCEDURE:  --  Left coronary angiography.  --  Right coronary angiography.  --  Sonosite - Diagnostic.  TECHNIQUE: The risks and alternatives of the procedures and conscious  sedation were explained to the patient and informed consent was obtained.  Cardiac catheterization performed electively.  Local anesthetic given. Right femoral artery access. Left coronary artery  angiography. The vessel was injected utilizing a catheter. Right coronary  artery angiography. The vessel was injected utilizing a catheter. Sonosite  - Diagnostic. RADIATION EXPOSURE: 1.4 min.  CONTRAST GIVEN: Omnipaque 19 ml.  MEDICATIONS GIVEN: Midazolam, 0.5 mg, IV. Fentanyl, 25 mcg, IV.  CORONARY VESSELS: The coronary circulation is right dominant.  LM:   --  LM: Angiography showed minor luminal irregularities with no flow  limiting lesions.  LAD:   --  LAD: Angiography showed minor luminal irregularities with no  flow limiting lesions.  CX:   --  Circumflex: Angiography showed minor luminal irregularities with  no flow limiting lesions.  RCA:   --  RCA: Angiography showed minor luminal irregularities with no  flow limiting lesions.  COMPLICATIONS: There were no complications.  DIAGNOSTIC RECOMMENDATIONS: The patient should continue with the present  medications. Evalaution for TAVR  Prepared and signed by  Jack Chatterjee M.D.  Signed 2021 15:18:45  HEMODYNAMIC TABLES  Outputs:  Baseline  Outputs:  -- CALCULATIONS[age]  Outputs:  -- CALCULATIONS: Body Surface Area: 1.66  Outputs:  -- CALCULATIONS: Height in cm: 167.00  Outputs:  -- CALCULATIONS: Sex: Female  Outputs:  -- CALCULATIONS: Weight in k.00  Outputs:  -- OUTPUTS: O2 consumption: 207.66  Outputs:  -- OUTPUTS: Vo2 Indexed: 125.00 (21 @ 13:23)    ======================================================  PAST MEDICAL & SURGICAL HISTORY:  Afib    Hypertension    Hyperparathyroidism    Osteoporosis    Dyslipidemia    Macular degeneration    Temporal arteritis    Spinal stenosis    Osteoarthritis    Sternal fracture      History of cholecystectomy    H/O nasal septoplasty    History of lumbar laminectomy  with microdiskectomy    H/O parathyroidectomy    H/O total knee replacement, left    History of temporal arteritis  b/l surgical repair    H/O bilateral cataract extraction

## 2021-07-15 NOTE — PROGRESS NOTE ADULT - ASSESSMENT
Ms Sharpe is an 85y/o female with PMHx of HTN and Afib (on Coumadin) admitted with syncope and chest pain.  Acute on chronic systolic heart failure.    NYHA II  STS risk for AVR: 11.76%    TYREE done, shows severe AS  - LHC done, shows minor CAD, EF 25%  - further TAVR workup will include carotid dopplers, PFT's and cardiac CTA  -request the carotids and PFTs but hold off on the CTA due to her contrast induced nephropathy  - will consider alternative of balloon aortic valvuloplasty as recent echo shows critical AS and EF of 25%

## 2021-07-15 NOTE — PROGRESS NOTE ADULT - SUBJECTIVE AND OBJECTIVE BOX
ANDRE DE LUNA  MRN-[id number]  Patient is a 86y old  Female who presents with a chief complaint of late presentation MI (15 Jul 2021 17:47)    HPI:  85 yo lady PMHx of HTN and paroxysmal Afib on coumadin who was transferred to [facility] ED from [place] ED where she presented with 2 week hx of intermittent atypical chest pain, with acute exacerbation of chest pain radiating to back for the last 2 days, also associated with single episode of syncope at home today.     In the OSH ED, vitals - HR 80-90s Afib, -130s, RR 20, and SpO2 98% on 2L NC. Unremarkable exam reported. Labs c/w CRISSY on CKD (Cr 1.9 compared to 1.4 in 2019), with trop >40, pBNP 8543, and CKMB 94.5. CT chest angio (90cc contrast) without evidence of PE but inconclusive study on aortic dissection. Was transferred to [facility] ED for further management.    At the [facility] ED, vitals - HR 90-100s Afib, /60 at the bedside, RR 20, and SpO2 95% on room air. On physical exam, with JVD ~8cm, without pertinent cardiac or respiratory exam findings, without associated LE edema. Labs c/w CRISSY, hsTrop >10,000, lactate 2.5. EKG with Afib with RBBB, with Q waves on lateral leads. Bedside TTE with reduced EF ~15-20%. Repeat CT chest angio to r/o aortic dissection was negative for significant dissection.    Admitted to CICU for close monitoring, chest pain management on nitro gtt, pending [facility] in AM      (2021 00:11)      Hospital Course:   transferred to CCU for further management     24 HOUR EVENTS:    REVIEW OF SYSTEMS:   Constitutional: No weakness, fevers, or chills  Eyes/ENT: No visual changes  Respiratory: No cough, wheezing, hemoptysis  Cardiovascular: No chest pain, no palpitations  Gastrointestinal: No abdominal pain. No nausea, vomiting, hematemesis.   Genitourinary: No dysuria  Neurological: No numbness, no weakness  Skin: No itching, rashes    ICU Vital Signs Last 24 Hrs  T(C): 37.7 (15 Jul 2021 19:00), Max: 37.7 (15 Jul 2021 19:00)  T(F): 99.9 (15 Jul 2021 19:00), Max: 99.9 (15 Jul 2021 19:00)  HR: 104 (15 Jul 2021 22:00) (84 - 114)  BP: 93/55 (15 Jul 2021 08:00) (93/55 - 93/55)  BP(mean): 64 (15 Jul 2021 08:00) (64 - 64)  ABP: 96/50 (15 Jul 2021 22:00) (78/38 - 158/134)  ABP(mean): 70 (15 Jul 2021 22:00) (52 - 226)  RR: 20 (15 Jul 2021 22:00) (16 - 28)  SpO2: 97% (15 Jul 2021 22:00) (82% - 100%)      CVP(mm Hg): 5 (07-15-21 @ 22:00) (-4 - 11)  PA: 52/16 (07-15-21 @ 22:00) (27/2 - 55/18)  PA(mean): 30 (07-15-21 @ 22:00) (11 - 34)    I&O's Summary    2021 07:01  -  15 Jul 2021 07:00  --------------------------------------------------------  IN: 1104.9 mL / OUT: 940 mL / NET: 164.9 mL    15 Jul 2021 07:01  -  15 Jul 2021 22:37  --------------------------------------------------------  IN: 1667.1 mL / OUT: 390 mL / NET: 1277.1 mL      PHYSICAL EXAM:   General: No acute distress  Eyes: EOMI, PERRLA, conjunctiva and sclera clear  Chest/Lung: CTAB, no wheezes, rales, or rhonchi  Heart: Regular rate, regular rhythm. Normal S1/S2. No murmurs, rubs, or gallops.  Abdomen: Soft, nontender, nondistended. Normal bowel sounds.  Extremities: 2+ peripheral pulses B/L. No clubbing, cyanosis, or edema.  Neurology: A&O x3, no focal deficits  Skin: No rashes or lesions  ============================I/O===========================   I&O's Detail    2021 07:  -  15 Jul 2021 07:00  --------------------------------------------------------  IN:    DOBUTamine: 22 mL    DOBUTamine: 177.8 mL    Heparin: 30 mL    IV PiggyBack: 110 mL    Norepinephrine: 405.1 mL    Oral Fluid: 360 mL  Total IN: 1104.9 mL    OUT:    Indwelling Catheter - Urethral (mL): 940 mL  Total OUT: 940 mL    Total NET: 164.9 mL      15 Jul 2021 07:  -  15 Jul 2021 22:37  --------------------------------------------------------  IN:    DOBUTamine: 133.5 mL    Heparin: 86 mL    Heparin: 40 mL    IV PiggyBack: 500 mL    Norepinephrine: 427.6 mL    Oral Fluid: 480 mL  Total IN: 1667.1 mL    OUT:    Indwelling Catheter - Urethral (mL): 390 mL  Total OUT: 390 mL    Total NET: 1277.1 mL        ============================ LABS =========================                        10.2   9.17  )-----------( 156      ( 15 Jul 2021 01:38 )             30.5     07-15    134<L>  |  107  |  47<H>  ----------------------------<  129<H>  4.4   |  18<L>  |  2.73<H>    Ca    8.8      15 Jul 2021 01:38  Phos  2.6     07-15  Mg     2.1     07-15    TPro  4.9<L>  /  Alb  2.7<L>  /  TBili  1.3<H>  /  DBili  x   /  AST  59<H>  /  ALT  43  /  AlkPhos  62  07-15    Troponin T, High Sensitivity Result: >81135 ng/L (21 @ 01:22)  Troponin T, High Sensitivity Result: >96085 ng/L (21 @ 18:04)    CKMB Units: 22.2 ng/mL (21 @ 01:22)  CKMB Units: 26.6 ng/mL (21 @ 18:04)    Creatine Kinase, Serum: 299 U/L (21 @ 01:22)  Creatine Kinase, Serum: 359 U/L (21 @ 18:04)    CPK Mass Assay %: 7.4 % (21 @ 01:22)  CPK Mass Assay %: 7.4 % (21 @ 18:04)        LIVER FUNCTIONS - ( 15 Jul 2021 01:38 )  Alb: 2.7 g/dL / Pro: 4.9 g/dL / ALK PHOS: 62 U/L / ALT: 43 U/L / AST: 59 U/L / GGT: x           PT/INR - ( 15 Jul 2021 01:38 )   PT: 12.1 sec;   INR: 1.01 ratio         PTT - ( 15 Jul 2021 16:34 )  PTT:80.9 sec  ABG - ( 15 Jul 2021 01:35 )  pH, Arterial: 7.43  pH, Blood: x     /  pCO2: 32    /  pO2: 128   / HCO3: 21    / Base Excess: -2.4  /  SaO2: 99                Blood Gas Arterial, Lactate: 0.9 mmol/L (07-15-21 @ 01:35)  Blood Gas Arterial, Lactate: 1.3 mmol/L (21 @ 10:17)  Blood Gas Venous - Lactate: 1.3 mmoL/L (21 @ 10:11)  Blood Gas Venous - Lactate: 1.2 mmoL/L (21 @ 03:02)  Blood Gas Venous - Lactate: 2.0 mmoL/L (21 @ 20:41)  Lactate, Blood: 1.0 mmol/L (21 @ 18:04)      ======================Micro/Rad/Cardio=================  Telemetry: Reviewed   EKG: Reviewed  CXR: Reviewed  Culture: Reviewed   Echo: Reviewed  Cath: Reviewed  ======================================================  PAST MEDICAL & SURGICAL HISTORY:  Afib    Hypertension    Hyperparathyroidism    Osteoporosis    Dyslipidemia    Macular degeneration    Temporal arteritis    Spinal stenosis    Osteoarthritis    Sternal fracture  2010    History of cholecystectomy    H/O nasal septoplasty    History of lumbar laminectomy  with microdiskectomy    H/O parathyroidectomy    H/O total knee replacement, left    History of temporal arteritis  b/l surgical repair    H/O bilateral cataract extraction      ====================ASSESSMENT ==============  Late presentation inferolateral STEMI  CRISSY likely in setting of BLANCA  Anemia     Plan:  ====================== NEUROLOGY=====================  AOx3  - no acute issues   - tylenol PRN for analgesia/fever   - sleep regimen with melatonin     acetaminophen   Tablet .. 650 milliGRAM(s) Oral every 6 hours PRN Temp greater or equal to 38C (100.4F), Mild Pain (1 - 3), Moderate Pain (4 - 6)  melatonin 5 milliGRAM(s) Oral at bedtime    ==================== RESPIRATORY======================  Stable on RA, SpO2 97%  - continue pulse ox monitoring, follow ABGs     ====================CARDIOVASCULAR==================  Late presentation inferolateral STEMI c/b cardiogenic shock  - LHC revealing clear cors  - presentation possibly 2/2 tako vs severe AS vs myopericarditis   - cont ASA, statin   - c/w  and levo   - continue invasive hemodynamic monitoring via TLC and Ana Laura   - monitor CVPs, VBGs and uptitrate inotrope as needed   - TTE : EF 24%, mod MR, LV dysfunction, decreased RV function   - hep gtt     Severe AS  - structural team following    aspirin enteric coated 81 milliGRAM(s) Oral daily  atorvastatin 80 milliGRAM(s) Oral at bedtime  DOBUTamine Infusion 5 MICROgram(s)/kG/Min (8.87 mL/Hr) IV Continuous <Continuous>  norepinephrine Infusion 0.05 MICROgram(s)/kG/Min (5.54 mL/Hr) IV Continuous <Continuous>    ===================HEMATOLOGIC/ONC ===================  Anemia  - monitor H&H/Plts   - heparin gtt for STEMI     heparin  Infusion 800 Unit(s)/Hr (8 mL/Hr) IV Continuous <Continuous>    ===================== RENAL =========================  CRISSY likely in setting of BLANCA and low flow  - SCr baseline appears ~1.4 (2019)  - Avoid nephrotoxins, renally dose meds  - Renal consult noted    ==================== GASTROINTESTINAL===================  - Tolerating mechanical soft diet     multivitamin 1 Tablet(s) Oral daily  sodium bicarbonate 650 milliGRAM(s) Oral three times a day  sodium chloride 0.9% Bolus 250 milliLiter(s) IV Bolus once  sodium chloride 0.9% lock flush 10 milliLiter(s) IV Push every 1 hour PRN Pre/post blood products, medications, blood draw, and to maintain line patency    =======================    ENDOCRINE  =====================  No acute issues   - continue monitoring blood glucose closely for need to initiate sliding scale     ========================INFECTIOUS DISEASE================  Temp 99.9F. , WBC WNL  - Continue trending WBC and monitoring fever curve       Patient requires continuous monitoring with bedside rhythm monitoring, pulse ox monitoring, and intermittent blood gas analysis. Care plan discussed with ICU care team. Patient remained critical and at risk for life threatening decompensation.  Patient seen, examined and plan discussed with CCU team during rounds.     I have personally provided 35 minutes of critical care time excluding time spent on separate procedures.    By signing my name below, I, Kerri Gordon, attest that this documentation has been prepared under the direction and in the presence of Mallika Thomas NP   Electronically signed: Timothy Ayers, 07-15-21 @ 22:37    I, Mallika Thomas, personally performed the services described in this documentation. all medical record entries made by the timothy were at my direction and in my presence. I have reviewed the chart and agree that the record reflects my personal performance and is accurate and complete  Electronically signed: Mallika Thomas NP        ANDRE DE LUNA  MRN-[id number]  Patient is a 86y old  Female who presents with a chief complaint of late presentation MI (15 Jul 2021 17:47)    HPI:  85 yo lady PMHx of HTN and paroxysmal Afib on coumadin who was transferred to [facility] ED from [place] ED where she presented with 2 week hx of intermittent atypical chest pain, with acute exacerbation of chest pain radiating to back for the last 2 days, also associated with single episode of syncope at home today.     In the OSH ED, vitals - HR 80-90s Afib, -130s, RR 20, and SpO2 98% on 2L NC. Unremarkable exam reported. Labs c/w CRISSY on CKD (Cr 1.9 compared to 1.4 in 2019), with trop >40, pBNP 8543, and CKMB 94.5. CT chest angio (90cc contrast) without evidence of PE but inconclusive study on aortic dissection. Was transferred to [facility] ED for further management.    At the [facility] ED, vitals - HR 90-100s Afib, /60 at the bedside, RR 20, and SpO2 95% on room air. On physical exam, with JVD ~8cm, without pertinent cardiac or respiratory exam findings, without associated LE edema. Labs c/w CRISSY, hsTrop >10,000, lactate 2.5. EKG with Afib with RBBB, with Q waves on lateral leads. Bedside TTE with reduced EF ~15-20%. Repeat CT chest angio to r/o aortic dissection was negative for significant dissection.    Admitted to CICU for close monitoring, chest pain management on nitro gtt, pending [facility] in AM      (2021 00:11)    Hospital Course:   transferred to CCU for further management     24 HOUR EVENTS:  - no acute events    REVIEW OF SYSTEMS:   Constitutional: No weakness, fevers, or chills  Eyes/ENT: No visual changes  Respiratory: No cough, wheezing, hemoptysis  Cardiovascular: No chest pain, no palpitations  Gastrointestinal: No abdominal pain. No nausea, vomiting, hematemesis.   Genitourinary: No dysuria  Neurological: No numbness, no weakness  Skin: No itching, rashes    ICU Vital Signs Last 24 Hrs  T(C): 37.7 (15 Jul 2021 19:00), Max: 37.7 (15 Jul 2021 19:00)  T(F): 99.9 (15 Jul 2021 19:00), Max: 99.9 (15 Jul 2021 19:00)  HR: 104 (15 Jul 2021 22:00) (84 - 114)  BP: 93/55 (15 Jul 2021 08:00) (93/55 - 93/55)  BP(mean): 64 (15 Jul 2021 08:00) (64 - 64)  ABP: 96/50 (15 Jul 2021 22:00) (78/38 - 158/134)  ABP(mean): 70 (15 Jul 2021 22:00) (52 - 226)  RR: 20 (15 Jul 2021 22:00) (16 - 28)  SpO2: 97% (15 Jul 2021 22:) (82% - 100%)      CVP(mm Hg): 5 (07-15-21 @ 22:00) (-4 - 11)  PA: 52/16 (07-15-21 @ 22:00) (27/2 - 55/18)  PA(mean): 30 (07-15-21 @ 22:00) (11 - 34)    I&O's Summary    2021 07:01  -  15 Jul 2021 07:00  --------------------------------------------------------  IN: 1104.9 mL / OUT: 940 mL / NET: 164.9 mL    15 Jul 2021 07:01  -  15 Jul 2021 22:37  --------------------------------------------------------  IN: 1667.1 mL / OUT: 390 mL / NET: 1277.1 mL      PHYSICAL EXAM:   General: No acute distress  Eyes: EOMI, PERRLA, conjunctiva and sclera clear  Chest/Lung: CTAB, no wheezes, rales, or rhonchi  Heart: Regular rate, regular rhythm. Normal S1/S2. No murmurs, rubs, or gallops.  Abdomen: Soft, nontender, nondistended. Normal bowel sounds.  Extremities: 2+ peripheral pulses B/L. No clubbing, cyanosis, or edema.  Neurology: A&O x3, no focal deficits  Skin: No rashes or lesions  ============================I/O===========================   I&O's Detail    2021 07:  -  15 Jul 2021 07:00  --------------------------------------------------------  IN:    DOBUTamine: 22 mL    DOBUTamine: 177.8 mL    Heparin: 30 mL    IV PiggyBack: 110 mL    Norepinephrine: 405.1 mL    Oral Fluid: 360 mL  Total IN: 1104.9 mL    OUT:    Indwelling Catheter - Urethral (mL): 940 mL  Total OUT: 940 mL    Total NET: 164.9 mL      15 Jul 2021 07:  -  15 Jul 2021 22:37  --------------------------------------------------------  IN:    DOBUTamine: 133.5 mL    Heparin: 86 mL    Heparin: 40 mL    IV PiggyBack: 500 mL    Norepinephrine: 427.6 mL    Oral Fluid: 480 mL  Total IN: 1667.1 mL    OUT:    Indwelling Catheter - Urethral (mL): 390 mL  Total OUT: 390 mL    Total NET: 1277.1 mL    ============================ LABS =========================                        10.2   9.17  )-----------( 156      ( 15 Jul 2021 01:38 )             30.5     07-15    134<L>  |  107  |  47<H>  ----------------------------<  129<H>  4.4   |  18<L>  |  2.73<H>    Ca    8.8      15 Jul 2021 01:38  Phos  2.6     07-15  Mg     2.1     07-15    TPro  4.9<L>  /  Alb  2.7<L>  /  TBili  1.3<H>  /  DBili  x   /  AST  59<H>  /  ALT  43  /  AlkPhos  62  07-15    Troponin T, High Sensitivity Result: >88519 ng/L (21 @ 01:22)  Troponin T, High Sensitivity Result: >68647 ng/L (21 @ 18:04)    CKMB Units: 22.2 ng/mL (21 @ 01:22)  CKMB Units: 26.6 ng/mL (21 @ 18:04)    Creatine Kinase, Serum: 299 U/L (21 @ 01:22)  Creatine Kinase, Serum: 359 U/L (21 @ 18:04)    CPK Mass Assay %: 7.4 % (21 @ 01:22)  CPK Mass Assay %: 7.4 % (21 @ 18:04)    LIVER FUNCTIONS - ( 15 Jul 2021 01:38 )  Alb: 2.7 g/dL / Pro: 4.9 g/dL / ALK PHOS: 62 U/L / ALT: 43 U/L / AST: 59 U/L / GGT: x           PT/INR - ( 15 Jul 2021 01:38 )   PT: 12.1 sec;   INR: 1.01 ratio         PTT - ( 15 Jul 2021 16:34 )  PTT:80.9 sec  ABG - ( 15 Jul 2021 01:35 )  pH, Arterial: 7.43  pH, Blood: x     /  pCO2: 32    /  pO2: 128   / HCO3: 21    / Base Excess: -2.4  /  SaO2: 99        Blood Gas Arterial, Lactate: 0.9 mmol/L (07-15-21 @ 01:35)  Blood Gas Arterial, Lactate: 1.3 mmol/L (21 @ 10:17)  Blood Gas Venous - Lactate: 1.3 mmoL/L (21 @ 10:11)  Blood Gas Venous - Lactate: 1.2 mmoL/L (21 @ 03:02)  Blood Gas Venous - Lactate: 2.0 mmoL/L (21 @ 20:41)  Lactate, Blood: 1.0 mmol/L (21 @ 18:04)    ======================Micro/Rad/Cardio=================  Telemetry: Reviewed   EKG: Reviewed  CXR: Reviewed  Culture: Reviewed   Echo: Reviewed  Cath: Reviewed  ======================================================  PAST MEDICAL & SURGICAL HISTORY:  Afib  Hypertension  Hyperparathyroidism  Osteoporosis  Dyslipidemia  Macular degeneration  Temporal arteritis  Spinal stenosis  Osteoarthritis  Sternal fracture  2010  History of cholecystectomy  H/O nasal septoplasty  History of lumbar laminectomy  with microdiskectomy  H/O parathyroidectomy  H/O total knee replacement, left  History of temporal arteritis  b/l surgical repair  H/O bilateral cataract extraction    ====================ASSESSMENT ==============  Late presentation inferolateral STEMI  CRISSY likely in setting of BLANCA  Anemia     Plan:  ====================== NEUROLOGY=====================  AOx3  - no acute issues   - tylenol PRN for analgesia/fever   - sleep regimen with melatonin     ==================== RESPIRATORY======================  Stable on RA, SpO2 97%  - continue pulse ox monitoring, follow ABGs     ====================CARDIOVASCULAR==================  Late presentation inferolateral STEMI c/b cardiogenic shock  - LHC revealing clear cors  - presentation possibly 2/2 tako vs severe AS vs myopericarditis   - cont ASA, statin   - c/w  and levo   - continue invasive hemodynamic monitoring via TLC and Ana Laura   - monitor CVPs, VBGs and uptitrate inotrope as needed   - TTE : EF 24%, mod MR, LV dysfunction, decreased RV function   - hep gtt     Severe AS  - structural team following  -TAVR w/u    ===================HEMATOLOGIC/ONC ===================  Anemia  - monitor H&H/Plts   - heparin gtt for STEMI   ==================== RENAL =========================  CRISSY likely in setting of BLANCA and low flow  - SCr baseline appears ~1.4 ()  - Avoid nephrotoxins, renally dose meds  - Renal consult noted    ==================== GASTROINTESTINAL===================  - Tolerating mechanical soft diet     =======================    ENDOCRINE  =====================  No acute issues   - continue monitoring blood glucose closely for need to initiate sliding scale     ========================INFECTIOUS DISEASE================  Temp 99.9F. , WBC WNL  - Continue trending WBC and monitoring fever curve       Patient requires continuous monitoring with bedside rhythm monitoring, pulse ox monitoring, and intermittent blood gas analysis. Care plan discussed with ICU care team. Patient remained critical and at risk for life threatening decompensation.  Patient seen, examined and plan discussed with CCU team during rounds.     I have personally provided 35 minutes of critical care time excluding time spent on separate procedures.    By signing my name below, I, Kerri Gordon, attest that this documentation has been prepared under the direction and in the presence of Mallika Thomas NP   Electronically signed: Timothy Ayers, 07-15-21 @ 22:37    I, Mallika Thomas, personally performed the services described in this documentation. all medical record entries made by the timothy were at my direction and in my presence. I have reviewed the chart and agree that the record reflects my personal performance and is accurate and complete  Electronically signed: Mallika Thomas NP

## 2021-07-15 NOTE — PROGRESS NOTE ADULT - ASSESSMENT
====================ASSESSMENT ==============  Late presentation inferolateral STEMI  CRISSY likely in setting of BLANCA      Plan:  ====================== NEUROLOGY=====================  AOx3  - no acute issues   - tylenol PRN for analgesia/fever   - Will increase Melatonin to 5mg HS    acetaminophen   Tablet .. 650 milliGRAM(s) Oral every 6 hours PRN Temp greater or equal to 38C (100.4F), Mild Pain (1 - 3), Moderate Pain (4 - 6)  melatonin 3 milliGRAM(s) Oral at bedtime    ==================== RESPIRATORY======================  Stable on RA, SpO2 96%  - continue pulse ox monitoring, follow ABGs     ====================CARDIOVASCULAR==================  Likely Takotsubo Cardiomyopathy vs Myopericarditis c/b cardiogenic shock, Severe AS  - LHC revealing clear cors  - cont ASA statin Hold AV hector blockes  - trend CE   - Will place Left IJ Cordis, PAC/RHC for additional objective data in the setting of undifferentiated shock      Severe AS  - structural eval noted  - cont     aspirin enteric coated 81 milliGRAM(s) Oral daily  atorvastatin 80 milliGRAM(s) Oral at bedtime  DOBUTamine Infusion 2.5 MICROgram(s)/kG/Min (4.43 mL/Hr) IV Continuous <Continuous>  norepinephrine Infusion 0.05 MICROgram(s)/kG/Min (5.54 mL/Hr) IV Continuous <Continuous>    ===================HEMATOLOGIC/ONC ===================  No acute issues   - monitor H&H/Plts     ===================== RENAL =========================  CRISSY likely in setting of BLANCA and low flow, High Anion Gap Metabolic Acidemia, likely related to CRISSY  - continue dobutamine  - SCr baseline appears ~1.4 ()  - Avoid nephrotoxins, renally dose meds  - Renal consult noted  - Continue with 650mg Sodium Bicarbonate q 8hours    ==================== GASTROINTESTINAL===================  Continue DASH diet     =======================    ENDOCRINE  =====================  No acute issues   - continue monitoring blood glucose closely for need to initiate sliding scale     ========================INFECTIOUS DISEASE================  Afebrile, WBC within normal limits  - Continue trending WBC and monitoring fever curve     RIJ TLC ( - ), Left Acillary Arterial Catheter ( - ), [place] Catheter      Patient requires continuous monitoring with bedside rhythm monitoring, pulse ox monitoring, and intermittent blood gas analysis. Care plan discussed with ICU care team. Patient remained critical and at risk for life threatening decompensation.  Patient seen, examined and plan discussed with CCU team during rounds.    Ger Bhatia PA-C  CICU PA

## 2021-07-15 NOTE — PROGRESS NOTE ADULT - SUBJECTIVE AND OBJECTIVE BOX
86y      T(C): 37.1 (07-15-21 @ 08:00), Max: 37.8 (21 @ 19:00)  HR: 94 (07-15-21 @ 11:40) (84 - 114)  BP: 93/55 (07-15-21 @ 08:00) (93/55 - 93/55)  RR: 22 (07-15-21 @ 09:50) (18 - 26)  SpO2: 96% (07-15-21 @ 11:40) (95% - 100%)    21 @ 07:01  -  07-15-21 @ 07:00  --------------------------------------------------------  IN: 1104.9 mL / OUT: 940 mL / NET: 164.9 mL    07-15-21 @ 07:01  -  07-15-21 @ 12:09  --------------------------------------------------------  IN: 482.9 mL / OUT: 95 mL / NET: 387.9 mL        acetaminophen   Tablet .. 650 milliGRAM(s) Oral every 6 hours PRN  aspirin enteric coated 81 milliGRAM(s) Oral daily  atorvastatin 80 milliGRAM(s) Oral at bedtime  chlorhexidine 2% Cloths 1 Application(s) Topical daily  DOBUTamine Infusion 5 MICROgram(s)/kG/Min IV Continuous <Continuous>  heparin  Infusion 1000 Unit(s)/Hr IV Continuous <Continuous>  melatonin 5 milliGRAM(s) Oral at bedtime  multivitamin 1 Tablet(s) Oral daily  norepinephrine Infusion 0.05 MICROgram(s)/kG/Min IV Continuous <Continuous>  sodium bicarbonate 650 milliGRAM(s) Oral three times a day  sodium chloride 0.9% lock flush 10 milliLiter(s) IV Push every 1 hour PRN      PHYSICAL EXAM:    General: Frail. No distress. Comfortable.  HEENT: EOM intact.  Neck: Neck supple. JVP 6 cm H2O. No masses  Chest: Clear to auscultation bilaterally  CV: Irregular. Normal S1 and S2. II/VI SM LLSB Radial pulses normal. No LE edema. Warm peripherally.  Abdomen: Soft, non-distended, diffusely mildly tender to palpation  Skin: No rashes or skin breakdown  Neurology: Alert and oriented times three. Sensation intact  Psych: Affect normal      Echo:  2021  PROCEDURE: Transthoracic echocardiogram with 2-D, M-Mode  and complete spectral and color flow Doppler. Verbal  consent was obtained for injection of  Ultrasonic Enhancing  Agent following a discussion of risks and benefits.  Following intravenous injection of Ultrasonic Enhancing  Agent , harmonic imaging was performed.  INDICATION: Non-ST elevation (NSTEMI) myocardial infarction  (I21.4)  ------------------------------------------------------------------------  Dimensions:    Normal Values:  LA:            2.0 - 4.0 cm  Ao:            2.0 - 3.8 cm  SEPTUM:        0.6 - 1.2 cm  PWT:           0.6 - 1.1 cm  LVIDd:         3.0 - 5.6 cm  LVIDs:         1.8 - 4.0 cm  EF (Ryan Rule): 24 %Doppler Peak Velocity (m/sec):  AoV=3.8  ------------------------------------------------------------------------  Observations:  Mitral Valve: Mitral annular calcification. Tethered mitral  valve leaflets with normal opening. Moderate mitral  regurgitation.  Aortic Valve/Aorta: Calcified trileaflet aortic valve with  decreased opening. Peak transaortic valve gradient equals  58 mm Hg, mean transaortic valve gradient equals 37 mm Hg,  estimated aortic valve area equals 0.4 sqcm (by continuity  equation), aortic valve velocity time integral equals 75  cm, consistent with severe aortic stenosis. Minimal aortic  regurgitation.  Peak left ventricular outflow tract  gradient equals 1 mm Hg, mean gradient is equal to 1 mm Hg,  LVOT velocity time integral equals 11 cm.  LVOT diameter: 1.9 cm.  Left Atrium: Severely dilated left atrium.  LA volume index  = 50 cc/m2.  Left Ventricle: Severe segmental left ventricular systolic  dysfunction ( Takotsubo).  Endocardial visualization  enhanced with intravenous injection of Ultrasonic Enhancing  Agent (Definity). No left ventricular thrombus. The basal  anterior septum, and the mid inferoseptum are hypokinetic.  The mid anterior septum, the apical inferior wall, the  apical anterior wall, the apical septum, the apical lateral  wall, and the mid anterior wall are akinetic.  Normal left  ventricular internal dimensions and wall thicknesses.  Severe reversible diastolic dysfunction (Stage III).  Right Heart: Normal right atrium. Normal right ventricular  size with decreased right ventricular systolic function.  Tethered tricuspid valve. Moderate tricuspid regurgitation.  Pulmonic valve not well visualized.  Pericardium/Pleura: Normal pericardium with trace  pericardial effusion.  Right pleural effusion.  Hemodynamic: Estimated right atrial pressure is 8 mm Hg.  Estimated right ventricular systolic pressure equals 52 mm  Hg, assuming right atrial pressure equals 8 mm Hg,  consistent with moderate pulmonary hypertension.  ------------------------------------------------------------------------  Conclusions:  1. Mitral annular calcification. Tethered mitral valve  leaflets with normal opening. Moderate mitral  regurgitation.  2. Calcified trileaflet aortic valve with decreased  opening. Peak transaortic valve gradient equals 58 mm Hg,  mean transaortic valve gradient equals 37 mm Hg, estimated  aortic valve area equals 0.4 sqcm (by continuity equation),  aortic valve velocity time integral equals 75 cm,  consistent with severe aortic stenosis. Minimal aortic  regurgitation.  3. Severe segmental left ventricular systolic dysfunction (  Takotsubo).  Endocardial visualization enhanced with  intravenous injection of Ultrasonic Enhancing Agent  (Definity). No left ventricular thrombus. The basal  anterior septum, and the mid inferoseptum are hypokinetic.  The mid anterior septum, the apical inferior wall, the  apical anterior wall, the apical septum, the apical lateral  wall, and the mid anterior wall are akinetic.  4. Severe reversible diastolic dysfunction (Stage III).  5. Normal right ventricular size with decreased right  ventricular systolic function.  *** Compared with echocardiogram of 2021, no  significant changes noted.      Cardiac Cath:  2021  PROCEDURE:  --  Left coronary angiography.  --  Right coronary angiography.  --  Sonosite - Diagnostic.  TECHNIQUE: The risks and alternatives of the procedures and conscious  sedation were explained to the patient and informed consent was obtained.  Cardiac catheterization performed electively.  Local anesthetic given. Right femoral artery access. Left coronary artery  angiography. The vessel was injected utilizing a catheter. Right coronary  artery angiography. The vessel was injected utilizing a catheter. Sonosite  - Diagnostic. RADIATION EXPOSURE: 1.4 min.  CONTRAST GIVEN: Omnipaque 19 ml.  MEDICATIONS GIVEN: Midazolam, 0.5 mg, IV. Fentanyl, 25 mcg, IV.  CORONARY VESSELS: The coronary circulation is right dominant.  LM:   --  LM: Angiography showed minor luminal irregularities with no flow  limiting lesions.  LAD:   --  LAD: Angiography showed minor luminal irregularities with no  flow limiting lesions.  CX:   --  Circumflex: Angiography showed minor luminal irregularities with  no flow limiting lesions.  RCA:   --  RCA: Angiography showed minor luminal irregularities with no  flow limiting lesions.  COMPLICATIONS: There were no complications.  DIAGNOSTIC RECOMMENDATIONS: The patient should continue with the present  medications. Evalaution for TAVR  Prepared and signed by  Jack Chatterjee M.D.  Signed 2021 15:18:45  HEMODYNAMIC TABLES  Outputs:  Baseline  Outputs:  -- CALCULATIONS[age]  Outputs:  -- CALCULATIONS: Body Surface Area: 1.66  Outputs:  -- CALCULATIONS: Height in cm: 167.00  Outputs:  -- CALCULATIONS: Sex: Female  Outputs:  -- CALCULATIONS: Weight in k.00  Outputs:  -- OUTPUTS: O2 consumption: 207.66  Outputs:  -- OUTPUTS: Vo2 Indexed: 125.00

## 2021-07-15 NOTE — PROGRESS NOTE ADULT - NSPROGADDITIONALINFOA_GEN_ALL_CORE
plan discussed and reviewed with attending.   Ca Jacome MD PGY-I Pager [phone]
plan discussed and reviewed on rounds with attending  Ca Jacome MD PGY-I Pager [phone]

## 2021-07-15 NOTE — PROGRESS NOTE ADULT - ASSESSMENT
86 year old woman with PMHx of HTN and paroxysmal Afib on coumadin who presented with CP and syncope initially to [facility] on 7/11, found to have elevated troponin with CTA negative for PE and transferred to [facility] for further evaluation. TTE with new finding of severely depressed LV systolic function and what appears to be severe AS. She underwent ischemic evaluation by [facility] on 7/12 revealing nonobstructive CAD. She developed hypotension on 7/13 accompanied by CRISSY/Transaminitis and was transferred to CICU with pressor and inotropic support. Placement of PAC demonstrated low R sided filling pressures and preserved CO on high dose .     Clinically improving with inotropic support and gentle IV fluid hydration with improving markers of end organ function. Unclear precipitant of acute decline in LV systolic function but suspect Takotsubo vs embolic event.       Cardiac Studies  EKG: afib HR 94, RBBB  TTE 7/14 LVEF 24%, takotsubo, Stage III DD, nl RV size with RVSD, mod MR, severe AS, mod TR, est RVSP 52 mm Hg  [facility] 7/12 minor luminal irregularities    Hemodynamics  7/15 ( 5, Levo 0.3) CVP 4, PA 47/13/26, PCWP 19, Mv02 67%, CO/CI (F) 4.6/2.8, SVR 1078 dsc, MAP 70, HR 95  7/14: CVP 1, central sat 58%, CI 2.2

## 2021-07-15 NOTE — PROGRESS NOTE ADULT - PROBLEM SELECTOR PLAN 1
- continue  at 5 mcg/kg/min  - trend hemodynamics and markers of end organ function (LFTs, lactate, Mv02)  - gentle IVF for goal CVP 6-8  - once fluid balance has improved, can trial wean of levophed for target SBP >/= 90

## 2021-07-15 NOTE — PROGRESS NOTE ADULT - SUBJECTIVE AND OBJECTIVE BOX
Subjective:  - notes improved appetite  - RLQ ABD tenderness to palpation; reports last BM to be few days prior to admission   - s/p 250 cc NS bolus this AM for CVP 0-1  - remains on  at 5 mcg and pressor support with Levophed    Medications:  acetaminophen   Tablet .. 650 milliGRAM(s) Oral every 6 hours PRN  aspirin enteric coated 81 milliGRAM(s) Oral daily  atorvastatin 80 milliGRAM(s) Oral at bedtime  chlorhexidine 2% Cloths 1 Application(s) Topical daily  DOBUTamine Infusion 5 MICROgram(s)/kG/Min IV Continuous <Continuous>  heparin  Infusion 1000 Unit(s)/Hr IV Continuous <Continuous>  melatonin 5 milliGRAM(s) Oral at bedtime  multivitamin 1 Tablet(s) Oral daily  norepinephrine Infusion 0.05 MICROgram(s)/kG/Min IV Continuous <Continuous>  sodium bicarbonate 650 milliGRAM(s) Oral three times a day  sodium chloride 0.9% lock flush 10 milliLiter(s) IV Push every 1 hour PRN      Physical Exam:    Vitals:  Vital Signs Last 24 Hours  T(C): 37.4 (07-15-21 @ 12:00), Max: 37.8 (21 @ 19:00)  HR: 92 (07-15-21 @ 15:30) (84 - 114)  BP: 93/55 (07-15-21 @ 08:00) (93/55 - 93/55)  RR: 24 (07-15-21 @ 11:50) (18 - 26)  SpO2: 97% (07-15-21 @ 15:30) (95% - 100%)    Weight in k.7 (07-15 @ 06:00)    I&O's Summary    2021 07:  -  15 Jul 2021 07:00  --------------------------------------------------------  IN: 1104.9 mL / OUT: 940 mL / NET: 164.9 mL    15 Jul 2021 07:  -  15 Jul 2021 16:00  --------------------------------------------------------  IN: 931.5 mL / OUT: 230 mL / NET: 701.5 mL    Tele: AF HR 80-90s; [place]     General: Frail. No distress. Comfortable laying recumbent   HEENT: EOM intact.  Neck: Neck supple. JVP <6 cm H2O, no HJR  Chest: Clear to auscultation bilaterally  CV: Irregular. Normal S1 and S2. III/VI SM LLSB. No peripheral edema  Abdomen: Soft, non-distended, RLQ mildly tender to palpation  Skin: RIJ PAC dressing C/D/I. L lateral neck prior central line site with appearance of protruding lump under dressing.   Extremities: Warm peripherally  Neurology: Alert and oriented times three. Sensation intact  Psych: Affect normal    Labs:                        10.2   9.17  )-----------( 156      ( 15 Jul 2021 01:38 )             30.5     07-15    134<L>  |  107  |  47<H>  ----------------------------<  129<H>  4.4   |  18<L>  |  2.73<H>    Ca    8.8      15 Jul 2021 01:38  Phos  2.6     07-15  Mg     2.1     07-15    TPro  4.9<L>  /  Alb  2.7<L>  /  TBili  1.3<H>  /  DBili  x   /  AST  59<H>  /  ALT  43  /  AlkPhos  62  07-15    PT/INR - ( 15 Jul 2021 01:38 )   PT: 12.1 sec;   INR: 1.01 ratio         PTT - ( 15 Jul 2021 10:22 )  PTT:111.1 sec  CARDIAC MARKERS ( 2021 01:22 )  x     / x     / 299 U/L / x     / 22.2 ng/mL  CARDIAC MARKERS ( 2021 18:04 )  x     / x     / 359 U/L / x     / 26.6 ng/mL      Serum Pro-Brain Natriuretic Peptide: 63850 pg/mL ( @ 21:34)  Serum Pro-Brain Natriuretic Peptide: 8543 pg/mL ( @ 17:35)  Creatine Kinase, Serum: 299 U/L (21 @ 01:22)  Creatine Kinase, Serum: 359 U/L (21 @ 18:04)  Creatine Kinase, Serum: 299 U/L (21 @ 01:22)  Creatine Kinase, Serum: 359 U/L (21 @ 18:04)    Oxygen Saturation, Mixed: 67 % (07-15 @ 01:36)  Oxygen Saturation, Mixed: 62 % ( @ 14:24)  Oxygen Saturation, Mixed: 56 % ( @ 11:46)      Lactate, Blood: 1.0 mmol/L ( @ 18:04)

## 2021-07-16 NOTE — PROGRESS NOTE ADULT - ASSESSMENT
85 yo F HTN, pAF ([id number] 5, coumadin), a/w suspected late presentation MI, LHC w/ LIR LM, LAD, LCx, RCA, TTE w/ EF 25%, severe AS, mod-sev TR. also w/  CRISSY, elevated LFTs, Requiring dobutamine and levophed gtts. S/p volume resuscitation based on low PAP/CVP     # neuro - A, A, O x 4   - insomnia - on melatonin, not sleeping ,consider seroquel qHS to promote normal sleep schedule     # CV   - HFrEF and cardiogenic shock - on dobutamine - wean to 4 mcg/kg/min per HF recs, wean levophed gtt as tolerated to MAP 65, swan DCed s/t malposition, f/u CVP, central venous sat, strict I&Os, Bun/Cr  85 yo F HTN, pAF ([id number] 5, coumadin), a/w suspected late presentation MI, LHC w/ LIR LM, LAD, LCx, RCA, TTE w/ EF 25%, severe AS, mod-sev TR. also w/  CRISSY, elevated LFTs, Requiring dobutamine and levophed gtts. S/p volume resuscitation based on low PAP/CVP     # neuro - A, A, O x 4   - insomnia - on melatonin, not sleeping ,consider seroquel qHS to promote normal sleep schedule     # Resp - saturating well on RA, breathing well. monitor respiratory and volume status     # CV   - HFrEF and cardiogenic shock - s/t sev AS vs myopericarditis vs takosubo cardiomyopathy - on dobutamine - wean to 4 mcg/kg/min per HF recs, wean levophed gtt as tolerated to MAP 65, swan DCed s/t malposition, f/u CVP, central venous sat, strict I&Os, monitor BUN/Cr and lactic acid. warm and well perufsed on exam. monitor volume status closely   - sev AS - structural heart following for BAV   - AF - rates low 100s, c/w heparin gtt for AC   - currently hypotensive - hold home antihypertensives     # GI   - constipation - dulcolax suppository, tolerating diet, abd exam unremarkable but no BM x 7 days, c/w miralax/senna     #  / renal   - CRISSY in setting of BLANCA/cardiogenic shock - Cr downtrending (b/l 1.4 reportedly), monitor UOP, maintain perfusion, strict I&Os, daily weights, monitor Cr and lytes. avoid nephrotoxins. leave grove for now to monitor UOP     # Endo   - monitor finger sticks, insulin PRN     # Heme   - Hgb 9,3 - no s/s active bleeding, monitor for bleeding on heparin gtt     # ID   - no s/s infection @ this time, afebrile, non toxic appearing. monitor for fevers, leukocytosis, de line as tolerated     d/w Dr Park and CICU team     Krys Nava [facility]-BC/CCU  spectra #[id number]  beeper #[id number]

## 2021-07-16 NOTE — PROGRESS NOTE ADULT - SUBJECTIVE AND OBJECTIVE BOX
ANDRE DE LUNA  MRN-[id number]  Patient is a 86y old  Female who presents with a chief complaint of late presentation MI (2021 19:06)    HPI:  85 yo lady PMHx of HTN and paroxysmal Afib on coumadin who was transferred to [facility] ED from [place] [facility] where she presented with 2 week hx of intermittent atypical chest pain, with acute exacerbation of chest pain radiating to back for the last 2 days, also associated with single episode of syncope at home today.     In the OSH ED, vitals - HR 80-90s Afib, -130s, RR 20, and SpO2 98% on 2L NC. Unremarkable exam reported. Labs c/w CRISSY on CKD (Cr 1.9 compared to 1.4 in 2019), with trop >40, pBNP 8543, and CKMB 94.5. CT chest angio (90cc contrast) without evidence of PE but inconclusive study on aortic dissection. Was transferred to [facility] ED for further management.    At the [facility] ED, vitals - HR 90-100s Afib, /60 at the bedside, RR 20, and SpO2 95% on room air. On physical exam, with JVD ~8cm, without pertinent cardiac or respiratory exam findings, without associated LE edema. Labs c/w CRISSY, hsTrop >10,000, lactate 2.5. EKG with Afib with RBBB, with Q waves on lateral leads. Bedside TTE with reduced EF ~15-20%. Repeat CT chest angio to r/o aortic dissection was negative for significant dissection.    Admitted to CICU for close monitoring, chest pain management on nitro gtt, pending [facility] in AM      (2021 00:11)      Hospital Course:   transferred to CCU for further management     24 HOUR EVENTS:    REVIEW OF SYSTEMS:   Constitutional: + fevers, or chills  Eyes/ENT: No visual changes  Respiratory: No cough, wheezing, hemoptysis  Cardiovascular: No chest pain, no palpitations  Gastrointestinal: No abdominal pain.   Genitourinary: No dysuria  Neurological: No numbness, no weakness  Skin: No itching, rashes    ICU Vital Signs Last 24 Hrs  T(C): 37.2 (2021 18:00), Max: 37.3 (15 Jul 2021 23:00)  T(F): 98.9 (2021 18:00), Max: 99.1 (15 Jul 2021 23:00)  HR: 92 (2021 18:30) (80 - 108)  BP: --  BP(mean): --  ABP: 100/56 (2021 18:30) (66/34 - 114/68)  ABP(mean): 72 (2021 18:30) (46 - 88)  RR: 22 (2021 18:00) (10 - 28)  SpO2: 96% (2021 18:30) (96% - 99%)      CVP(mm Hg): 9 (21 @ 11:30) (-4 - 14)  CO: --  CI: --  PA: 51/16 (21 @ 11:30) (27/2 - 58/23)  PA(mean): 30 (21 @ 11:30) (0 - 36)  PA(direct): --  PCWP: --  LA: --  RA: --  SVR: --  SVRI: --  PVR: --  PVRI: --  I&O's Summary    15 Jul 2021 07:01  -  2021 07:00  --------------------------------------------------------  IN: 2315.3 mL / OUT: 790 mL / NET: 1525.3 mL    2021 07:01  -  2021 19:39  --------------------------------------------------------  IN: 918.6 mL / OUT: 490 mL / NET: 428.6 mL        CAPILLARY BLOOD GLUCOSE    CAPILLARY BLOOD GLUCOSE          PHYSICAL EXAM:   General: No acute distress  Eyes: EOMI, PERRLA, conjunctiva and sclera clear  Chest/Lung: CTAB, no wheezes, rales, or rhonchi  Heart: Regular rate, regular rhythm. Normal S1/S2. No murmurs, rubs, or gallops.  Abdomen: Soft, nontender, nondistended. Normal bowel sounds.  Extremites: 2+ peripheral pulses B/L. No clubbing, cyanosis, or edema.  Neurology: A&O x3, no focal deficits  Skin: No rashes or lesions  ============================I/O===========================   I&O's Detail    15 Jul 2021 07:  -  2021 07:00  --------------------------------------------------------  IN:    DOBUTamine: 213.6 mL    Heparin: 112 mL    Heparin: 86 mL    IV PiggyBack: 883.3 mL    Norepinephrine: 540.4 mL    Oral Fluid: 480 mL  Total IN: 2315.3 mL    OUT:    Indwelling Catheter - Urethral (mL): 790 mL  Total OUT: 790 mL    Total NET: 1525.3 mL      2021 07:  -  2021 19:39  --------------------------------------------------------  IN:    DOBUTamine: 17.8 mL    DOBUTamine: 42.6 mL    DOBUTamine: 7.1 mL    DOBUTamine: 17.8 mL    Heparin: 88 mL    IV PiggyBack: 349.9 mL    Norepinephrine: 155.4 mL    Oral Fluid: 240 mL  Total IN: 918.6 mL    OUT:    Indwelling Catheter - Urethral (mL): 490 mL  Total OUT: 490 mL    Total NET: 428.6 mL        ============================ LABS =========================                        9.3    8.08  )-----------( 169      ( 2021 13:03 )             28.1     07-16    136  |  107  |  38<H>  ----------------------------<  122<H>  4.2   |  18<L>  |  1.92<H>    Ca    8.5      2021 03:32  Phos  2.3     07-  Mg     1.8     -16    TPro  4.9<L>  /  Alb  2.5<L>  /  TBili  1.1  /  DBili  x   /  AST  41<H>  /  ALT  41  /  AlkPhos  60  07-16    LIVER FUNCTIONS - ( 2021 03:32 )  Alb: 2.5 g/dL / Pro: 4.9 g/dL / ALK PHOS: 60 U/L / ALT: 41 U/L / AST: 41 U/L / GGT: x           PT/INR - ( 2021 03:32 )   PT: 12.1 sec;   INR: 1.01 ratio         PTT - ( 2021 03:32 )  PTT:85.4 sec  ABG - ( 2021 03:18 )  pH, Arterial: 7.47  pH, Blood: x     /  pCO2: 30    /  pO2: 113   / HCO3: 21    / Base Excess: -1.2  /  SaO2: 99                ======================Micro/Rad/Cardio=================  Telemetry: Reviewed   EKG: Reviewed  CXR: Reviewed  Culture: Reviewed   Echo: Reviewed  Cath: Reviewed  ======================================================  PAST MEDICAL & SURGICAL HISTORY:  Afib    Hypertension    Hyperparathyroidism    Osteoporosis    Dyslipidemia    Macular degeneration    Temporal arteritis    Spinal stenosis    Osteoarthritis    Sternal fracture      History of cholecystectomy    H/O nasal septoplasty    History of lumbar laminectomy  with microdiskectomy    H/O parathyroidectomy    H/O total knee replacement, left    History of temporal arteritis  b/l surgical repair    H/O bilateral cataract extraction      ====================ASSESSMENT ==============  Late presentation inferolateral STEMI  CRISSY likely in setting of BLANCA  Anemia     Plan:  ====================== NEUROLOGY=====================  AOx3  - no acute issues   - Tylenol PRN for analgesia/fever   - sleep regimen with melatonin       acetaminophen   Tablet .. 650 milliGRAM(s) Oral every 6 hours PRN Temp greater or equal to 38C (100.4F), Mild Pain (1 - 3), Moderate Pain (4 - 6)  melatonin 5 milliGRAM(s) Oral at bedtime    ==================== RESPIRATORY======================  Stable on RA, SpO2 97%  - continue pulse ox monitoring, follow ABGs       ====================CARDIOVASCULAR==================  Late presentation inferolateral STEMI c/b cardiogenic shock  - [facility] revealing clear cors  - presentation possibly 2/2 tako vs severe AS vs myopericarditis   - cont ASA, statin   - c/w  and levo   - continue invasive hemodynamic monitoring via TLC and Ana Laura   - monitor CVPs, VBGs and uptitrate inotrope as needed   - TTE : EF 24%, mod MR, LV dysfunction, decreased RV function   - hep gtt     Severe AS  - structural team following  -TAVR w/u    aspirin enteric coated 81 milliGRAM(s) Oral daily  atorvastatin 80 milliGRAM(s) Oral at bedtime  DOBUTamine Infusion 5 MICROgram(s)/kG/Min (8.87 mL/Hr) IV Continuous <Continuous>  norepinephrine Infusion 0.05 MICROgram(s)/kG/Min (5.54 mL/Hr) IV Continuous <Continuous>    ===================HEMATOLOGIC/ONC ===================  Anemia  - monitor H&H/Plts   - heparin gtt for STEMI     heparin  Infusion 800 Unit(s)/Hr (8 mL/Hr) IV Continuous <Continuous>    ===================== RENAL =========================  CRISSY likely in setting of BLANCA and low flow  - SCr baseline appears ~1.4 (2019)  - Avoid nephrotoxins, renally dose meds  - Renal consult noted      ==================== GASTROINTESTINAL===================  - Tolerating mechanical soft diet   - Bowel regimen with Miralax.     aluminum hydroxide/magnesium hydroxide/simethicone Suspension 30 milliLiter(s) Oral every 6 hours PRN Dyspepsia  multivitamin 1 Tablet(s) Oral daily  polyethylene glycol 3350 17 Gram(s) Oral daily  senna 2 Tablet(s) Oral at bedtime  sodium bicarbonate 650 milliGRAM(s) Oral three times a day  sodium chloride 0.9% lock flush 10 milliLiter(s) IV Push every 1 hour PRN Pre/post blood products, medications, blood draw, and to maintain line patency    =======================    ENDOCRINE  =====================  No acute issues   - continue monitoring blood glucose closely for need to initiate sliding scale       ========================INFECTIOUS DISEASE================  Temp 99.9F->99.1F. , WBC: WNL  - Continue trending WBC and monitoring fever curve       Patient requires continuous monitoring with bedside rhythm monitoring, pulse ox monitoring, and intermittent blood gas analysis. Care plan discussed with ICU care team. Patient remained critical and at risk for life threatening decompensation.  Patient seen, examined and plan discussed with CCU team during rounds.     I have personally provided 35 minutes of critical care time excluding time spent on separate procedures.    By signing my name below, I, Marion John, attest that this documentation has been prepared under the direction and in the presence of Jagdsih Clement NP  Electronically signed: Timothy Michelle, 21 @ 19:39    I, Jagdish Clement NP, personally performed the services described in this documentation. all medical record entries made by the timothy were at my direction and in my presence. I have reviewed the chart and agree that the record reflects my personal performance and is accurate and complete  Electronically signed: Jagdish Clement NP

## 2021-07-16 NOTE — PROGRESS NOTE ADULT - SUBJECTIVE AND OBJECTIVE BOX
[facility]   [phone]  RENAL FOLLOW UP NOTE  --------------------------------------------------------------------------------  Chief Complaint:    24 hour events/subjective:    seen earlier  states has abd pain no worsening sob     PAST HISTORY  --------------------------------------------------------------------------------  No significant changes to PMH, PSH, FHx, SHx, unless otherwise noted    ALLERGIES & MEDICATIONS  --------------------------------------------------------------------------------  Allergies    adhesives (Rash)  No Known Drug Allergies    Intolerances      Standing Inpatient Medications  aspirin enteric coated 81 milliGRAM(s) Oral daily  atorvastatin 80 milliGRAM(s) Oral at bedtime  chlorhexidine 2% Cloths 1 Application(s) Topical daily  DOBUTamine Infusion 5 MICROgram(s)/kG/Min IV Continuous <Continuous>  heparin  Infusion 800 Unit(s)/Hr IV Continuous <Continuous>  lidocaine   Patch 1 Patch Transdermal daily  melatonin 5 milliGRAM(s) Oral at bedtime  multivitamin 1 Tablet(s) Oral daily  norepinephrine Infusion 0.05 MICROgram(s)/kG/Min IV Continuous <Continuous>  polyethylene glycol 3350 17 Gram(s) Oral daily  senna 2 Tablet(s) Oral at bedtime  sodium bicarbonate 650 milliGRAM(s) Oral three times a day    PRN Inpatient Medications  acetaminophen   Tablet .. 650 milliGRAM(s) Oral every 6 hours PRN  aluminum hydroxide/magnesium hydroxide/simethicone Suspension 30 milliLiter(s) Oral every 6 hours PRN  sodium chloride 0.9% lock flush 10 milliLiter(s) IV Push every 1 hour PRN      REVIEW OF SYSTEMS  --------------------------------------------------------------------------------    Gen: denies /chills,  CVS: denies chest pain/palpitations  Resp: + SOB/Cough -   GI: Denies N/V/Abd pain +   : Denies dysuria        VITALS/PHYSICAL EXAM  --------------------------------------------------------------------------------  T(C): 37 (07-16-21 @ 12:00), Max: 37.3 (07-15-21 @ 23:00)  HR: 96 (07-16-21 @ 17:30) (80 - 108)  BP: --  RR: 20 (07-16-21 @ 17:00) (10 - 28)  SpO2: 97% (07-16-21 @ 17:30) (96% - 99%)  Wt(kg): --        07-15-21 @ 07:01  -  07-16-21 @ 07:00  --------------------------------------------------------  IN: 2315.3 mL / OUT: 790 mL / NET: 1525.3 mL    07-16-21 @ 07:01  -  07-16-21 @ 19:06  --------------------------------------------------------  IN: 918.6 mL / OUT: 390 mL / NET: 528.6 mL      Physical Exam:  	      Gen: pale appearing   	 - JVD  	Pulm: decrease bs  no rales or ronchi or wheezing  	CV: RRR, S1S2; no rub III/VI M   	Abd: +BS, soft, nontender/nondistended  	: No suprapubic tenderness  	UE: Warm, no cyanosis  no clubbing,  no edema  	LE: Warm, no cyanosis  no clubbing, no edema  	Neuro: alert and oriented. speech coherent     LABS/STUDIES  --------------------------------------------------------------------------------              9.3    8.08  >-----------<  169      [07-16-21 @ 13:03]              28.1     136  |  107  |  38  ----------------------------<  122      [07-16-21 @ 03:32]  4.2   |  18  |  1.92        Ca     8.5     [07-16-21 @ 03:32]      Mg     1.8     [07-16-21 @ 03:32]      Phos  2.3     [07-16-21 @ 03:32]    TPro  4.9  /  Alb  2.5  /  TBili  1.1  /  DBili  x   /  AST  41  /  ALT  41  /  AlkPhos  60  [07-16-21 @ 03:32]    PT/INR: PT 12.1 , INR 1.01       [07-16-21 @ 03:32]  PTT: 85.4       [07-16-21 @ 03:32]      Creatinine Trend:  SCr 1.92 [07-16 @ 03:32]  SCr 2.73 [07-15 @ 01:38]  SCr 2.99 [07-14 @ 20:10]  SCr 3.16 [07-14 @ 10:15]  SCr 3.27 [07-14 @ 01:22]                  PTH -- (Ca 9.2)      [07-13-21 @ 23:49]   193  Vitamin D (25OH) 47.6      [07-13-21 @ 23:49]  TSH 2.32      [07-12-21 @ 08:24]  Lipid: chol 105, TG 89, HDL 51, LDL --      [07-12-21 @ 08:26]

## 2021-07-16 NOTE — PROGRESS NOTE ADULT - PROBLEM SELECTOR PLAN 1
- can try slowly weaning  to 4 mcg/kg/min  - trend hemodynamics and markers of end organ function (LFTs, lactate, Mv02)  - CVP 8 this morning, would keep net even to slightly net negative  - wean levophed for target SBP >/= 90  - would check ScvO2 and simultaneous MvO2 to see if they correlate and then d/c swan but leave intro to monitor CVP and central venous saturations

## 2021-07-16 NOTE — PROGRESS NOTE ADULT - ASSESSMENT
87 yo lady PMHx of HTN and paroxysmal Afib on coumadin who was transferred to [facility] ED from [place] ED where she presented with 2 week hx of intermittent chest pain, with acute exacerbation of chest pain radiating to back  associated with single episode of syncope at home on day of admission. 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      1- CKD III/IV with CRISSY   2- a fib  3- chest pains  on admission   4- CHF  5- aortic stenosis   6- hypercalcemia       crissy in setting of contrast nephropathy  pt also with severe AS   off ivf   dobutamine support  cr is improving   structural heart team eval for TAVR   strict I/O  suspect shpt due to underlying ckd

## 2021-07-16 NOTE — PROGRESS NOTE ADULT - SUBJECTIVE AND OBJECTIVE BOX
Subjective:  - NAEO  - states she feels well this morning, although still has not had a BM since PTA    Medications:  acetaminophen   Tablet .. 650 milliGRAM(s) Oral every 6 hours PRN  aluminum hydroxide/magnesium hydroxide/simethicone Suspension 30 milliLiter(s) Oral every 6 hours PRN  aspirin enteric coated 81 milliGRAM(s) Oral daily  atorvastatin 80 milliGRAM(s) Oral at bedtime  chlorhexidine 2% Cloths 1 Application(s) Topical daily  DOBUTamine Infusion 4 MICROgram(s)/kG/Min IV Continuous <Continuous>  heparin  Infusion 800 Unit(s)/Hr IV Continuous <Continuous>  lidocaine   Patch 1 Patch Transdermal daily  melatonin 5 milliGRAM(s) Oral at bedtime  multivitamin 1 Tablet(s) Oral daily  norepinephrine Infusion 0.05 MICROgram(s)/kG/Min IV Continuous <Continuous>  polyethylene glycol 3350 17 Gram(s) Oral daily  senna 2 Tablet(s) Oral at bedtime  sodium bicarbonate 650 milliGRAM(s) Oral three times a day  sodium chloride 0.9% lock flush 10 milliLiter(s) IV Push every 1 hour PRN      ICU Vital Signs Last 24 Hrs  T(C): 37, Max: 37.7 (07-15 @ 19:00)  HR: 90 (88 - 108)  BP: --  BP(mean): --  ABP: 90/52 (80/40 - 158/134)  ABP(mean): 68 (56 - 226)  RR: 22 (10 - 28)  SpO2: 97% (82% - 99%)    Weight in k.7 (07-15-21)  Weight in k.1 (21)      I&O's Summary Last 24 Hrs    IN: 2315.3 mL / OUT: 790 mL / NET: 1525.3 mL      Tele: Afib 90-100s    Physical Exam:    General: No distress. Comfortable.  HEENT: EOM intact.  Neck: JVP approx 6-8 cm H2O  Respiratory: Clear to auscultation bilaterally  CV: Irregularly irregular. Normal S1 and S2. III/VI SM LLSB. Radial pulses normal.  Abdomen: Soft, non-distended, non-tender  Skin: No skin lesions  Extremities: Warm, no edema  Neurology: Non-focal, alert and oriented times three.   Psych: Affect normal    Labs:    ( 21 @ 13:03 )               9.3    8.08  )--------( 169                  28.1     ( 21 @ 03:32 )     136  |  107  |  38  ---------------------<  122  4.2  |  18  |  1.92    Ca 8.5  Phos 2.3  Mg 1.8    ( 21 @ 03:32 )  TPro  4.9  /  Alb  2.5  /  TBili  1.1  /  DBili  x   /  AST  41  /  ALT  41  /  AlkPhos  60  ( 07-15-21 @ 01:38 )  TPro  4.9  /  Alb  2.7  /  TBili  1.3  /  DBili  x   /  AST  59  /  ALT  43  /  AlkPhos  62    PTT/PT/INR - ( 21 @ 03:32 )  PTT: 85.4 sec / PT: 12.1 sec / INR: 1.01 ratio    ( 21 @ 01:22 )  TropHS >94338 /    / CKMB x      ( 21 @ 18:04 )  TropHS >96753 /    / CKMB x        Serum Pro-Brain Natriuretic Peptide: 37629 (21 @ 21:34)    Oxygen Saturation, Mixed: 65 (21 @ 03:18)    VBG - ( 21 @ 12:59 )  pH: 7.38  / pCO2: 40    / pO2: 30    / Oxygen saturation: 48        ABG - ( 21 @ 03:18 )  pH: 7.47  / pCO2: 30    / pO2: 113   / HCO3: 21    / Base Excess: -1.2  / SaO2: 99

## 2021-07-16 NOTE — PROGRESS NOTE ADULT - ATTENDING COMMENTS
Briefly, 87 yo F w/ h/o HTN and paroxysmal Afib on coumadin who presented with chest pain and syncope at home to [place] on , found to have trop I>40; underwent CTA negative for PE and transferred to NS. Initially normotensive and labs revealed hsTrop >10,000, lactate 2.5. EKG with Afib with RBBB, with Q waves on lateral leads. Bedside TTE with reduced EF ~15-20%. Repeat CT chest angio to r/o aortic dissection was negative for significant dissection. Underwent LHC  which showed non-obstructive coronaries. TTE with severe LV dysfunction (segmental with basal contractility and apical akinesis) with possibly severe AS (mean gradient 30, TINY 0.5). Became hypotensive on  (notably received metoprolol day prior). Started on /levophed and received IVF. On exam, frail appearing, NAD, JVP approx 6 cm, grade II/VI systolic murmur, nontender abdomen, no pedal edema. Labs reviewed - Hb 10 (stable)), BUN/Cr 47/2.73 (maxwell 1.8), lactate negative, central sat 67% (estimated CI 3.1). Unclear etiology of severe LV dysfunction with elevated troponins in absence of non-obstructive coronaries - possibly Takotsubo's vs. embolic event which recanalized  - c/w ; wean levophed as tolerated for SBP 90  - goal CVP 6-8; appears at goal   - possible plan for BAV; followed by structural   - prognosis guarded

## 2021-07-16 NOTE — CHART NOTE - NSCHARTNOTEFT_GEN_A_CORE
Nutrition Follow Up Note  Patient seen for: initial malnutrition follow up. Chart reviewed and events noted.     Pt is a "85 yo lady PMHx of HTN and paroxysmal Afib on coumadin who was transferred to [facility] ED from [place] ED where she presented with 2 week hx of intermittent atypical chest pain, with acute exacerbation of chest pain radiating to back for the last 2 days, also associated with single episode of syncope at home today. Pt with Late presentation inferolateral STEMI, CRISSY likely in setting of BLANCA, Anemia"    Source: [x] Patient       [x] Medical Record        [] RN        [] Family at bedside       [] Other:    -If unable to interview patient: [] Trach/Vent/BiPAP  [] Disoriented/confused/inappropriate to interview    Diet Order:   Diet, Mechanical Soft:   Low Sodium  Supplement Feeding Modality:  Oral  Ensure Enlive Cans or Servings Per Day:  2       Frequency:  Daily (-)    - Is current order appropriate/adequate? [x] Yes  []  No:     - PO intake :   [] >75%  Adequate    [x] 50-75%  Fair       [] <50%  Poor    - Nutrition-related concerns:      -Pt reports appetite and intake are fair 2/2 dislike of institutional foods. Pt reports accepting Ensure Enlive, however often supplement is thrown out before pt able to finish. No known intolerances to current diet texture.       -Slightly elevated blood glucose noted during admission, A1C 5.7%         GI: No known recent N/V or diarrhea. Pt endorses constipation. Pt states last BM PTA, however per RN flowsheet last BM .   Bowel Regimen? [x] Yes   [] No    Weights:   Daily Weight in k.7 (07-15), 63.1 ()  Dosing wt: 59.1 kG  Wt currently stable. RD will continue to trend as new wts available/able.     Nutritionally Pertinent MEDICATIONS  (STANDING):  atorvastatin  bisacodyl Suppository  DOBUTamine Infusion  multivitamin  norepinephrine Infusion  polyethylene glycol 3350  senna  sodium bicarbonate    Pertinent Labs:  @ 03:32: Na 136, BUN 38<H>, Cr 1.92<H>, <H>, K+ 4.2, Phos 2.3<L>, Mg 1.8, Alk Phos 60, ALT/SGPT 41, AST/SGOT 41<H>  -Low Phos noted, s/p Na Phosphate 7/16    A1C with Estimated Average Glucose Result: 5.7 % (21 @ 08:27)    Skin per nursing documentation: No pressure injuries noted.  Edema per nursing documentation: None noted.     Estimated Needs:   [x] no change since previous assessment  Based on dosing weight (59 kG)   Estimated Energy Needs: (25-30 kcals/kG) 5232-7545 kcals  Estimated Protein Needs: (1.0-1.3 gm/kG) 59-77 gm  Defer free water to team    Previous Nutrition Diagnosis: Malnutrition Moderate, chronic  Nutrition Diagnosis is: [x] ongoing  [] resolved [] not applicable     Nutrition Care Plan:  [x] In Progress  [] Achieved  [] Not applicable    New Nutrition Diagnosis: [x] Not applicable    Nutrition Interventions:     Education Provided   [x] Yes:  [] No: RD reinforced need for adequate protein-energy intake. Pt also asking for diet liberalization, RD explained need for Low Na restriction. Pt made aware RD to remain available.     Recommendations:      1) Continue Mechanical Soft: Low Sodium diet. Consistency deferred to SLP and provider. RD remains available for diet changes as needed/able.  2) Change supplement to Glucerna x2 daily.   3) As medically feasible, continue to provide micronutrients.   -Continue to trend electrolytes and replete and needed/able.    Monitoring and Evaluation:   Continue to monitor nutritional intake, tolerance to diet prescription, weights, labs, skin integrity    RD remains available upon request and will follow up per protocol  Sally Bear, MS, RD, CDN Pager #[phone]

## 2021-07-16 NOTE — PROGRESS NOTE ADULT - ATTENDING COMMENTS
87 yo F with HTN, chronic atrial fibrillation with possible stress-induced cardiomyopathy with severe aortic stenosis. Patient remains in shock likely  cardiogenic, requiring both inotropic and pressor support.  CVP 6 today on rounds. Will attempt to wean  to 4 and re-assess with ultimate hope to wean pressors, though I suspect she will ultimately need BAV/TAVR to improve her hemodynamics.  Appreciate structural heart evaluation.  Creatinine improving.      H/H decreased from prior, no obvious signs of bleeding, will continue to monitor closely.  On heparin drip for Afib  FS no active issues  no BM for few days, will give dulcolax

## 2021-07-16 NOTE — PROGRESS NOTE ADULT - ASSESSMENT
86 year old woman with PMHx of HTN and paroxysmal Afib on coumadin who presented with CP and syncope initially to [facility] on 7/11, found to have elevated troponin with CTA negative for PE and transferred to [facility] for further evaluation. TTE with new finding of severely depressed LV systolic function and what appears to be severe AS. She underwent ischemic evaluation by [facility] on 7/12 revealing nonobstructive CAD. She developed hypotension on 7/13 accompanied by CRISSY/Transaminitis and was transferred to CICU with pressor and inotropic support. Placement of PAC demonstrated low R sided filling pressures and preserved CO on high dose .     Clinically improving with inotropic support and gentle IV fluid hydration with improving markers of end organ function. Unclear precipitant of acute decline in LV systolic function but suspect Takotsubo vs embolic event.     Cardiac Studies  EKG: afib HR 94, RBBB  TTE 7/14 LVEF 24%, takotsubo, Stage III DD, nl RV size with RVSD, mod MR, severe AS, mod TR, est RVSP 52 mm Hg  [facility] 7/12 minor luminal irregularities    Hemodynamics  7/16 ( 5, Levo 0.12) CVP 8, PA 41/12/22, unable to wedge, MvO2 65%, CO/CI (F) 4.6/2.8, MAP 68, SVR 1043 dsc  7/15 ( 5, Levo 0.3) CVP 4, PA 47/13/26, PCWP 19, Mv02 67%, CO/CI (F) 4.6/2.8, SVR 1078 dsc, MAP 70, HR 95  7/14: CVP 1, central sat 58%, CI 2.2

## 2021-07-16 NOTE — PROGRESS NOTE ADULT - SUBJECTIVE AND OBJECTIVE BOX
c/o vague abd discomfort - abd exam unremarkable. no bowel movement x 7 days. dulcolax suppository given     on 0.11-0.12 mcg/kg/min and 5 mcg/kg/min dobutamine     received fluid yesterday - BUN/Cr downtrending     Hgb 9.3 - no overt s/s bleeding     swan repositioned 2x - not in place - DCed     all other ROS negative except as stated above     PHYSICAL EXAM:   General: no acute distress. frail appearing   HEENT: head atraumatic, normocephalic. oral mucosa moist   Cardiovascular: S1, S2, RRR, no JVD, pulses 2+ UE/LE b/l. warm, well perfused   Respiratory: CTA b/l. good rate and effort   Gastrointestinal: soft, non-tender, non distended, + BS all 4 qadurants   Genitourinary: clear yellow urine in grove catheter. no suprapubic   Integumentary: ecchymoses L AC, increased skin turgor   Musculoskeletal: moving all 4 extremities - strength symmetric b/l   Hematologic/Lymphatic: no bleeding noted   Endocrine: no gross thyromegaly   Neurologic: A, A, O x 4. PERRL. no focal or lateralizing deficits noted   Psychologic: mildly anxious appearing                         9.3    8.08  )-----------( 169      ( 16 Jul 2021 13:03 )             28.1     07-16    136  |  107  |  38<H>  ----------------------------<  122<H>  4.2   |  18<L>  |  1.92<H>    Ca    8.5      16 Jul 2021 03:32  Phos  2.3     07-16  Mg     1.8     07-16    TPro  4.9<L>  /  Alb  2.5<L>  /  TBili  1.1  /  DBili  x   /  AST  41<H>  /  ALT  41  /  AlkPhos  60  07-16    LIVER FUNCTIONS - ( 16 Jul 2021 03:32 )  Alb: 2.5 g/dL / Pro: 4.9 g/dL / ALK PHOS: 60 U/L / ALT: 41 U/L / AST: 41 U/L / GGT: x           PT/INR - ( 16 Jul 2021 03:32 )   PT: 12.1 sec;   INR: 1.01 ratio       PTT - ( 16 Jul 2021 03:32 )  PTT:85.4 sec    ICU Vital Signs Last 24 Hrs  T(C): 37 (16 Jul 2021 07:00), Max: 37.7 (15 Jul 2021 19:00)  T(F): 98.6 (16 Jul 2021 07:00), Max: 99.9 (15 Jul 2021 19:00)  HR: 102 (16 Jul 2021 11:00) (88 - 106)  BP: --  BP(mean): --  ABP: 102/54 (16 Jul 2021 11:00) (80/40 - 158/134)  MEDICATIONS  (STANDING):  aspirin enteric coated 81 milliGRAM(s) Oral daily  atorvastatin 80 milliGRAM(s) Oral at bedtime  chlorhexidine 2% Cloths 1 Application(s) Topical daily  DOBUTamine Infusion 4 MICROgram(s)/kG/Min (7.09 mL/Hr) IV Continuous <Continuous>  heparin  Infusion 800 Unit(s)/Hr (8 mL/Hr) IV Continuous <Continuous>  melatonin 5 milliGRAM(s) Oral at bedtime  multivitamin 1 Tablet(s) Oral daily  norepinephrine Infusion 0.05 MICROgram(s)/kG/Min (5.54 mL/Hr) IV Continuous <Continuous>  polyethylene glycol 3350 17 Gram(s) Oral daily  senna 2 Tablet(s) Oral at bedtime  sodium bicarbonate 650 milliGRAM(s) Oral three times a day    MEDICATIONS  (PRN):  acetaminophen   Tablet .. 650 milliGRAM(s) Oral every 6 hours PRN Temp greater or equal to 38C (100.4F), Mild Pain (1 - 3), Moderate Pain (4 - 6)  aluminum hydroxide/magnesium hydroxide/simethicone Suspension 30 milliLiter(s) Oral every 6 hours PRN Dyspepsia  sodium chloride 0.9% lock flush 10 milliLiter(s) IV Push every 1 hour PRN Pre/post blood products, medications, blood draw, and to maintain line patency  ABP(mean): 74 (16 Jul 2021 11:00) (56 - 226)  RR: 22 (16 Jul 2021 11:00) (10 - 28)  SpO2: 98% (16 Jul 2021 11:00) (82% - 99%)    I&O's Summary    15 Jul 2021 07:01  -  16 Jul 2021 07:00  --------------------------------------------------------  IN: 2315.3 mL / OUT: 790 mL / NET: 1525.3 mL    16 Jul 2021 07:01  -  16 Jul 2021 14:27  --------------------------------------------------------  IN: 486.7 mL / OUT: 165 mL / NET: 321.7 mL

## 2021-07-17 NOTE — PROGRESS NOTE ADULT - SUBJECTIVE AND OBJECTIVE BOX
CICU DAY NOTE     CICU DAY NOTE    Admission date: 21  Chief complaint/ Diagnosis: Late presentation MI and severe AS   HPI: 87 yo lady PMHx of HTN and paroxysmal Afib on coumadin who was transferred to [facility] ED from [place] ED where she presented with 2 week hx of intermittent atypical chest pain, with acute exacerbation of chest pain radiating to back for the last 2 days, also associated with single episode of syncope at home today.   In the [facility] ED, vitals - HR 80-90s Afib, -130s, RR 20, and SpO2 98% on 2L NC. Unremarkable exam reported. Labs c/w CRISSY on CKD (Cr 1.9 compared to 1.4 in 2019), with trop >40, pBNP 8543, and CKMB 94.5. CT chest angio (90cc contrast) without evidence of PE but inconclusive study on aortic dissection. Was transferred to [facility] ED for further management.  At the [facility] ED, vitals - HR 90-100s Afib, /60 at the bedside, RR 20, and SpO2 95% on room air. On physical exam, with JVD ~8cm, without pertinent cardiac or respiratory exam findings, without associated LE edema. Labs c/w CRISSY, hsTrop >10,000, lactate 2.5. EKG with Afib with RBBB, with Q waves on lateral leads. Bedside TTE with reduced EF ~15-20%. Repeat CT chest angio to r/o aortic dissection was negative for significant dissection.    Interval history: Acute Pulm edema this morning  s/p Lasix 40 IVP X1 and on BIPAP 8/10/50%  Currently on @5, Levo@0.3    REVIEW OF SYSTEMS  Denies CP, Palpitation[ x ] All other systems negative    MEDICATIONS  (STANDING)  aspirin enteric coated 81 milliGRAM(s) Oral daily  atorvastatin 80 milliGRAM(s) Oral at bedtime  chlorhexidine 2% Cloths 1 Application(s) Topical daily  DOBUTamine Infusion 5 MICROgram(s)/kG/Min (8.87 mL/Hr) IV Continuous <Continuous>  heparin  Infusion 800 Unit(s)/Hr (8 mL/Hr) IV Continuous <Continuous>  lidocaine   Patch 1 Patch Transdermal daily  melatonin 5 milliGRAM(s) Oral at bedtime  multivitamin 1 Tablet(s) Oral daily  norepinephrine Infusion 0.05 MICROgram(s)/kG/Min (5.54 mL/Hr) IV Continuous <Continuous>  polyethylene glycol 3350 17 Gram(s) Oral daily  QUEtiapine 12.5 milliGRAM(s) Oral at bedtime  senna 2 Tablet(s) Oral at bedtime  sodium bicarbonate 650 milliGRAM(s) Oral three times a day    MEDICATIONS  (PRN):  acetaminophen   Tablet .. 650 milliGRAM(s) Oral every 6 hours PRN Temp greater or equal to 38C (100.4F), Mild Pain (1 - 3), Moderate Pain (4 - 6)  aluminum hydroxide/magnesium hydroxide/simethicone Suspension 30 milliLiter(s) Oral every 6 hours PRN Dyspepsia  sodium chloride 0.9% lock flush 10 milliLiter(s) IV Push every 1 hour PRN Pre/post blood products, medications, blood draw, and to maintain line patency    PAST MEDICAL & SURGICAL HISTORY:  Afib  Hypertension  Hyperparathyroidism  Osteoporosis  Dyslipidemia  Macular degeneration  Temporal arteritis  Spinal stenosis  Osteoarthritis  History of cholecystectomy  H/O nasal septoplasty  History of lumbar laminectomy with microdiskectomy  H/O parathyroidectomy    FAMILY HISTORY: Family history of brain cancer (Child)    Allergy:   adhesives (Rash)  No Known Drug Allergies    ICU Vital Signs Last 24 Hrs  T(C): 36.4 (Max: 37.2)  HR: 104 (80 - 122)  ABP: 80/48 (66/34 - 112/68)  RR: 25 (20 - 53)  SpO2: 100% (75% - 100%) on BIPAP     I&O's Summary  IN: 1916.5 mL / OUT: 1235 mL(grove 50-60cc/hr) / NET: 681.5 mL    PHYSICAL EXAM  Appearance: Normal, NAD  HEAD:   Normocephalic  EYES: PERRLA, conjunctiva and sclera clear  NECK: Supple,+ JVD  CHEST/LUNG: Fine crackles bilaterally; No wheezing  HEART: Normal S1, S2. No murmurs, rubs, or gallops  ABDOMEN: + Bowel sounds, Soft, NT, ND   EXTREMITIES:  2+ Peripheral Pulses, No clubbing, cyanosis, or edema  NEUROLOGY: non-focal, aaox3  SKIN: left upper/lower arm extensive ecchymosis soft     Interpretation of Telemetry:                         8.5 <9.3<9.3   7.99<8.08  )-----------( 157                            26.2     135  |  106  |  28<H>  ----------------------------<  106<H>  4.0   |  17<L>  |  1.40<1.92<2.73    Ca    8.0<L>     Phos  2.7    Mg     1.8         TPro  4.7<L>  /  Alb  2.4<L>  /  TBili  1.0  /  DBili  x   /  AST  30  /  ALT  34  /  AlkPhos  61              ABG - ( 2021 06:26 )  pH, Arterial: 7.42  pH, Blood: x     /  pCO2: 28    /  pO2: 178   / HCO3: 18    / Base Excess: -4.9  /  SaO2: 100                 CAPILLARY BLOOD GLUCOSE   CICU DAY NOTE    Admission date: 21  Chief complaint/ Diagnosis: Late presentation MI and severe AS   HPI: 87 yo lady PMHx of HTN and paroxysmal Afib on coumadin who was transferred to [facility] ED from [place] ED where she presented with 2 week hx of intermittent atypical chest pain, with acute exacerbation of chest pain radiating to back for the last 2 days, also associated with single episode of syncope at home today.   In the [facility] ED, vitals - HR 80-90s Afib, -130s, RR 20, and SpO2 98% on 2L NC. Unremarkable exam reported. Labs c/w CRISSY on CKD (Cr 1.9 compared to 1.4 in 2019), with trop >40, pBNP 8543, and CKMB 94.5. CT chest angio (90cc contrast) without evidence of PE but inconclusive study on aortic dissection. Was transferred to [facility] ED for further management.  At the [facility] ED, vitals - HR 90-100s Afib, /60 at the bedside, RR 20, and SpO2 95% on room air. On physical exam, with JVD ~8cm, without pertinent cardiac or respiratory exam findings, without associated LE edema. Labs c/w CRISSY, hsTrop >10,000, lactate 2.5. EKG with Afib with RBBB, with Q waves on lateral leads. Bedside TTE with reduced EF ~15-20%. Repeat CT chest angio to r/o aortic dissection was negative for significant dissection.    Interval history: Acute Pulm edema this morning  s/p Lasix 40 IVP X1 and on BIPAP 8/10/50%  Currently on @5, Levo@0.3    REVIEW OF SYSTEMS  Denies CP, Palpitation[ x ] All other systems negative    MEDICATIONS  (STANDING)  aspirin enteric coated 81 milliGRAM(s) Oral daily  atorvastatin 80 milliGRAM(s) Oral at bedtime  chlorhexidine 2% Cloths 1 Application(s) Topical daily  DOBUTamine Infusion 5 MICROgram(s)/kG/Min (8.87 mL/Hr) IV Continuous <Continuous>  heparin  Infusion 800 Unit(s)/Hr (8 mL/Hr) IV Continuous <Continuous>  lidocaine   Patch 1 Patch Transdermal daily  melatonin 5 milliGRAM(s) Oral at bedtime  multivitamin 1 Tablet(s) Oral daily  norepinephrine Infusion 0.05 MICROgram(s)/kG/Min (5.54 mL/Hr) IV Continuous <Continuous>  polyethylene glycol 3350 17 Gram(s) Oral daily  QUEtiapine 12.5 milliGRAM(s) Oral at bedtime  senna 2 Tablet(s) Oral at bedtime  sodium bicarbonate 650 milliGRAM(s) Oral three times a day    MEDICATIONS  (PRN):  acetaminophen   Tablet .. 650 milliGRAM(s) Oral every 6 hours PRN Temp greater or equal to 38C (100.4F), Mild Pain (1 - 3), Moderate Pain (4 - 6)  aluminum hydroxide/magnesium hydroxide/simethicone Suspension 30 milliLiter(s) Oral every 6 hours PRN Dyspepsia  sodium chloride 0.9% lock flush 10 milliLiter(s) IV Push every 1 hour PRN Pre/post blood products, medications, blood draw, and to maintain line patency    PAST MEDICAL & SURGICAL HISTORY:  Afib  Hypertension  Hyperparathyroidism  Osteoporosis  Dyslipidemia  Macular degeneration  Temporal arteritis  Spinal stenosis  Osteoarthritis  History of cholecystectomy  H/O nasal septoplasty  History of lumbar laminectomy with microdiskectomy  H/O parathyroidectomy    FAMILY HISTORY: Family history of brain cancer (Child)    Allergy:   adhesives (Rash)  No Known Drug Allergies    ICU Vital Signs Last 24 Hrs  T(C): 36.4 (Max: 37.2)  HR: 104 (80 - 122)  ABP: 80/48 (66/34 - 112/68)  RR: 25 (20 - 53)  SpO2: 100% (75% - 100%) on BIPAP     I&O's Summary  IN: 1916.5 mL / OUT: 1235 mL(grove 50-60cc/hr) / NET: 681.5 mL    PHYSICAL EXAM  Appearance: Normal, NAD  HEAD:   Normocephalic  EYES: PERRLA, conjunctiva and sclera clear  NECK: Supple,+ JVD  CHEST/LUNG: Fine crackles bilaterally; No wheezing  HEART: Normal S1, S2. No murmurs, rubs, or gallops  ABDOMEN: + Bowel sounds, Soft, NT, ND   EXTREMITIES:  2+ Peripheral Pulses, No clubbing, cyanosis, or edema  NEUROLOGY: non-focal, aaox3  SKIN: left upper/lower arm extensive ecchymosis soft     Interpretation of Telemetry:                         8.5 <9.3<9.3   7.99<8.08  )-----------( 157                            26.2     135  |  106  |  28<H>  ----------------------------<  106<H>  4.0   |  17<L>  |  1.40<1.92<2.73    Ca    8.0<L>     Phos  2.    Mg     1.8       TPro  4.7<L>  / Alb  2.4<L>  /  TBili  1.0  /  DBili  x   /  AST  30  /  ALT  34  /  AlkPhos  61  07    ABG - ( 2021 06:26 )  pH, Arterial: 7.42 /  pCO2: 28    /  pO2: 178   / HCO3: 18    / Base Excess: -4.9  /  SaO2: 100

## 2021-07-17 NOTE — PROGRESS NOTE ADULT - SUBJECTIVE AND OBJECTIVE BOX
Subjective  Patient/earlier this morning for which she was given IV Lasix with improvement in her symptoms.  The time of examination she is laying at 30 degree angle.  Feels very weak/fatigued not having any abdominal discomfort.  Has had markedly decreased appetite and feels frustrated by her lack of progress.  Denies any chest pain/tightness discomfort or shortness of breath.    Review of systems  14 point review of systems otherwise unremarkable except what described above in the history of present illness    Physical examination   No apparent distress, alert and oriented x3, appropriate affect  IJ introducer is  Patient on dobutamine at 5 and Levophed  Pupils equal round reactive to light, extraocular is intact, dry mucous membranes, no erythema exudate tonsillar hypertrophy and JVD does not appear to be elevated, supple, no lymphadenopathy  Clear to auscultation bilaterally with no wheezing rhonchi crackles  Regular rate and rhythm with 3 out of 6 crescendo decrescendo murmur heard less of the right upper sternal border line positive bowel sound, soft, nontender, nondistended, no masses guarding or rebound tenderness line no clubbing cyanosis or edema  Moving all extremity spontaneously  2+ DP/PT pulses    Assessment/plan  Critical aortic valve stenosis with severe LV dysfunction and nonobstructive coronary artery disease  --The patient is in cardiogenic shock and went into acute kidney injury following contrast loads during her recent hospitalization.  The patient's kidney function is gradually improving however she continues to be on dobutamine and Levophed with difficulty titrating her pressor/inotrope.  --The patient continues to be unstable in nature it is recommended that she undergo retrograde aortic balloon valvuloplasty that will be used as a bridge for a TAVR procedure.  Indications and details of the procedure reviewed with the patient.  Benefits and risk were gone over.  Risks include are but not limited to infection, bleeding, arrhythmia, TIA/stroke, hemodynamic instability, vascular injury, need for urgent surgery and death.  --Discussed with the patient the differences between valvuloplasty and the TAVR procedure.  She wishes to undergo the TAVR procedure but understands that a valvuloplasty may need to be performed to help her medical state.  If however, she is clinically doing better with stabilization of her kidney function and hemodynamics would recommend that a TAVR CTA be performed in 7/19/2021.    All questions and concerns of the patient were addressed.    Findings and plan were discussed with CICU team/Dr. George.        T(C): 36.4 (07-17-21 @ 04:00), Max: 37.2 (07-16-21 @ 18:00)  HR: 98 (07-17-21 @ 14:30) (80 - 122)  BP: --  RR: 29 (07-17-21 @ 14:30) (19 - 55)  SpO2: 94% (07-17-21 @ 14:30) (75% - 100%)  Wt(kg): --  07-16 @ 07:01  -  07-17 @ 07:00  --------------------------------------------------------  IN: 1916.5 mL / OUT: 1235 mL / NET: 681.5 mL    07-17 @ 07:01  -  07-17 @ 15:07  --------------------------------------------------------  IN: 411.5 mL / OUT: 1395 mL / NET: -983.5 mL        MEDICATIONS  (STANDING):  aspirin enteric coated 81 milliGRAM(s) Oral daily  atorvastatin 80 milliGRAM(s) Oral at bedtime  chlorhexidine 2% Cloths 1 Application(s) Topical daily  DOBUTamine Infusion 5 MICROgram(s)/kG/Min (8.87 mL/Hr) IV Continuous <Continuous>  heparin  Infusion 800 Unit(s)/Hr (8 mL/Hr) IV Continuous <Continuous>  lidocaine   Patch 1 Patch Transdermal daily  melatonin 5 milliGRAM(s) Oral at bedtime  multivitamin 1 Tablet(s) Oral daily  norepinephrine Infusion 0.05 MICROgram(s)/kG/Min (5.54 mL/Hr) IV Continuous <Continuous>  polyethylene glycol 3350 17 Gram(s) Oral daily  QUEtiapine 12.5 milliGRAM(s) Oral at bedtime  senna 2 Tablet(s) Oral at bedtime  sodium bicarbonate 650 milliGRAM(s) Oral three times a day    MEDICATIONS  (PRN):  acetaminophen   Tablet .. 650 milliGRAM(s) Oral every 6 hours PRN Temp greater or equal to 38C (100.4F), Mild Pain (1 - 3), Moderate Pain (4 - 6)  aluminum hydroxide/magnesium hydroxide/simethicone Suspension 30 milliLiter(s) Oral every 6 hours PRN Dyspepsia  sodium chloride 0.9% lock flush 10 milliLiter(s) IV Push every 1 hour PRN Pre/post blood products, medications, blood draw, and to maintain line patency      Home Medications:  Coumadin 5 mg oral tablet: 1 tab(s) orally once a day (11 Jul 2021 17:16)  lisinopril 20 mg oral tablet: 1 tab(s) orally once a day (11 Jul 2021 17:16)  meclizine 12.5 mg oral tablet: 1 tab(s) orally once a day (11 Jul 2021 17:16)  metoprolol succinate 25 mg oral tablet, extended release: 1 tab(s) orally once a day (11 Jul 2021 17:16)  simvastatin 40 mg oral tablet: 1 tab(s) orally once a day (at bedtime) (11 Jul 2021 17:16)                      8.5    7.99  )-----------( 157      ( 17 Jul 2021 03:32 )             26.2   07-17    135  |  106  |  28<H>  ----------------------------<  106<H>  4.0   |  17<L>  |  1.40<H>    Ca    8.0<L>      17 Jul 2021 03:32  Phos  2.7     07-17  Mg     1.8     07-17    TPro  4.7<L>  /  Alb  2.4<L>  /  TBili  1.0  /  DBili  x   /  AST  30  /  ALT  34  /  AlkPhos  61  07-17  PT/INR - ( 16 Jul 2021 03:32 )   PT: 12.1 sec;   INR: 1.01 ratio         PTT - ( 17 Jul 2021 03:32 )  PTT:74.9 sec

## 2021-07-17 NOTE — PROGRESS NOTE ADULT - ASSESSMENT
Plan:  ====================== NEUROLOGY=====================  AOx3  - no acute issues   - Tylenol PRN for analgesia/fever   - sleep regimen with melatonin       acetaminophen   Tablet .. 650 milliGRAM(s) Oral every 6 hours PRN Temp greater or equal to 38C (100.4F), Mild Pain (1 - 3), Moderate Pain (4 - 6)  melatonin 5 milliGRAM(s) Oral at bedtime    ==================== RESPIRATORY======================  Stable on RA, SpO2 97%  - continue pulse ox monitoring, follow ABGs       ====================CARDIOVASCULAR==================  Late presentation inferolateral STEMI c/b cardiogenic shock  - LHC revealing clear cors  - presentation possibly 2/2 tako vs severe AS vs myopericarditis   - cont ASA, statin   - c/w  and levo   - continue invasive hemodynamic monitoring via TLC and Ana Laura   - monitor CVPs, VBGs and uptitrate inotrope as needed   - TTE : EF 24%, mod MR, LV dysfunction, decreased RV function   - hep gtt     Severe AS  - structural team following  -TAVR w/u    aspirin enteric coated 81 milliGRAM(s) Oral daily  atorvastatin 80 milliGRAM(s) Oral at bedtime  DOBUTamine Infusion 5 MICROgram(s)/kG/Min (8.87 mL/Hr) IV Continuous <Continuous>  norepinephrine Infusion 0.05 MICROgram(s)/kG/Min (5.54 mL/Hr) IV Continuous <Continuous>    ===================HEMATOLOGIC/ONC ===================  Anemia  - monitor H&H/Plts   - heparin gtt for STEMI     heparin  Infusion 800 Unit(s)/Hr (8 mL/Hr) IV Continuous <Continuous>    ===================== RENAL =========================  CRISSY likely in setting of BLANCA and low flow  - SCr baseline appears ~1.4 (2019)  - Avoid nephrotoxins, renally dose meds  - Renal consult noted      ==================== GASTROINTESTINAL===================  - Tolerating mechanical soft diet   - Bowel regimen with Miralax.     aluminum hydroxide/magnesium hydroxide/simethicone Suspension 30 milliLiter(s) Oral every 6 hours PRN Dyspepsia  multivitamin 1 Tablet(s) Oral daily  polyethylene glycol 3350 17 Gram(s) Oral daily  senna 2 Tablet(s) Oral at bedtime  sodium bicarbonate 650 milliGRAM(s) Oral three times a day  sodium chloride 0.9% lock flush 10 milliLiter(s) IV Push every 1 hour PRN Pre/post blood products, medications, blood draw, and to maintain line patency    =======================    ENDOCRINE  =====================  No acute issues   - continue monitoring blood glucose closely for need to initiate sliding scale       ========================INFECTIOUS DISEASE================  Temp 99.9F->99.1F. , WBC: WNL  - Continue trending WBC and monitoring fever curve    Plan:  ====================== NEUROLOGY=====================  AOx3  - no acute issues   - Tylenol PRN for analgesia/fever   - sleep regimen with melatonin       acetaminophen   Tablet .. 650 milliGRAM(s) Oral every 6 hours PRN Temp greater or equal to 38C (100.4F), Mild Pain (1 - 3), Moderate Pain (4 - 6)  melatonin 5 milliGRAM(s) Oral at bedtime    ==================== RESPIRATORY======================  Stable on RA, SpO2 97%  - continue pulse ox monitoring, follow ABGs       ====================CARDIOVASCULAR==================  Late presentation inferolateral STEMI c/b cardiogenic shock  - LHC revealing clear cors  - presentation possibly 2/2 tako vs severe AS vs myopericarditis   - cont ASA, statin   - c/w  and levo   - continue invasive hemodynamic monitoring via TLC and Ana Laura   - monitor CVPs, VBGs and uptitrate inotrope as needed   - TTE : EF 24%, mod MR, LV dysfunction, decreased RV function   - hep gtt     Severe AS  - structural team following  -TAVR w/u    aspirin enteric coated 81 milliGRAM(s) Oral daily  atorvastatin 80 milliGRAM(s) Oral at bedtime  DOBUTamine Infusion 5 MICROgram(s)/kG/Min (8.87 mL/Hr) IV Continuous <Continuous>  norepinephrine Infusion 0.05 MICROgram(s)/kG/Min (5.54 mL/Hr) IV Continuous <Continuous>    ===================HEMATOLOGIC/ONC ===================  Anemia  - monitor H&H/Plts   - heparin gtt for STEMI     heparin  Infusion 800 Unit(s)/Hr (8 mL/Hr) IV Continuous <Continuous>    ===================== RENAL =========================  CRISSY likely in setting of BLANCA and low flow  - SCr baseline appears ~1.4 ()  - Avoid nephrotoxins, renally dose meds  - Renal consult noted      ==================== GASTROINTESTINAL===================  - Tolerating mechanical soft diet   - Bowel regimen with Miralax.     aluminum hydroxide/magnesium hydroxide/simethicone Suspension 30 milliLiter(s) Oral every 6 hours PRN Dyspepsia  multivitamin 1 Tablet(s) Oral daily  polyethylene glycol 3350 17 Gram(s) Oral daily  senna 2 Tablet(s) Oral at bedtime  sodium bicarbonate 650 milliGRAM(s) Oral three times a day  sodium chloride 0.9% lock flush 10 milliLiter(s) IV Push every 1 hour PRN Pre/post blood products, medications, blood draw, and to maintain line patency    =======================    ENDOCRINE  =====================  No acute issues   - continue monitoring blood glucose closely for need to initiate sliding scale       ========================INFECTIOUS DISEASE================  Temp 99.9F->99.1F. , WBC: WNL  - Continue trending WBC and monitoring fever curve       ****Fellow Note****    Plan  -repeat CXR w/ stable effusions  -to place swan today  -hgb downtrending, will repeat cbc today; no e/o overt bleed; c/w hep gtt for AF    TORIE Francois MD  Cardiology Fellow  Text or Call: [phone]  For all New Consults and Questions:  [email or web address]   Login: [employer]   85 y/o F w/ pmhx of HTN, AFIB (on coumadin) p/w CP and vomiting in setting of late presentation MI S/P LHC () shows clean coronaries w/ takosubo physiology w/ severe AS and mod to severe TR c/b CRISSY and shock Liver    # NEUROLOGY: AOx3  - no acute issues   - Tylenol PRN for analgesia/fever   - sleep regimen with melatonin     # RESPIRATORY  Developed acute pulm edema this morning s/p Lasix 40 IVP and currently on BIPAP 8/10/50%  CXR () bilateral pleural effusion   - continue pulse ox monitoring, follow ABGs   - Oxygen supplement as needed   - CXR today     # CARDIOVASCULAR: Late presentation inferolateral STEMI c/b cardiogenic shock s/p LHC()revealing clear cors  TTE () EF 24%, mod MR, LV dysfunction, decreased RV function   Denies CP or palpitation   - Cont ASA, statin   - Cont.  and levo   - monitor CVPs, VBGs and uptitrate inotrope as needed   - Refloat swan   Hx of afib currently rate controlled   - Cont. hep gtt   Severe AS  - structural team following: Likely BAV  - Diuresis as needed     # RENAL  CRISSY likely in setting of BLANCA and low flow: Baseline Cr 1.4   Cr 1.40<1.92<2.73  - Avoid nephrotoxins, renally dose meds  - Monitor BUN/Cr, UO and lytes  - Follow up w/ renal rec     # GASTROINTESTINAL (Last BM )  - Tolerating mechanical soft diet   - Bowel regimen with Miralax.     # INFECTIOUS DISEASE: No leukocytosis and pt remains afebrile     #MIS: Full code  Hep gtt      85 y/o F w/ pmhx of HTN, AFIB (on coumadin) p/w CP and vomiting in setting of late presentation MI S/P LHC () shows clean coronaries w/ takosubo physiology w/ severe AS and mod to severe TR c/b CRISSY and shock Liver    # NEUROLOGY: AOx3  - no acute issues   - Tylenol PRN for analgesia/fever   - sleep regimen with melatonin     # RESPIRATORY  Developed acute pulm edema this morning s/p Lasix 40 IVP and currently on BIPAP 8/10/50%  CXR () bilateral pleural effusion   - continue pulse ox monitoring, follow ABGs   - Oxygen supplement as needed   - CXR today     # CARDIOVASCULAR: Late presentation inferolateral STEMI c/b cardiogenic shock s/p LHC()revealing clear cors  TTE () EF 24%, mod MR, LV dysfunction, decreased RV function   Denies CP or palpitation   - Cont ASA, statin   - Cont.  and levo   - monitor CVPs, VBGs and uptitrate inotrope as needed   - Refloat swan   Hx of afib currently rate controlled   - Cont. hep gtt   Severe AS  - structural team following: Likely BAV  - Diuresis as needed     # RENAL  CRISSY likely in setting of BLANCA and low flow: Baseline Cr 1.4   Cr 1.40<1.92<2.73  - Avoid nephrotoxins, renally dose meds  - Monitor BUN/Cr, UO and lytes  - Follow up w/ renal rec     # GASTROINTESTINAL (Last BM )  - Tolerating mechanical soft diet   - Bowel regimen with Miralax.     # INFECTIOUS DISEASE: No leukocytosis and pt remains afebrile     #MIS: Full code  Hep gtt   To do : repeat cbc  float swan  check CVP      85 y/o F w/ pmhx of HTN, AFIB (on coumadin) p/w CP and vomiting in setting of late presentation MI S/P LHC () shows clean coronaries w/ takosubo physiology w/ severe AS and mod to severe TR c/b CRISSY and shock Liver    # NEUROLOGY: AOx3  - no acute issues   - Tylenol PRN for analgesia/fever   - sleep regimen with melatonin     # RESPIRATORY  Developed acute pulm edema this morning s/p Lasix 40 IVP and currently on BIPAP 8/10/50%  CXR () bilateral pleural effusion   - continue pulse ox monitoring, follow ABGs   - Oxygen supplement as needed   - CXR today     # CARDIOVASCULAR: Late presentation inferolateral STEMI c/b cardiogenic shock s/p LHC()revealing clear cors  TTE () EF 24%, mod MR, LV dysfunction, decreased RV function   Denies CP or palpitation   - Cont ASA, statin   - Cont.  and levo   - monitor CVPs, VBGs and uptitrate inotrope as needed   - Refloat swan   Hx of afib currently rate controlled   - Cont. hep gtt   Severe AS  - structural team following: Likely BAV  - Diuresis as needed     # RENAL  CRISSY likely in setting of BLANCA and low flow: Baseline Cr 1.4   Cr 1.40<1.92<2.73  - Avoid nephrotoxins, renally dose meds  - Monitor BUN/Cr, UO and lytes  - Follow up w/ renal rec   - Cont. Sodium bicarb     # GASTROINTESTINAL (Last BM )  - Tolerating mechanical soft diet   - Bowel regimen with Miralax.     # INFECTIOUS DISEASE: No leukocytosis and pt remains afebrile     #MIS: Full code  Hep gtt   To do : repeat cbc  float swan  check CVP

## 2021-07-17 NOTE — PROGRESS NOTE ADULT - ATTENDING COMMENTS
Patient is an 85yo female CAD, MGUS, bladder Ca, ICM on higher dose levo at .35,  at 5 with pulmonary edema this am responding to diuresis. Low EF with severe AS consider BAV. Re swan today in view of events this early AM. F/u low Hb. Cr stable back to her baseline with resolution of CRISSY. Decrease oxygen requirement over the last few hours. On heparin for Afib.

## 2021-07-18 NOTE — PROGRESS NOTE ADULT - ASSESSMENT
87 y/o F w/ pmhx of HTN, AFIB (on coumadin) p/w CP and vomiting in setting of late presentation MI S/P LHC (7/12) shows clean coronaries w/ takosubo physiology w/ severe AS and mod to severe TR c/b CRISSY and shock Liver    # NEUROLOGY: AOx3  - no acute issues   - Tylenol PRN for analgesia/fever   - sleep regimen with melatonin     # RESPIRATORY  Developed acute pulm edema this morning s/p Lasix 40 IVP and currently on BIPAP 8/10/50% now wean on NC 6L for comfort   CXR (7/16) bilateral pleural effusion   - continue pulse ox monitoring, follow ABGs   - Oxygen supplement as needed     # CARDIOVASCULAR: Late presentation inferolateral STEMI c/b cardiogenic shock s/p LHC(7/12)revealing clear cors  TTE (7/14) EF 24%, mod MR, LV dysfunction, decreased RV function   Denies CP or palpitation, Pt appears to be drier side on exam   - Cont ASA, statin   - Cont.  levo   - Restart Milrinone gtt   - monitor CVPs, VBGs and uptitrate inotrope as needed   Hx of afib currently rate controlled   - Cont. hep gtt   Severe AS  - structural team following: Likely BAV vs. TVAR  - gentle fluid w/ caution     # RENAL  CRISSY likely in setting of BLANCA and low flow: Baseline Cr 1.4   Cr 1.58<1.40<1.92<2.73  - Avoid nephrotoxins, renally dose meds  - Monitor BUN/Cr, UO and lytes  - Follow up w/ renal rec   - Cont. Sodium bicarb     # GASTROINTESTINAL (Last BM 7/17)  - Tolerating mechanical soft diet   - Bowel regimen with Miralax.     # INFECTIOUS DISEASE: No leukocytosis and pt remains afebrile     #MIS: Full code  Hep gtt

## 2021-07-18 NOTE — PROGRESS NOTE ADULT - ATTENDING COMMENTS
87yo female htn, afib, severe AS with EF 15% who did not tolerate dobutamine secondary to tachycardia and levophed was on max dose on milrinone who is now off inotrope and tapering down the levo. She had good urine output yesterday but overnight decrease urine output. Creatinine closer to baseline. CVP 5-7 and PA sat 51%. REstart milrinone if no response to 100cc fluid bolus. Concerned about CT secondary to dye load. Will discuss with structural. Continue close monitoring hemodynamics.

## 2021-07-18 NOTE — PROGRESS NOTE ADULT - SUBJECTIVE AND OBJECTIVE BOX
ANDRE DE LUNA  MRN-[id number]  Patient is a 86y old  Female who presents with a chief complaint of late presentation MI (2021 07:34)    HPI:  85 yo lady PMHx of HTN and paroxysmal Afib on coumadin who was transferred to [facility] ED from [place] ED where she presented with 2 week hx of intermittent atypical chest pain, with acute exacerbation of chest pain radiating to back for the last 2 days, also associated with single episode of syncope at home today.     In the OSH ED, vitals - HR 80-90s Afib, -130s, RR 20, and SpO2 98% on 2L NC. Unremarkable exam reported. Labs c/w CRISSY on CKD (Cr 1.9 compared to 1.4 in 2019), with trop >40, pBNP 8543, and CKMB 94.5. CT chest angio (90cc contrast) without evidence of PE but inconclusive study on aortic dissection. Was transferred to [facility] ED for further management.    At the [facility] ED, vitals - HR 90-100s Afib, /60 at the bedside, RR 20, and SpO2 95% on room air. On physical exam, with JVD ~8cm, without pertinent cardiac or respiratory exam findings, without associated LE edema. Labs c/w CRISSY, hsTrop >10,000, lactate 2.5. EKG with Afib with RBBB, with Q waves on lateral leads. Bedside TTE with reduced EF ~15-20%. Repeat CT chest angio to r/o aortic dissection was negative for significant dissection.    Admitted to CICU for close monitoring, chest pain management on nitro gtt, pending [facility] in AM      (2021 00:11)      Hospital Course:   transferred to CCU for further management     24 HOUR EVENTS:    REVIEW OF SYSTEMS:   Constitutional: + fevers, or chills  Eyes/ENT: No visual changes  Respiratory: No cough, wheezing, hemoptysis  Cardiovascular: No chest pain, no palpitations  Gastrointestinal: No abdominal pain.   Genitourinary: No dysuria  Neurological: No numbness, no weakness  Skin: No itching, rashes    ICU Vital Signs Last 24 Hrs  T(C): 37.5 (2021 16:00), Max: 37.5 (2021 16:00)  T(F): 99.5 (2021 16:00), Max: 99.5 (2021 16:00)  HR: 118 (2021 18:55) (98 - 132)  BP: 89/66 (2021 10:30) (87/50 - 114/60)  BP(mean): 74 (2021 10:30) (61 - 84)  ABP: 94/54 (2021 18:55) (80/44 - 116/74)  ABP(mean): 72 (2021 18:55) (58 - 172)  RR: 19 (2021 18:30) (6 - 45)  SpO2: 99% (2021 18:55) (79% - 100%)      CVP(mm Hg): 5 (21 @ 18:55) (-3 - 21)  CO: --  CI: --  PA: 51/17 (21 @ 18:55) (30/16 - 67/36)  PA(mean): 30 (21 @ 18:55) (20 - 48)  PA(direct): --  PCWP: --  LA: --  RA: --  SVR: --  SVRI: --  PVR: --  PVRI: --  I&O's Summary    2021 07:01  -  2021 07:00  --------------------------------------------------------  IN: 1989.5 mL / OUT: 2250 mL / NET: -260.5 mL    2021 07:01  -  2021 19:01  --------------------------------------------------------  IN: 1199.8 mL / OUT: 300 mL / NET: 899.8 mL        CAPILLARY BLOOD GLUCOSE    CAPILLARY BLOOD GLUCOSE          PHYSICAL EXAM:   General: No acute distress  Eyes: EOMI, PERRLA, conjunctiva and sclera clear  Chest/Lung: CTAB, no wheezes, rales, or rhonchi  Heart: Regular rate, regular rhythm. Normal S1/S2. No murmurs, rubs, or gallops.  Abdomen: Soft, nontender, nondistended. Normal bowel sounds.  Extremites: 2+ peripheral pulses B/L. No clubbing, cyanosis, or edema.  Neurology: A&O x3, no focal deficits  Skin: No rashes or lesions  ============================I/O===========================   I&O's Detail    2021 07:  -  2021 07:00  --------------------------------------------------------  IN:    DOBUTamine: 178 mL    Heparin: 192 mL    Milrinone: 26.4 mL    Milrinone: 4.4 mL    Norepinephrine: 868.7 mL    Oral Fluid: 720 mL  Total IN: 1989.5 mL    OUT:    Indwelling Catheter - Urethral (mL): 2250 mL  Total OUT: 2250 mL    Total NET: -260.5 mL      2021 07:01  -  2021 19:01  --------------------------------------------------------  IN:    Heparin: 88 mL    IV PiggyBack: 250 mL    Milrinone: 53.2 mL    Milrinone: 22 mL    Norepinephrine: 426.6 mL    Oral Fluid: 360 mL  Total IN: 1199.8 mL    OUT:    Indwelling Catheter - Urethral (mL): 300 mL  Total OUT: 300 mL    Total NET: 899.8 mL        ============================ LABS =========================                        8.7    8.49  )-----------( 171      ( 2021 01:17 )             26.3     07-18    134<L>  |  100  |  34<H>  ----------------------------<  137<H>  4.6   |  22  |  1.58<H>    Ca    9.7      2021 01:17  Phos  2.6     07-18  Mg     2.0     -18    TPro  5.6<L>  /  Alb  2.7<L>  /  TBili  1.0  /  DBili  x   /  AST  31  /  ALT  34  /  AlkPhos  91  -18    LIVER FUNCTIONS - ( 2021 01:17 )  Alb: 2.7 g/dL / Pro: 5.6 g/dL / ALK PHOS: 91 U/L / ALT: 34 U/L / AST: 31 U/L / GGT: x           PT/INR - ( 2021 01:17 )   PT: 12.5 sec;   INR: 1.04 ratio         PTT - ( 2021 01:17 )  PTT:85.4 sec  ABG - ( 2021 01:07 )  pH, Arterial: 7.45  pH, Blood: x     /  pCO2: 28    /  pO2: 89    / HCO3: 20    / Base Excess: -3.4  /  SaO2: 98                ======================Micro/Rad/Cardio=================  Telemetry: Reviewed   EKG: Reviewed  CXR: Reviewed  Culture: Reviewed   Echo: Reviewed  Cath: Reviewed  ======================================================  PAST MEDICAL & SURGICAL HISTORY:  Afib    Hypertension    Hyperparathyroidism    Osteoporosis    Dyslipidemia    Macular degeneration    Temporal arteritis    Spinal stenosis    Osteoarthritis    Sternal fracture      History of cholecystectomy    H/O nasal septoplasty    History of lumbar laminectomy  with microdiskectomy    H/O parathyroidectomy    H/O total knee replacement, left    History of temporal arteritis  b/l surgical repair    H/O bilateral cataract extraction      ====================ASSESSMENT ==============  Late presentation inferolateral STEMI  CRISSY likely in setting of BLANCA  Anemia     Plan:  ====================== NEUROLOGY=====================  A&Ox3, nonfocal   - Continue to monitor neuro status per protocol  - no acute issues   - Tylenol PRN for analgesia/fever   - sleep regimen with melatonin   - Started on Quetiapine     acetaminophen   Tablet .. 650 milliGRAM(s) Oral every 6 hours PRN Temp greater or equal to 38C (100.4F), Mild Pain (1 - 3), Moderate Pain (4 - 6)  melatonin 5 milliGRAM(s) Oral at bedtime  QUEtiapine 12.5 milliGRAM(s) Oral at bedtime    ==================== RESPIRATORY======================  Stable on RA, SpO2 97%  - continue pulse ox monitoring, follow ABGs       ====================CARDIOVASCULAR==================  Late presentation inferolateral STEMI c/b cardiogenic shock  - LHC revealing clear cors  - presentation possibly 2/2 tako vs severe AS vs myopericarditis   - cont ASA, statin   - c/w  and levo   - Continue with Primacor gtt for inotropic support.   - continue invasive hemodynamic monitoring via TLC and [place]   - monitor CVPs, VBGs and uptitrate inotrope as needed   - TTE : EF 24%, mod MR, LV dysfunction, decreased RV function   - hep gtt     Severe AS  - structural team following  -TAVR w/u    aspirin enteric coated 81 milliGRAM(s) Oral daily  atorvastatin 80 milliGRAM(s) Oral at bedtime  milrinone Infusion 0.375 MICROgram(s)/kG/Min (6.65 mL/Hr) IV Continuous <Continuous>  norepinephrine Infusion 0.05 MICROgram(s)/kG/Min (5.54 mL/Hr) IV Continuous <Continuous>    ===================HEMATOLOGIC/ONC ===================  Anemia  - monitor H&H/Plts   - heparin gtt for STEMI     heparin  Infusion 800 Unit(s)/Hr (8 mL/Hr) IV Continuous <Continuous>    ===================== RENAL =========================  CRISSY likely in setting of BLANCA and low flow  - SCr baseline appears ~1.4 (2019)  - Avoid nephrotoxins, renally dose meds  - Renal consult noted      ==================== GASTROINTESTINAL===================  - Tolerating mechanical soft diet   - Bowel regimen with Miralax.     aluminum hydroxide/magnesium hydroxide/simethicone Suspension 30 milliLiter(s) Oral every 6 hours PRN Dyspepsia  multivitamin 1 Tablet(s) Oral daily  polyethylene glycol 3350 17 Gram(s) Oral daily  senna 2 Tablet(s) Oral at bedtime  sodium bicarbonate 650 milliGRAM(s) Oral three times a day  sodium chloride 0.9% lock flush 10 milliLiter(s) IV Push every 1 hour PRN Pre/post blood products, medications, blood draw, and to maintain line patency    =======================    ENDOCRINE  =====================  No acute issues   - continue monitoring blood glucose closely for need to initiate sliding scale       ========================INFECTIOUS DISEASE================  Temp 99.9F->99.1F->99.5F. , WBC: WNL  - Continue trending WBC and monitoring fever curve       Patient requires continuous monitoring with bedside rhythm monitoring, pulse ox monitoring, and intermittent blood gas analysis. Care plan discussed with ICU care team. Patient remained critical and at risk for life threatening decompensation.  Patient seen, examined and plan discussed with CCU team during rounds.     I have personally provided 35 minutes of critical care time excluding time spent on separate procedures.    By signing my name below, I, Marion John, attest that this documentation has been prepared under the direction and in the presence of Jagdish Clement NP  Electronically signed: Maritza Michelle, 21 @ 19:01    I, Jagdish Clement NP, personally performed the services described in this documentation. all medical record entries made by the conibe were at my direction and in my presence. I have reviewed the chart and agree that the record reflects my personal performance and is accurate and complete  Electronically signed: Jagdish Clement NP

## 2021-07-18 NOTE — PROGRESS NOTE ADULT - SUBJECTIVE AND OBJECTIVE BOX
CICU DAY NOTE  Admission date: 21  Chief complaint/ Diagnosis: Late presentation MI and severe AS   HPI: 85 yo lady PMHx of HTN and paroxysmal Afib on coumadin who was transferred to [facility] ED from [place] ED where she presented with 2 week hx of intermittent atypical chest pain, with acute exacerbation of chest pain radiating to back for the last 2 days, also associated with single episode of syncope at home today.   In the [facility] ED, vitals - HR 80-90s Afib, -130s, RR 20, and SpO2 98% on 2L NC. Unremarkable exam reported. Labs c/w CRISSY on CKD (Cr 1.9 compared to 1.4 in 2019), with trop >40, pBNP 8543, and CKMB 94.5. CT chest angio (90cc contrast) without evidence of PE but inconclusive study on aortic dissection. Was transferred to [facility] ED for further management.  At the [facility] ED, vitals - HR 90-100s Afib, /60 at the bedside, RR 20, and SpO2 95% on room air. On physical exam, with JVD ~8cm, without pertinent cardiac or respiratory exam findings, without associated LE edema. Labs c/w CRISSY, hsTrop >10,000, lactate 2.5. EKG with Afib with RBBB, with Q waves on lateral leads. Bedside TTE with reduced EF ~15-20%. Repeat CT chest angio to r/o aortic dissection was negative for significant dissection.    Last 24hr Interval history: Dropped mixed venous sat 42-48 on @5 changed to milrinone 0.5 w/ minimum effect.  Received the patient off  and Milrinone gtt  currently on Levo @0.3 and hep gtt   v/s 85/48/67 PA    Latest mixed 51 CVP 5-7 CO/CI 5.09/3.05    REVIEW OF SYSTEMS  Complains of more tiredness and fatigue   Denies CP, Palpitation, SOB, Dyspnea[X  ] All other systems negative    MEDICATIONS  (STANDING)  aspirin enteric coated 81 milliGRAM(s) Oral daily  atorvastatin 80 milliGRAM(s) Oral at bedtime  chlorhexidine 2% Cloths 1 Application(s) Topical daily  heparin  Infusion 800 Unit(s)/Hr (8 mL/Hr) IV Continuous <Continuous>  lidocaine   Patch 1 Patch Transdermal daily  melatonin 5 milliGRAM(s) Oral at bedtime  milrinone Infusion 0.25 MICROgram(s)/kG/Min (4.43 mL/Hr) IV Continuous <Continuous>  multivitamin 1 Tablet(s) Oral daily  norepinephrine Infusion 0.05 MICROgram(s)/kG/Min (5.54 mL/Hr) IV Continuous <Continuous>  polyethylene glycol 3350 17 Gram(s) Oral daily  QUEtiapine 12.5 milliGRAM(s) Oral at bedtime  senna 2 Tablet(s) Oral at bedtime  sodium bicarbonate 650 milliGRAM(s) Oral three times a day    MEDICATIONS  (PRN):  acetaminophen   Tablet .. 650 milliGRAM(s) Oral every 6 hours PRN Temp greater or equal to 38C (100.4F), Mild Pain (1 - 3), Moderate Pain (4 - 6)  aluminum hydroxide/magnesium hydroxide/simethicone Suspension 30 milliLiter(s) Oral every 6 hours PRN Dyspepsia  sodium chloride 0.9% lock flush 10 milliLiter(s) IV Push every 1 hour PRN Pre/post blood products, medications, blood draw, and to maintain line patency    PAST MEDICAL & SURGICAL HISTORY:  Afib  Hypertension  Hyperparathyroidism  Osteoporosis  Dyslipidemia  Macular degeneration  Temporal arteritis  Spinal stenosis  Osteoarthritis  Sternal fracture    FAMILY HISTORY: Family history of brain cancer (Child)    Allergy   adhesives (Rash)  No Known Drug Allergies    ICU Vital Signs Last 24 Hrs  T(C): 36.3 (Max: 37.7)  HR: 126 (94 - 132)  BP: 109/57 (2021 08:45) (104/69 - 110/72)  BP(mean): 67 (2021 08:45) (67 - 80)  ABP: 98/58 (2021 08:45) (78/42 - 112/68)  ABP(mean): 76 (2021 08:45) (56 - 172)  RR: 25 (2021 07:30) (19 - 57)  SpO2: 85% (2021 08:45) (83% - 100%)      I&O's Summary    2021 07:01  -  2021 07:00  --------------------------------------------------------  IN: 1989.5 mL / OUT: 2250 mL / NET: -260.5 mL    2021 07:01  -  2021 08:57  --------------------------------------------------------  IN: 348.9 mL / OUT: 45 mL / NET: 303.9 mL      Daily     Daily Weight in k.6 (2021 06:00)    PHYSICAL EXAM  Appearance: Normal, NAD  HEAD:  Atraumatic, Normocephalic  EYES: EOMI, PERRLA, conjunctiva and sclera clear  NECK: Supple, No JVD  CHEST/LUNG: Clear to auscultation bilaterally; No wheezing  HEART: Normal S1, S2. No murmurs, rubs, or gallops  ABDOMEN: + Bowel sounds, Soft, NT, ND   EXTREMITIES:  2+ Peripheral Pulses, No clubbing, cyanosis, or edema  NEUROLOGY: non-focal, aaox3  Lymphatic: No lymphadenopathy  SKIN: No rashes or lesions    Interpretation of Telemetry:                          8.7    8.49  )-----------( 171      ( 2021 01:17 )             26.3       07-18    134<L>  |  100  |  34<H>  ----------------------------<  137<H>  4.6   |  22  |  1.58<H>    Ca    9.7      2021 01:17  Phos  2.6     07-18  Mg     2.0     18    TPro  5.6<L>  /  Alb  2.7<L>  /  TBili  1.0  /  DBili  x   /  AST  31  /  ALT  34  /  AlkPhos  91  07-18            ABG - ( 2021 01:07 )  pH, Arterial: 7.45  pH, Blood: x     /  pCO2: 28    /  pO2: 89    / HCO3: 20    / Base Excess: -3.4  /  SaO2: 98                  CAPILLARY BLOOD GLUCOSE       CICU DAY NOTE  Admission date: 21  Chief complaint/ Diagnosis: Late presentation MI and severe AS   HPI: 87 yo lady PMHx of HTN and paroxysmal Afib on coumadin who was transferred to [facility] ED from [place] ED where she presented with 2 week hx of intermittent atypical chest pain, with acute exacerbation of chest pain radiating to back for the last 2 days, also associated with single episode of syncope at home today.   In the [facility] ED, vitals - HR 80-90s Afib, -130s, RR 20, and SpO2 98% on 2L NC. Unremarkable exam reported. Labs c/w CRISSY on CKD (Cr 1.9 compared to 1.4 in 2019), with trop >40, pBNP 8543, and CKMB 94.5. CT chest angio (90cc contrast) without evidence of PE but inconclusive study on aortic dissection. Was transferred to [facility] ED for further management.  At the [facility] ED, vitals - HR 90-100s Afib, /60 at the bedside, RR 20, and SpO2 95% on room air. On physical exam, with JVD ~8cm, without pertinent cardiac or respiratory exam findings, without associated LE edema. Labs c/w CRISSY, hsTrop >10,000, lactate 2.5. EKG with Afib with RBBB, with Q waves on lateral leads. Bedside TTE with reduced EF ~15-20%. Repeat CT chest angio to r/o aortic dissection was negative for significant dissection.    Last 24hr Interval history: Dropped mixed venous sat 42-48 on @5 changed to milrinone 0.5 w/ minimum effect.  Received the patient off  and Milrinone gtt  currently on Levo @0.3 and hep gtt   v/s 85/48/67 PA    Latest mixed 51 CVP 5-7 CO/CI 5.09/3.05    REVIEW OF SYSTEMS  Complains of more tiredness and fatigue   Denies CP, Palpitation, SOB, Dyspnea[X  ] All other systems negative    MEDICATIONS  (STANDING)  aspirin enteric coated 81 milliGRAM(s) Oral daily  atorvastatin 80 milliGRAM(s) Oral at bedtime  chlorhexidine 2% Cloths 1 Application(s) Topical daily  heparin  Infusion 800 Unit(s)/Hr (8 mL/Hr) IV Continuous <Continuous>  lidocaine   Patch 1 Patch Transdermal daily  melatonin 5 milliGRAM(s) Oral at bedtime  milrinone Infusion 0.25 MICROgram(s)/kG/Min (4.43 mL/Hr) IV Continuous <Continuous>  multivitamin 1 Tablet(s) Oral daily  norepinephrine Infusion 0.05 MICROgram(s)/kG/Min (5.54 mL/Hr) IV Continuous <Continuous>  polyethylene glycol 3350 17 Gram(s) Oral daily  QUEtiapine 12.5 milliGRAM(s) Oral at bedtime  senna 2 Tablet(s) Oral at bedtime  sodium bicarbonate 650 milliGRAM(s) Oral three times a day    MEDICATIONS  (PRN):  acetaminophen   Tablet .. 650 milliGRAM(s) Oral every 6 hours PRN Temp greater or equal to 38C (100.4F), Mild Pain (1 - 3), Moderate Pain (4 - 6)  aluminum hydroxide/magnesium hydroxide/simethicone Suspension 30 milliLiter(s) Oral every 6 hours PRN Dyspepsia  sodium chloride 0.9% lock flush 10 milliLiter(s) IV Push every 1 hour PRN Pre/post blood products, medications, blood draw, and to maintain line patency    PAST MEDICAL & SURGICAL HISTORY:  Afib  Hypertension  Hyperparathyroidism  Osteoporosis  Dyslipidemia  Macular degeneration  Temporal arteritis  Spinal stenosis  Osteoarthritis  Sternal fracture    FAMILY HISTORY: Family history of brain cancer (Child)    Allergy   adhesives (Rash)  No Known Drug Allergies    ICU Vital Signs Last 24 Hrs  T(C): 36.3 (Max: 37.7)  HR: 126 (94 - 132)  BP: 109/57(104/69 - 110/72)  RR: 25  (19 - 57)  SpO2: 85%  (83% - 100%)    I&O's Summary  IN: 1989.5 mL / OUT: 2250 mL(UO 30-50cc/hr) / NET: -260.5 mL    PHYSICAL EXAM  Appearance: Normal, NAD  HEAD:  Atraumatic, Normocephalic  EYES: EOMI, PERRLA, conjunctiva and sclera clear  NECK: Supple, No JVD  CHEST/LUNG: Clear to auscultation bilaterally; No wheezing  HEART: Normal S1, S2. No murmurs, rubs, or gallops  ABDOMEN: + Bowel sounds, Soft, NT, ND   EXTREMITIES:  2+ Peripheral Pulses, No clubbing, cyanosis, or edema  NEUROLOGY: non-focal, aaox3  Lymphatic: No lymphadenopathy  SKIN: No rashes or lesions    Interpretation of Telemetry:                     8.7 <8.8<8.5   8.49  )-----------( 171                  26.3     134<L>  |  100  |  34<H>  ----------------------------<  137<H>  4.6   |  22  |  1.58<H>    Ca    9.7      Phos  2.6     Mg     2.0      TPro  5.6<L>  /  Alb  2.7<L>  /  TBili  1.0  /  DBili  x   /  AST  31  /  ALT  34  /  AlkPhos  91  07-18    ABG - ( 2021 01:07 )  pH, Arterial: 7.45/  pCO2: 28    /  pO2: 89    / HCO3: 20    / Base Excess: -3.4  /  SaO2: 98

## 2021-07-18 NOTE — PROGRESS NOTE ADULT - SUBJECTIVE AND OBJECTIVE BOX
[facility]   [phone]  RENAL FOLLOW UP NOTE  --------------------------------------------------------------------------------  Chief Complaint:    24 hour events/subjective:    seen earlier   resting comfortably     PAST HISTORY  --------------------------------------------------------------------------------  No significant changes to PMH, PSH, FHx, SHx, unless otherwise noted    ALLERGIES & MEDICATIONS  --------------------------------------------------------------------------------  Allergies    adhesives (Rash)  No Known Drug Allergies    Intolerances      Standing Inpatient Medications  aspirin enteric coated 81 milliGRAM(s) Oral daily  atorvastatin 80 milliGRAM(s) Oral at bedtime  chlorhexidine 2% Cloths 1 Application(s) Topical daily  heparin  Infusion 800 Unit(s)/Hr IV Continuous <Continuous>  lidocaine   Patch 1 Patch Transdermal daily  melatonin 5 milliGRAM(s) Oral at bedtime  milrinone Infusion 0.375 MICROgram(s)/kG/Min IV Continuous <Continuous>  multivitamin 1 Tablet(s) Oral daily  norepinephrine Infusion 0.2 MICROgram(s)/kG/Min IV Continuous <Continuous>  polyethylene glycol 3350 17 Gram(s) Oral daily  QUEtiapine 25 milliGRAM(s) Oral at bedtime  senna 2 Tablet(s) Oral at bedtime  sodium bicarbonate 650 milliGRAM(s) Oral three times a day    PRN Inpatient Medications  acetaminophen   Tablet .. 650 milliGRAM(s) Oral every 6 hours PRN  aluminum hydroxide/magnesium hydroxide/simethicone Suspension 30 milliLiter(s) Oral every 6 hours PRN  sodium chloride 0.9% lock flush 10 milliLiter(s) IV Push every 1 hour PRN      REVIEW OF SYSTEMS  --------------------------------------------------------------------------------        VITALS/PHYSICAL EXAM  --------------------------------------------------------------------------------  T(C): 37.8 (07-18-21 @ 19:00), Max: 37.8 (07-18-21 @ 19:00)  HR: 114 (07-18-21 @ 22:00) (98 - 132)  BP: 89/66 (07-18-21 @ 10:30) (87/50 - 114/60)  RR: 27 (07-18-21 @ 22:00) (6 - 51)  SpO2: 100% (07-18-21 @ 22:00) (79% - 100%)  Wt(kg): --        07-17-21 @ 07:01  -  07-18-21 @ 07:00  --------------------------------------------------------  IN: 1989.5 mL / OUT: 2250 mL / NET: -260.5 mL    07-18-21 @ 07:01  -  07-18-21 @ 22:05  --------------------------------------------------------  IN: 1320.7 mL / OUT: 360 mL / NET: 960.7 mL      Physical Exam:  	    Gen: pale appearing resting comfortably   	 - JVD  	Pulm: decrease bs  no rales or ronchi or wheezing  	CV: RRR, S1S2; no rub III/VI M   	Abd: +BS, soft, nontender/nondistended  	: No suprapubic tenderness  	UE: Warm, no cyanosis  no clubbing,  no edema  	LE: Warm, no cyanosis  no clubbing, no edema  	Neuro: alert and oriented. speech coherent       LABS/STUDIES  --------------------------------------------------------------------------------              8.7    8.49  >-----------<  171      [07-18-21 @ 01:17]              26.3     134  |  100  |  34  ----------------------------<  137      [07-18-21 @ 01:17]  4.6   |  22  |  1.58        Ca     9.7     [07-18-21 @ 01:17]      Mg     2.0     [07-18-21 @ 01:17]      Phos  2.6     [07-18-21 @ 01:17]    TPro  5.6  /  Alb  2.7  /  TBili  1.0  /  DBili  x   /  AST  31  /  ALT  34  /  AlkPhos  91  [07-18-21 @ 01:17]    PT/INR: PT 12.5 , INR 1.04       [07-18-21 @ 01:17]  PTT: 85.4       [07-18-21 @ 01:17]      Creatinine Trend:  SCr 1.58 [07-18 @ 01:17]  SCr 1.61 [07-17 @ 16:17]  SCr 1.40 [07-17 @ 03:32]  SCr 1.92 [07-16 @ 03:32]  SCr 2.73 [07-15 @ 01:38]                  PTH -- (Ca 9.2)      [07-13-21 @ 23:49]   193  Vitamin D (25OH) 47.6      [07-13-21 @ 23:49]  TSH 2.32      [07-12-21 @ 08:24]  Lipid: chol 105, TG 89, HDL 51, LDL --      [07-12-21 @ 08:26]

## 2021-07-18 NOTE — PROGRESS NOTE ADULT - ASSESSMENT
85 yo lady PMHx of HTN and paroxysmal Afib on coumadin who was transferred to [facility] ED from [place] ED where she presented with 2 week hx of intermittent chest pain, with acute exacerbation of chest pain radiating to back  associated with single episode of syncope at home on day of admission. 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      1- CKD III/IV with CRISSY   2- a fib  3- chest pains  on admission   4- CHF  5- aortic stenosis   6- hypercalcemia       crissy in setting of contrast nephropathy cr is improving slowly   pt also with severe AS   dobutamine support  undergoing TAVR eval. awaiting improvement in renal function   strict I/O  suspect shpt due to underlying ckd but pt also with hypercalcemia as well   d.w CCU team when seen earlier

## 2021-07-19 NOTE — PROGRESS NOTE ADULT - ATTENDING COMMENTS
Admitted with cardiogenic shock and severe aortic stenosis  A+Ox3  Cardiogenic shock requiring Milrinone; also requiring Levophed infusion   SvO2 57%, add Vasopressin and double-concentrate Levophed  Structural Heart following for aortic stenosis - for BAV tomorrow  TTE with severely reduced LVEF  O2 sats mid 90s on nasal cannula; intermittently requiring Bipap as fluid status is tenuous and she becomes overloaded easily  NPO  Non-oliguric CRISSY with labile volume status - target 500 cc to 1L negative  H/H decreasing but acceptable on Heparin drip for afib  Afebrile, no antibiotics  Sugars controlled  ALEXA Holm/Arnav 7/14 and phil 7/13 Admitted with cardiogenic shock and severe aortic stenosis  A+Ox3  Cardiogenic shock requiring Milrinone; also requiring Levophed infusion   SvO2 57%, add Vasopressin and double-concentrate Levophed, keep inotrope at current dose  Structural Heart following for aortic stenosis - for BAV tomorrow  TTE with severely reduced LVEF  O2 sats mid 90s on nasal cannula; intermittently requiring Bipap as fluid status is tenuous and she becomes overloaded easily  NPO  Non-oliguric CRISSY with labile volume status - target 500 cc to 1L negative  H/H decreasing but acceptable on Heparin drip for afib  Afebrile, no antibiotics  Sugars controlled  ALEXA Holm/Arnav 7/14 and phil 7/13

## 2021-07-19 NOTE — PROGRESS NOTE ADULT - SUBJECTIVE AND OBJECTIVE BOX
*****Structural Heart Team*****    Subjective:      T(C): 37 (07-19-21 @ 12:00), Max: 37.8 (07-18-21 @ 19:00)  HR: 110 (07-19-21 @ 18:20) (104 - 132)  BP: 105/65 (07-19-21 @ 08:15) (105/65 - 118/66)  RR: 29 (07-19-21 @ 18:20) (15 - 51)  SpO2: 100% (07-19-21 @ 18:20) (90% - 100%)  Wt(kg): --  07-18 @ 07:01  -  07-19 @ 07:00  --------------------------------------------------------  IN: 1762.6 mL / OUT: 1335 mL / NET: 427.6 mL    07-19 @ 07:01  -  07-19 @ 18:28  --------------------------------------------------------  IN: 849.7 mL / OUT: 1100 mL / NET: -250.3 mL        MEDICATIONS  (STANDING):  aspirin enteric coated 81 milliGRAM(s) Oral daily  atorvastatin 80 milliGRAM(s) Oral at bedtime  chlorhexidine 2% Cloths 1 Application(s) Topical daily  heparin  Infusion 800 Unit(s)/Hr (8 mL/Hr) IV Continuous <Continuous>  lidocaine   Patch 1 Patch Transdermal daily  melatonin 5 milliGRAM(s) Oral at bedtime  milrinone Infusion 0.375 MICROgram(s)/kG/Min (6.65 mL/Hr) IV Continuous <Continuous>  morphine  - Injectable 1 milliGRAM(s) IV Push once  multivitamin 1 Tablet(s) Oral daily  norepinephrine Infusion 0.2 MICROgram(s)/kG/Min (11.1 mL/Hr) IV Continuous <Continuous>  polyethylene glycol 3350 17 Gram(s) Oral daily  QUEtiapine 25 milliGRAM(s) Oral at bedtime  senna 2 Tablet(s) Oral at bedtime  sodium bicarbonate 650 milliGRAM(s) Oral three times a day  vasopressin Infusion 0.04 Unit(s)/Min (2.4 mL/Hr) IV Continuous <Continuous>    MEDICATIONS  (PRN):  acetaminophen   Tablet .. 650 milliGRAM(s) Oral every 6 hours PRN Temp greater or equal to 38C (100.4F), Mild Pain (1 - 3), Moderate Pain (4 - 6)  aluminum hydroxide/magnesium hydroxide/simethicone Suspension 30 milliLiter(s) Oral every 6 hours PRN Dyspepsia  sodium chloride 0.9% lock flush 10 milliLiter(s) IV Push every 1 hour PRN Pre/post blood products, medications, blood draw, and to maintain line patency      Home Medications:  Coumadin 5 mg oral tablet: 1 tab(s) orally once a day (11 Jul 2021 17:16)  lisinopril 20 mg oral tablet: 1 tab(s) orally once a day (11 Jul 2021 17:16)  meclizine 12.5 mg oral tablet: 1 tab(s) orally once a day (11 Jul 2021 17:16)  metoprolol succinate 25 mg oral tablet, extended release: 1 tab(s) orally once a day (11 Jul 2021 17:16)  simvastatin 40 mg oral tablet: 1 tab(s) orally once a day (at bedtime) (11 Jul 2021 17:16)                              8.0    9.69  )-----------( 179      ( 19 Jul 2021 03:11 )             24.5   07-19    132<L>  |  101  |  36<H>  ----------------------------<  156<H>  4.6   |  21<L>  |  1.51<H>    Ca    9.6      19 Jul 2021 03:11  Phos  2.4     07-19  Mg     1.8     07-19    TPro  5.6<L>  /  Alb  2.9<L>  /  TBili  0.9  /  DBili  x   /  AST  28  /  ALT  26  /  AlkPhos  95  07-19  PT/INR - ( 18 Jul 2021 01:17 )   PT: 12.5 sec;   INR: 1.04 ratio         PTT - ( 19 Jul 2021 03:11 )  PTT:77.1 sec

## 2021-07-19 NOTE — PROGRESS NOTE ADULT - SUBJECTIVE AND OBJECTIVE BOX
Subjective:  - became SOB this morning when she was lying flat, went into flash pulmonary edema and briefly on BIPAP    Medications:  acetaminophen   Tablet .. 650 milliGRAM(s) Oral every 6 hours PRN  aluminum hydroxide/magnesium hydroxide/simethicone Suspension 30 milliLiter(s) Oral every 6 hours PRN  aspirin enteric coated 81 milliGRAM(s) Oral daily  atorvastatin 80 milliGRAM(s) Oral at bedtime  chlorhexidine 2% Cloths 1 Application(s) Topical daily  heparin  Infusion 800 Unit(s)/Hr IV Continuous <Continuous>  lidocaine   Patch 1 Patch Transdermal daily  melatonin 5 milliGRAM(s) Oral at bedtime  milrinone Infusion 0.375 MICROgram(s)/kG/Min IV Continuous <Continuous>  multivitamin 1 Tablet(s) Oral daily  norepinephrine Infusion 0.2 MICROgram(s)/kG/Min IV Continuous <Continuous>  polyethylene glycol 3350 17 Gram(s) Oral daily  QUEtiapine 25 milliGRAM(s) Oral at bedtime  senna 2 Tablet(s) Oral at bedtime  sodium bicarbonate 650 milliGRAM(s) Oral three times a day  sodium chloride 0.9% lock flush 10 milliLiter(s) IV Push every 1 hour PRN  vasopressin Infusion 0.04 Unit(s)/Min IV Continuous <Continuous>      ICU Vital Signs Last 24 Hrs  T(C): 37, Max: 37.8 ( @ 19:00)  HR: 120 (100 - 132)  BP: 105/65 (105/65 - 118/66)  BP(mean): 77 (77 - 79)  ABP: 110/68 (78/42 - 120/74)  ABP(mean): 86 (56 - 94)  RR: 23 (15 - 51)  SpO2: 96% (90% - 100%)    Weight in k.9 (21)  Weight in k.6 (21)      I&O's Summary Last 24 Hrs    IN: 1762.6 mL / OUT: 1335 mL / NET: 427.6 mL      Tele: Afib 100-120s, up to 150s    Physical Exam:    General: NAD.  Neck: JVP mildly elevated.  Respiratory: Clear to auscultation bilaterally  CV: Irregularly irregular, tachycardic. Normal S1 and S2. II/VI SM at RUSB. Radial pulses normal.   Abdomen: Soft, non-distended, non-tender  Skin: No skin lesions  Extremities: Warm, no edema  Neurology: Non-focal, alert and oriented times three.   Psych: Affect normal    Labs:    ( 21 @ 03:11 )               8.0    9.69  )--------( 179                  24.5     ( 21 @ 03:11 )     132  |  101  |  36  ---------------------<  156  4.6  |  21  |  1.51    Ca 9.6  Phos 2.4  Mg 1.8    ( 21 @ 03:11 )  TPro  5.6  /  Alb  2.9  /  TBili  0.9  /  DBili  x   /  AST  28  /  ALT  26  /  AlkPhos  95  ( 21 @ 01:17 )  TPro  5.6  /  Alb  2.7  /  TBili  1.0  /  DBili  x   /  AST  31  /  ALT  34  /  AlkPhos  91    PTT/PT/INR - ( 21 @ 03:11 )  PTT: 77.1 sec / PT: x     / INR: x        ( 21 @ 01:22 )  TropHS >65350 /    / CKMB x      ( 21 @ 18:04 )  TropHS >77878 /    / CKMB x        Serum Pro-Brain Natriuretic Peptide: 79352 (21 @ 21:34)    Oxygen Saturation, Mixed: 57 (21 @ 02:56)  Oxygen Saturation, Mixed: 62 (21 @ 14:11)

## 2021-07-19 NOTE — PROGRESS NOTE ADULT - PROBLEM SELECTOR PLAN 1
- can try slowly weaning  to 4 mcg/kg/min  - trend hemodynamics and markers of end organ function (LFTs, lactate, Mv02)  - CVP 8 this morning, would keep net even to slightly net negative  - wean levophed for target SBP >/= 90  - would check ScvO2 and simultaneous MvO2 to see if they correlate and then d/c swan but leave intro to monitor CVP and central venous saturations - continue milrinone at 0.375 mcg/kg/min  - trend hemodynamics and markers of end organ function (LFTs, lactate, Mv02)  - CVP 6 this morning, c/w PRN diuretics to keep net even to slightly net negative  - wean levophed for target SBP >/= 90

## 2021-07-19 NOTE — PROGRESS NOTE ADULT - ASSESSMENT
85 y/o F w/ pmhx of HTN, AFIB (on coumadin) p/w CP and vomiting in setting of late presentation MI S/P LHC (7/12) shows clean coronaries w/ takosubo physiology w/ severe AS and mod to severe TR c/b CRISSY and shock Liver    # NEUROLOGY: AOx3  - no acute issues   - Tylenol PRN for analgesia/fever   - sleep regimen with melatonin     # RESPIRATORY  Developed acute pulm edema this morning s/p Lasix 40 IVP and currently on BIPAP 8/10/50% now wean on NC 6L for comfort   CXR (7/16) bilateral pleural effusion   - continue pulse ox monitoring, follow ABGs   - Oxygen supplement as needed     # CARDIOVASCULAR: Late presentation inferolateral STEMI c/b cardiogenic shock s/p LHC(7/12)revealing clear cors  TTE (7/14) EF 24%, mod MR, LV dysfunction, decreased RV function   Denies CP or palpitation, Pt appears to be drier side on exam   - Cont ASA, statin   - Cont.  levo   - Restart Milrinone gtt   - monitor CVPs, VBGs and uptitrate inotrope as needed   Hx of afib currently rate controlled   - Cont. hep gtt   Severe AS  - structural team following: Likely BAV vs. TVAR  - gentle fluid w/ caution     # RENAL  CRISSY likely in setting of BLANCA and low flow: Baseline Cr 1.4   Cr 1.58<1.40<1.92<2.73  - Avoid nephrotoxins, renally dose meds  - Monitor BUN/Cr, UO and lytes  - Follow up w/ renal rec   - Cont. Sodium bicarb     # GASTROINTESTINAL (Last BM 7/17)  - Tolerating mechanical soft diet   - Bowel regimen with Miralax.     # INFECTIOUS DISEASE: No leukocytosis and pt remains afebrile     #MIS: Full code  Hep gtt      87 y/o F w/ pmhx of HTN, AFIB (on coumadin) p/w CP and vomiting in setting of late presentation MI S/P LHC (7/12) shows clean coronaries w/ takosubo physiology w/ severe AS and mod to severe TR c/b CRISSY and shock Liver    # NEUROLOGY: AOx3  - no acute issues   - Tylenol PRN for analgesia/fever   - sleep regimen with melatonin     # RESPIRATORY  Developed acute pulm edema this morning s/p Lasix 40 IVP and currently on BIPAP 8/10/50% now wean on NC 6L for comfort   CXR (7/17) bilateral pleural effusion   - continue pulse ox monitoring, follow ABGs   - Oxygen supplement as needed     # CARDIOVASCULAR: Late presentation inferolateral STEMI c/b cardiogenic shock s/p LHC(7/12)revealing clear cors  TTE (7/14) EF 24%, mod MR, LV dysfunction, decreased RV function   Denies CP or palpitation, Pt appears to be drier side on exam   - Cont ASA, statin   - Cont.  levo   - Cont. Milrinone gtt   - monitor CVPs, VBGs and uptitrate inotrope as needed   Hx of afib currently rate controlled   - Cont. hep gtt   Severe AS  - structural team following: Likely BAV vs. TVAR Tuesday     # RENAL  CRISSY likely in setting of BLANCA and low flow: Baseline Cr 1.4   Cr 1.58<1.40<1.92<2.73  - Avoid nephrotoxins, renally dose meds  - Monitor BUN/Cr, UO and lytes  - Follow up w/ renal rec   - Cont. Sodium bicarb     # GASTROINTESTINAL (Last BM 7/17)  - Tolerating mechanical soft diet   - Bowel regimen with Miralax.     # INFECTIOUS DISEASE: No leukocytosis and pt remains afebrile     #MIS: Full code  Hep gtt

## 2021-07-19 NOTE — PROGRESS NOTE ADULT - SUBJECTIVE AND OBJECTIVE BOX
[facility]   [phone]  RENAL FOLLOW UP NOTE  --------------------------------------------------------------------------------  Chief Complaint:    24 hour events/subjective:    seen earlier c/o left rib pain     PAST HISTORY  --------------------------------------------------------------------------------  No significant changes to PMH, PSH, FHx, SHx, unless otherwise noted    ALLERGIES & MEDICATIONS  --------------------------------------------------------------------------------  Allergies    adhesives (Rash)  No Known Drug Allergies    Intolerances      Standing Inpatient Medications  aspirin enteric coated 81 milliGRAM(s) Oral daily  atorvastatin 80 milliGRAM(s) Oral at bedtime  chlorhexidine 2% Cloths 1 Application(s) Topical daily  heparin  Infusion 800 Unit(s)/Hr IV Continuous <Continuous>  lidocaine   Patch 1 Patch Transdermal daily  melatonin 5 milliGRAM(s) Oral at bedtime  milrinone Infusion 0.375 MICROgram(s)/kG/Min IV Continuous <Continuous>  multivitamin 1 Tablet(s) Oral daily  norepinephrine Infusion 0.2 MICROgram(s)/kG/Min IV Continuous <Continuous>  polyethylene glycol 3350 17 Gram(s) Oral daily  QUEtiapine 25 milliGRAM(s) Oral at bedtime  senna 2 Tablet(s) Oral at bedtime  sodium bicarbonate 650 milliGRAM(s) Oral three times a day  vasopressin Infusion 0.04 Unit(s)/Min IV Continuous <Continuous>    PRN Inpatient Medications  acetaminophen   Tablet .. 650 milliGRAM(s) Oral every 6 hours PRN  aluminum hydroxide/magnesium hydroxide/simethicone Suspension 30 milliLiter(s) Oral every 6 hours PRN  sodium chloride 0.9% lock flush 10 milliLiter(s) IV Push every 1 hour PRN      REVIEW OF SYSTEMS  --------------------------------------------------------------------------------    Gen: denies chills,  CVS: denies chest pain/palpitations  Resp:  + SOB/Cough  GI: Denies N/V/Abd pain  : Denies dysuria    All other systems were reviewed and are negative, except as noted.    VITALS/PHYSICAL EXAM  --------------------------------------------------------------------------------  T(C): 37 (07-19-21 @ 12:00), Max: 37.4 (07-19-21 @ 00:00)  HR: 112 (07-19-21 @ 18:30) (104 - 132)  BP: 105/65 (07-19-21 @ 08:15) (105/65 - 118/66)  RR: 18 (07-19-21 @ 18:25) (17 - 43)  SpO2: 100% (07-19-21 @ 18:30) (92% - 100%)  Wt(kg): --        07-18-21 @ 07:01  -  07-19-21 @ 07:00  --------------------------------------------------------  IN: 1762.6 mL / OUT: 1335 mL / NET: 427.6 mL    07-19-21 @ 07:01  -  07-19-21 @ 19:29  --------------------------------------------------------  IN: 849.7 mL / OUT: 1100 mL / NET: -250.3 mL      Physical Exam:  	    Gen: pale appearing resting comfortably   	 - JVD  	Pulm: decrease bs  no rales or ronchi or wheezing  	CV: RRR, S1S2; no rub III/VI M   	Abd: +BS, soft, nontender/nondistended  	: No suprapubic tenderness  	UE: Warm, no cyanosis  no clubbing,  no edema  	LE: Warm, no cyanosis  no clubbing, no edema  	Neuro: alert and oriented. speech coherent       LABS/STUDIES  --------------------------------------------------------------------------------              8.0    9.69  >-----------<  179      [07-19-21 @ 03:11]              24.5     132  |  101  |  36  ----------------------------<  156      [07-19-21 @ 03:11]  4.6   |  21  |  1.51        Ca     9.6     [07-19-21 @ 03:11]      Mg     1.8     [07-19-21 @ 03:11]      Phos  2.4     [07-19-21 @ 03:11]    TPro  5.6  /  Alb  2.9  /  TBili  0.9  /  DBili  x   /  AST  28  /  ALT  26  /  AlkPhos  95  [07-19-21 @ 03:11]    PT/INR: PT 12.5 , INR 1.04       [07-18-21 @ 01:17]  PTT: 77.1       [07-19-21 @ 03:11]      Creatinine Trend:  SCr 1.51 [07-19 @ 03:11]  SCr 1.58 [07-18 @ 01:17]  SCr 1.61 [07-17 @ 16:17]  SCr 1.40 [07-17 @ 03:32]  SCr 1.92 [07-16 @ 03:32]                  PTH -- (Ca 9.2)      [07-13-21 @ 23:49]   193  Vitamin D (25OH) 47.6      [07-13-21 @ 23:49]  TSH 2.32      [07-12-21 @ 08:24]  Lipid: chol 105, TG 89, HDL 51, LDL --      [07-12-21 @ 08:26]

## 2021-07-19 NOTE — PROGRESS NOTE ADULT - ASSESSMENT
85 yo F w/ h/o HTN and paroxysmal Afib on coumadin who presented with chest pain and syncope at home to [place] on , found to have trop I>40; underwent CTA negative for PE and transferred to NS. Initially normotensive and labs revealed hsTrop >10,000, lactate 2.5. EKG with Afib with RBBB, with Q waves on lateral leads. Bedside TTE with reduced EF ~15-20%. Repeat CT chest angio to r/o aortic dissection was negative for significant dissection. Underwent C  which showed non-obstructive coronaries. TTE with severe LV dysfunction (segmental with basal contractility and apical akinesis) with possibly severe AS (mean gradient 30, TINY 0.5). Became hypotensive on  (notably received metoprolol day prior). Transferred to CICU and started on /levophed and received IVF. Unclear etiology of severe LV dysfunction with elevated troponins in absence of non-obstructive coronaries - possibly Takotsubo's vs. embolic event which recanalized.     Over the weekend had 2 episodes of flash pulmonary edema which improved with IV Lasix and BiPAP support. She was also transitioned from dobutamine to milrinone in the setting of tachycardia. She remains on pressor support and her AFib is not rate controlled. She appears adequately supported on current dose of milrinone and her end organ function continues to improve.    Hemodynamics:   (mil 0.375, levo 0.3): CVP 6, PA 48/16/29, PA sat    ( 5, Levo 0.12) CVP 8, PA 41/12/22, MvO2 65%, CO/CI (F) 4.6/2.8, MAP 68, SVR 1043 dsc  7/15 ( 5, Levo 0.3) CVP 4, PA 47/13/26, PCWP 19, Mv02 67%, CO/CI (F) 4.6/2.8, SVR 1078 dsc, MAP 70, HR 95  : CVP 1, central sat 58%, CI 2.2    Cardiac Studies:  EKG: afib HR 94, RBBB  TTE  LVEF 24%, Stage III DD, nl RV size with RVSD, mod MR, severe AS, mod TR, est RVSP 52 mm Hg  C  minor luminal irregularities 87 yo F w/ h/o HTN and paroxysmal Afib on coumadin who presented with chest pain and syncope at home to [place] on , found to have trop I>40; underwent CTA negative for PE and transferred to NS. Initially normotensive and labs revealed hsTrop >10,000, lactate 2.5. EKG with Afib with RBBB, with Q waves on lateral leads. Bedside TTE with reduced EF ~15-20%. Repeat CT chest angio to r/o aortic dissection was negative for significant dissection. Underwent LHC  which showed non-obstructive coronaries. TTE with severe LV dysfunction (segmental with basal contractility and apical akinesis) with possibly severe AS (mean gradient 30, TINY 0.5). Became hypotensive on  (notably received metoprolol day prior). Transferred to CICU and started on /levophed and received IVF. Unclear etiology of severe LV dysfunction with elevated troponins in absence of non-obstructive coronaries - possibly Takotsubo's vs. embolic event which recanalized.     Over the weekend had 2 episodes of flash pulmonary edema which improved with IV Lasix and BiPAP support. She was also transitioned from dobutamine to milrinone in the setting of tachycardia. She remains on pressor support and her AFib is not rate controlled. She appears adequately supported on current dose of milrinone and her end organ function continues to improve.    Hemodynamics:   (mil 0.375, levo 0.3): CVP 6, PA 48/16/29, PA sat 57%, CO/CI (F) 4/2.4, MAP 73, SVR 1340   ( 5, levo 0.12) CVP 8, PA 41/12/22, MvO2 65%, CO/CI (F) 4.6/2.8, MAP 68, SVR 1043 dsc  7/15 ( 5, levo 0.3) CVP 4, PA 47/13/26, PCWP 19, Mv02 67%, CO/CI (F) 4.6/2.8, SVR 1078 dsc, MAP 70, HR 95  : CVP 1, central sat 58%, CI 2.2    Cardiac Studies:  EKG: afib HR 94, RBBB  TTE  LVEF 24%, Stage III DD, nl RV size with RVSD, mod MR, severe AS, mod TR, est RVSP 52 mm Hg  C  minor luminal irregularities

## 2021-07-19 NOTE — PROGRESS NOTE ADULT - SUBJECTIVE AND OBJECTIVE BOX
CICU DAY NOTE  Admission date: 7/11/21  Chief complaint/ Diagnosis: Severe AS   HPI: 85 yo lady PMHx of HTN and paroxysmal Afib on coumadin who was transferred to [facility] ED from [place] ED where she presented with 2 week hx of intermittent atypical chest pain, with acute exacerbation of chest pain radiating to back for the last 2 days, also associated with single episode of syncope at home today.   In the OS ED, vitals - HR 80-90s Afib, -130s, RR 20, and SpO2 98% on 2L NC. Unremarkable exam reported. Labs c/w CRISSY on CKD (Cr 1.9 compared to 1.4 in 2019), with trop >40, pBNP 8543, and CKMB 94.5. CT chest angio (90cc contrast) without evidence of PE but inconclusive study on aortic dissection. Was transferred to [facility] ED for further management.  At the [facility] ED, vitals - HR 90-100s Afib, /60 at the bedside, RR 20, and SpO2 95% on room air. On physical exam, with JVD ~8cm, without pertinent cardiac or respiratory exam findings, without associated LE edema. Labs c/w CRISSY, hsTrop >10,000, lactate 2.5. EKG with Afib with RBBB, with Q waves on lateral leads. Bedside TTE with reduced EF ~15-20%. Repeat CT chest angio to r/o aortic dissection was negative for significant dissection.    Interval history: acute sob/dyspnea   s/p Lasix 20 x 1 and Lasix 40 x 1  on BIPAP     REVIEW OF SYSTEMS  Denies CP, Palpitation [ X ] All other systems negative    MEDICATIONS  (STANDING)  aspirin enteric coated 81 milliGRAM(s) Oral daily  atorvastatin 80 milliGRAM(s) Oral at bedtime  chlorhexidine 2% Cloths 1 Application(s) Topical daily  heparin  Infusion 800 Unit(s)/Hr (8 mL/Hr) IV Continuous <Continuous>  lidocaine   Patch 1 Patch Transdermal daily  melatonin 5 milliGRAM(s) Oral at bedtime  milrinone Infusion 0.375 MICROgram(s)/kG/Min (6.65 mL/Hr) IV Continuous <Continuous>  multivitamin 1 Tablet(s) Oral daily  norepinephrine Infusion 0.2 MICROgram(s)/kG/Min (11.1 mL/Hr) IV Continuous <Continuous>  polyethylene glycol 3350 17 Gram(s) Oral daily  QUEtiapine 25 milliGRAM(s) Oral at bedtime  senna 2 Tablet(s) Oral at bedtime  sodium bicarbonate 650 milliGRAM(s) Oral three times a day    MEDICATIONS  (PRN):  acetaminophen   Tablet .. 650 milliGRAM(s) Oral every 6 hours PRN Temp greater or equal to 38C (100.4F), Mild Pain (1 - 3), Moderate Pain (4 - 6)  aluminum hydroxide/magnesium hydroxide/simethicone Suspension 30 milliLiter(s) Oral every 6 hours PRN Dyspepsia  sodium chloride 0.9% lock flush 10 milliLiter(s) IV Push every 1 hour PRN Pre/post blood products, medications, blood draw, and to maintain line patency    PAST MEDICAL & SURGICAL HISTORY:  Afib  Hypertension  Hyperparathyroidism  Osteoporosis  Dyslipidemia  Macular degeneration  Temporal arteritis  Spinal stenosis  Osteoarthritis  Sternal fracture    FAMILY HISTORY: Family history of brain cancer (Child)    Allergy:   adhesives (Rash)  No Known Drug Allergies    ICU Vital Signs Last 24 Hrs  T(C): 37.3 ( Max: 37.8)  HR: 110(98 - 132)  BP: 89/66 (87/50 - 114/60)  RR: 20  (6 - 51)  SpO2: 100%  (79% - 100%)    I&O's Summary  IN: 1730.3 mL / OUT: 1160 mL / NET: 570.3 mL    PHYSICAL EXAM  Appearance: Normal, NAD  HEAD:   Normocephalic  EYES:  PERRLA, conjunctiva and sclera clear  NECK: Supple, No JVD  CHEST/LUNG: Clear to auscultation bilaterally; No wheezing  HEART: Normal S1, S2. No murmurs, rubs, or gallops  ABDOMEN: + Bowel sounds, Soft, NT, ND   EXTREMITIES:  2+ Peripheral Pulses, No clubbing, cyanosis, or edema  NEUROLOGY: non-focal, aaox3  SKIN: No rashes or lesions    Interpretation of Telemetry: Sinus tachy                          8.0    9.69  )-----------( 179      ( 19 Jul 2021 03:11 )             24.5       07-19    132<L>  |  101  |  36<H>  ----------------------------<  156<H>  4.6   |  21<L>  |  1.51<H>    Ca    9.6      19 Jul 2021 03:11  Phos  2.4     07-19  Mg     1.8     07-19    TPro  5.6<L>  /  Alb  2.9<L>  /  TBili  0.9  /  DBili  x   /  AST  28  /  ALT  26  /  AlkPhos  95  07-19            ABG - ( 18 Jul 2021 01:07 )  pH, Arterial: 7.45  pH, Blood: x     /  pCO2: 28    /  pO2: 89    / HCO3: 20    / Base Excess: -3.4  /  SaO2: 98                  CAPILLARY BLOOD GLUCOSE         CICU DAY NOTE  Admission date: 7/11/21  Chief complaint/ Diagnosis: Severe AS   HPI: 87 yo lady PMHx of HTN and paroxysmal Afib on coumadin who was transferred to [facility] ED from [place] ED where she presented with 2 week hx of intermittent atypical chest pain, with acute exacerbation of chest pain radiating to back for the last 2 days, also associated with single episode of syncope at home today.   In the [facility] ED, vitals - HR 80-90s Afib, -130s, RR 20, and SpO2 98% on 2L NC. Unremarkable exam reported. Labs c/w CRISSY on CKD (Cr 1.9 compared to 1.4 in 2019), with trop >40, pBNP 8543, and CKMB 94.5. CT chest angio (90cc contrast) without evidence of PE but inconclusive study on aortic dissection. Was transferred to [facility] ED for further management.  At the [facility] ED, vitals - HR 90-100s Afib, /60 at the bedside, RR 20, and SpO2 95% on room air. On physical exam, with JVD ~8cm, without pertinent cardiac or respiratory exam findings, without associated LE edema. Labs c/w CRISSY, hsTrop >10,000, lactate 2.5. EKG with Afib with RBBB, with Q waves on lateral leads. Bedside TTE with reduced EF ~15-20%. Repeat CT chest angio to r/o aortic dissection was negative for significant dissection.    Interval history: acute sob/dyspnea   s/p Lasix 20 x 1 and Lasix 40 x 1  on BIPAP   mixed 57<62<44 CO/CI 4/2.4 SVR 1480 CVP 6-7 LA 1.6    REVIEW OF SYSTEMS  Denies CP, Palpitation [ X ] All other systems negative    MEDICATIONS  (STANDING)  aspirin enteric coated 81 milliGRAM(s) Oral daily  atorvastatin 80 milliGRAM(s) Oral at bedtime  chlorhexidine 2% Cloths 1 Application(s) Topical daily  heparin  Infusion 800 Unit(s)/Hr (8 mL/Hr) IV Continuous <Continuous>  lidocaine   Patch 1 Patch Transdermal daily  melatonin 5 milliGRAM(s) Oral at bedtime  milrinone Infusion 0.375 MICROgram(s)/kG/Min (6.65 mL/Hr) IV Continuous <Continuous>  multivitamin 1 Tablet(s) Oral daily  norepinephrine Infusion 0.2 MICROgram(s)/kG/Min (11.1 mL/Hr) IV Continuous <Continuous>  polyethylene glycol 3350 17 Gram(s) Oral daily  QUEtiapine 25 milliGRAM(s) Oral at bedtime  senna 2 Tablet(s) Oral at bedtime  sodium bicarbonate 650 milliGRAM(s) Oral three times a day    MEDICATIONS  (PRN):  acetaminophen   Tablet .. 650 milliGRAM(s) Oral every 6 hours PRN Temp greater or equal to 38C (100.4F), Mild Pain (1 - 3), Moderate Pain (4 - 6)  aluminum hydroxide/magnesium hydroxide/simethicone Suspension 30 milliLiter(s) Oral every 6 hours PRN Dyspepsia  sodium chloride 0.9% lock flush 10 milliLiter(s) IV Push every 1 hour PRN Pre/post blood products, medications, blood draw, and to maintain line patency    PAST MEDICAL & SURGICAL HISTORY:  Afib  Hypertension  Hyperparathyroidism  Osteoporosis  Dyslipidemia  Macular degeneration  Temporal arteritis  Spinal stenosis  Osteoarthritis  Sternal fracture    FAMILY HISTORY: Family history of brain cancer (Child)    Allergy:   adhesives (Rash)  No Known Drug Allergies    ICU Vital Signs Last 24 Hrs  T(C): 37.3 ( Max: 37.8)  HR: 110(98 - 132)  BP: 89/66 (87/50 - 114/60)  RR: 20  (6 - 51)  SpO2: 100%  (79% - 100%)    I&O's Summary  IN: 1730.3 mL / OUT: 1160 mL / NET: 570.3 mL    PHYSICAL EXAM  Appearance: Normal, NAD  HEAD:   Normocephalic  EYES:  PERRLA, conjunctiva and sclera clear  NECK: Supple, No JVD  CHEST/LUNG: Clear to auscultation bilaterally; No wheezing  HEART: Normal S1, S2. No murmurs, rubs, or gallops  ABDOMEN: + Bowel sounds, Soft, NT, ND   EXTREMITIES:  2+ Peripheral Pulses, No clubbing, cyanosis, or edema  NEUROLOGY: non-focal, aaox3  SKIN: No rashes or lesions    Interpretation of Telemetry: Sinus tachy                     8.0  <8.7<8.8  9.69  )-----------( 179                  24.5       132<134  |  101  |  36<H>  ----------------------------<  156<H>  4.6   |  21<L>  |  1.51<1.58<1.61    Ca    9.6      Phos  2.4     Mg     1.8  (repleted)  TPro  5.6<L>  /  Alb  2.9<L>  /  TBili  0.9  /  DBili  x   /  AST  28  /  ALT  26  /  AlkPhos  95      ABG - ( 18 Jul 2021 01:07 )  pH, Arterial: 7.45 /  pCO2: 28    /  pO2: 89    / HCO3: 20    / Base Excess: -3.4  /  SaO2: 98                       CICU DAY NOTE  Admission date: 7/11/21  Chief complaint/ Diagnosis: Severe AS   HPI: 85 yo lady PMHx of HTN and paroxysmal Afib on coumadin who was transferred to [facility] ED from [place] ED where she presented with 2 week hx of intermittent atypical chest pain, with acute exacerbation of chest pain radiating to back for the last 2 days, also associated with single episode of syncope at home today.   In the [facility] ED, vitals - HR 80-90s Afib, -130s, RR 20, and SpO2 98% on 2L NC. Unremarkable exam reported. Labs c/w CRISSY on CKD (Cr 1.9 compared to 1.4 in 2019), with trop >40, pBNP 8543, and CKMB 94.5. CT chest angio (90cc contrast) without evidence of PE but inconclusive study on aortic dissection. Was transferred to [facility] ED for further management.  At the [facility] ED, vitals - HR 90-100s Afib, /60 at the bedside, RR 20, and SpO2 95% on room air. On physical exam, with JVD ~8cm, without pertinent cardiac or respiratory exam findings, without associated LE edema. Labs c/w CRISSY, hsTrop >10,000, lactate 2.5. EKG with Afib with RBBB, with Q waves on lateral leads. Bedside TTE with reduced EF ~15-20%. Repeat CT chest angio to r/o aortic dissection was negative for significant dissection.    Interval history: acute sob/dyspnea   s/p Lasix 20 x 1 and Lasix 40 x 1  on BIPAP   currently on Milrinone 0.375, Levo@0.3 Hep  mixed 57<62<44 CO/CI 4/2.4 SVR 1480 CVP 6-7 LA 1.6    REVIEW OF SYSTEMS  Denies CP, Palpitation [ X ] All other systems negative    MEDICATIONS  (STANDING)  aspirin enteric coated 81 milliGRAM(s) Oral daily  atorvastatin 80 milliGRAM(s) Oral at bedtime  chlorhexidine 2% Cloths 1 Application(s) Topical daily  heparin  Infusion 800 Unit(s)/Hr (8 mL/Hr) IV Continuous <Continuous>  lidocaine   Patch 1 Patch Transdermal daily  melatonin 5 milliGRAM(s) Oral at bedtime  milrinone Infusion 0.375 MICROgram(s)/kG/Min (6.65 mL/Hr) IV Continuous <Continuous>  multivitamin 1 Tablet(s) Oral daily  norepinephrine Infusion 0.2 MICROgram(s)/kG/Min (11.1 mL/Hr) IV Continuous <Continuous>  polyethylene glycol 3350 17 Gram(s) Oral daily  QUEtiapine 25 milliGRAM(s) Oral at bedtime  senna 2 Tablet(s) Oral at bedtime  sodium bicarbonate 650 milliGRAM(s) Oral three times a day    MEDICATIONS  (PRN):  acetaminophen   Tablet .. 650 milliGRAM(s) Oral every 6 hours PRN Temp greater or equal to 38C (100.4F), Mild Pain (1 - 3), Moderate Pain (4 - 6)  aluminum hydroxide/magnesium hydroxide/simethicone Suspension 30 milliLiter(s) Oral every 6 hours PRN Dyspepsia  sodium chloride 0.9% lock flush 10 milliLiter(s) IV Push every 1 hour PRN Pre/post blood products, medications, blood draw, and to maintain line patency    PAST MEDICAL & SURGICAL HISTORY:  Afib  Hypertension  Hyperparathyroidism  Osteoporosis  Dyslipidemia  Macular degeneration  Temporal arteritis  Spinal stenosis  Osteoarthritis  Sternal fracture    FAMILY HISTORY: Family history of brain cancer (Child)    Allergy:   adhesives (Rash)  No Known Drug Allergies    ICU Vital Signs Last 24 Hrs  T(C): 37.3 ( Max: 37.8)  HR: 110(98 - 132)  BP: 89/66 (87/50 - 114/60)  RR: 20  (6 - 51)  SpO2: 100%  (79% - 100%)    I&O's Summary  IN: 1730.3 mL / OUT: 1160 mL / NET: 570.3 mL    PHYSICAL EXAM  Appearance: Normal, NAD  HEAD:   Normocephalic  EYES:  PERRLA, conjunctiva and sclera clear  NECK: Supple, No JVD  CHEST/LUNG: Clear to auscultation bilaterally; No wheezing  HEART: Normal S1, S2. No murmurs, rubs, or gallops  ABDOMEN: + Bowel sounds, Soft, NT, ND   EXTREMITIES:  2+ Peripheral Pulses, No clubbing, cyanosis, or edema  NEUROLOGY: non-focal, aaox3  SKIN: No rashes or lesions    Interpretation of Telemetry: Sinus tachy                     8.0  <8.7<8.8  9.69  )-----------( 179                  24.5       132<134  |  101  |  36<H>  ----------------------------<  156<H>  4.6   |  21<L>  |  1.51<1.58<1.61    Ca    9.6      Phos  2.4     Mg     1.8  (repleted)  TPro  5.6<L>  /  Alb  2.9<L>  /  TBili  0.9  /  DBili  x   /  AST  28  /  ALT  26  /  AlkPhos  95      ABG - ( 18 Jul 2021 01:07 )  pH, Arterial: 7.45 /  pCO2: 28    /  pO2: 89    / HCO3: 20    / Base Excess: -3.4  /  SaO2: 98

## 2021-07-19 NOTE — PROGRESS NOTE ADULT - ASSESSMENT
87 yo lady PMHx of HTN and paroxysmal Afib on coumadin who was transferred to [facility] ED from [place] ED where she presented with 2 week hx of intermittent chest pain, with acute exacerbation of chest pain radiating to back  associated with single episode of syncope at home on day of admission. 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      1- CKD III/IV with CRISSY   2- a fib  3- chest pains  on admission   4- CHF  5- aortic stenosis   6- hypercalcemia       crissy in setting of contrast nephropathy cr is improving slowly but seems to have stabilized at this level   pt also with severe AS   undergoing TAVR eval. awaiting improvement in renal function   strict I/O  suspect shpt due to underlying ckd but pt also with hypercalcemia as well   d.w CCU team when seen earlier

## 2021-07-19 NOTE — PROGRESS NOTE ADULT - SUBJECTIVE AND OBJECTIVE BOX
====================  CCU MIDNIGHT ROUNDS  ====================    ANDRE DE LUNA  [id number]  Patient is a 86y old  Female who presents with a chief complaint of late presentation MI (19 Jul 2021 19:27)      ====================  SUMMARY: 87 yo lady PMHx of HTN and paroxysmal Afib on coumadin who was transferred to [facility] ED from [place] ED where she presented with 2 week hx of intermittent atypical chest pain, with acute exacerbation of chest pain radiating to back for the last 2 days, also associated with single episode of syncope at home today.     In the OSH ED, vitals - HR 80-90s Afib, -130s, RR 20, and SpO2 98% on 2L NC. Unremarkable exam reported. Labs c/w CRISSY on CKD (Cr 1.9 compared to 1.4 in 2019), with trop >40, pBNP 8543, and CKMB 94.5. CT chest angio (90cc contrast) without evidence of PE but inconclusive study on aortic dissection. Was transferred to [facility] ED for further management.    At the [facility] ED, vitals - HR 90-100s Afib, /60 at the bedside, RR 20, and SpO2 95% on room air. On physical exam, with JVD ~8cm, without pertinent cardiac or respiratory exam findings, without associated LE edema. Labs c/w CRISSY, hsTrop >10,000, lactate 2.5. EKG with Afib with RBBB, with Q waves on lateral leads. Bedside TTE with reduced EF ~15-20%. Repeat CT chest angio to r/o aortic dissection was negative for significant dissection.    Admitted to CICU for close monitoring, chest pain management on nitro gtt, pending [facility] in AM      (12 Jul 2021 00:11)      Hospital Course:  7/12 transferred to CCU for further management   7/19 vasopressin added in effort to wean levo         ====================  VITALS (Last 12 hrs):  ====================    T(C): 37 (07-19-21 @ 12:00), Max: 37 (07-19-21 @ 12:00)  T(F): 98.6 (07-19-21 @ 12:00), Max: 98.6 (07-19-21 @ 12:00)  HR: 108 (07-19-21 @ 22:00) (99 - 126)  BP: --  BP(mean): --  ABP: 96/58 (07-19-21 @ 22:00) (74/56 - 120/74)  ABP(mean): 74 (07-19-21 @ 22:00) (62 - 94)  RR: 20 (07-19-21 @ 22:00) (17 - 34)  SpO2: 100% (07-19-21 @ 22:00) (95% - 100%)  Wt(kg): --  CVP(mm Hg): 14 (07-19-21 @ 22:00) (4 - 18)  CVP(cm H2O): --  CO: --  CI: --  PA: 57/24 (07-19-21 @ 22:00) (48/13 - 65/32)  PA(mean): 37 (07-19-21 @ 22:00) (28 - 46)  PCWP: --  SVR: --  PVR: --    I&O's Summary    18 Jul 2021 07:01  -  19 Jul 2021 07:00  --------------------------------------------------------  IN: 1762.6 mL / OUT: 1335 mL / NET: 427.6 mL    19 Jul 2021 07:01  -  19 Jul 2021 22:14  --------------------------------------------------------  IN: 905.2 mL / OUT: 1190 mL / NET: -284.8 mL            ====================  NEW LABS:  ====================                          8.0    9.69  )-----------( 179      ( 19 Jul 2021 03:11 )             24.5     07-19    132<L>  |  101  |  36<H>  ----------------------------<  156<H>  4.6   |  21<L>  |  1.51<H>    Ca    9.6      19 Jul 2021 03:11  Phos  2.4     07-19  Mg     1.8     07-19    TPro  5.6<L>  /  Alb  2.9<L>  /  TBili  0.9  /  DBili  x   /  AST  28  /  ALT  26  /  AlkPhos  95  07-19    PT/INR - ( 18 Jul 2021 01:17 )   PT: 12.5 sec;   INR: 1.04 ratio         PTT - ( 19 Jul 2021 03:11 )  PTT:77.1 sec      ABG - ( 18 Jul 2021 01:07 )  pH, Arterial: 7.45  pH, Blood: x     /  pCO2: 28    /  pO2: 89    / HCO3: 20    / Base Excess: -3.4  /  SaO2: 98                Blood Gas Source, Mixed: Mixed Blood Gas (07-19-21 @ 02:56)      ====================  PLAN:    ====================== NEUROLOGY=====================  A&Ox3, nonfocal   - Continue to monitor neuro status per protocol  - no acute issues     Insomnia  - seroquel + melatonin at bedtime     acetaminophen   Tablet .. 650 milliGRAM(s) Oral every 6 hours PRN Temp greater or equal to 38C (100.4F), Mild Pain (1 - 3), Moderate Pain (4 - 6)  melatonin 5 milliGRAM(s) Oral at bedtime  QUEtiapine 12.5 milliGRAM(s) Oral at bedtime    ==================== RESPIRATORY======================  Stable on RA, SpO2 97%  - continue pulse ox monitoring, follow ABGs       ====================CARDIOVASCULAR==================  Late presentation inferolateral STEMI c/b cardiogenic shock  - C revealing clear cors  - presentation possibly 2/2 tako vs severe AS vs myopericarditis   - cont ASA, statin   - Continue with Primacor gtt for inotropic support.    - c/w levo + vaso for pressor support, wean levo as tolerated   - volume status: tenuous, w/ recurrence of Flash pulmonary edema -> Lasix PRN for goal net even to 500 negative   - TTE 7/14: EF 24%, mod MR, LV dysfunction, decreased RV function     Severe AS  - Plan for BAV 7/20  - Structural will reassess as success of BAV determined     aspirin enteric coated 81 milliGRAM(s) Oral daily  atorvastatin 80 milliGRAM(s) Oral at bedtime  milrinone Infusion 0.375 MICROgram(s)/kG/Min (6.65 mL/Hr) IV Continuous <Continuous>  norepinephrine Infusion 0.05 MICROgram(s)/kG/Min (5.54 mL/Hr) IV Continuous <Continuous>    ===================HEMATOLOGIC/ONC ===================  Anemia  - monitor H&H/Plts   - heparin gtt     heparin  Infusion 800 Unit(s)/Hr (8 mL/Hr) IV Continuous <Continuous>    ===================== RENAL =========================  CRISSY on CKD, likely in setting of BLANCA and low flow  - SCr baseline appears ~1.4 (2019)  - Avoid nephrotoxins, renally dose meds  - intermittent diuresis for goal net even to 500 negative       ==================== GASTROINTESTINAL===================  - Tolerating mechanical soft diet   - Bowel regimen with senna/ Miralax.     aluminum hydroxide/magnesium hydroxide/simethicone Suspension 30 milliLiter(s) Oral every 6 hours PRN Dyspepsia  multivitamin 1 Tablet(s) Oral daily  polyethylene glycol 3350 17 Gram(s) Oral daily  senna 2 Tablet(s) Oral at bedtime  sodium bicarbonate 650 milliGRAM(s) Oral three times a day  sodium chloride 0.9% lock flush 10 milliLiter(s) IV Push every 1 hour PRN Pre/post blood products, medications, blood draw, and to maintain line patency    =======================    ENDOCRINE  =====================  No acute issues   - continue monitoring blood glucose closely for need to initiate sliding scale       ========================INFECTIOUS DISEASE================  Continue trending WBC and monitoring fever curve, no need for infectious w/u or prophylactic antimicrobial therapy at this time       Patient requires continuous monitoring with bedside rhythm monitoring, pulse ox monitoring, and intermittent blood gas analysis. Care plan discussed with ICU care team. Patient remained critical and at risk for life threatening decompensation.  Patient seen, examined and plan discussed with CCU team during rounds.     I have personally provided 35 minutes of critical care time excluding time spent on separate procedures.        Naresh Simpson PA-C CICU  ====================  CCU MIDNIGHT ROUNDS  ====================    ANDRE DE LUNA  [id number]  Patient is a 86y old  Female who presents with a chief complaint of late presentation MI (19 Jul 2021 19:27)      ====================  SUMMARY: 85 yo lady PMHx of HTN and paroxysmal Afib on coumadin who was transferred to [facility] ED from [place] ED where she presented with 2 week hx of intermittent atypical chest pain, with acute exacerbation of chest pain radiating to back for the last 2 days, also associated with single episode of syncope at home today.     In the OSH ED, vitals - HR 80-90s Afib, -130s, RR 20, and SpO2 98% on 2L NC. Unremarkable exam reported. Labs c/w CRISSY on CKD (Cr 1.9 compared to 1.4 in 2019), with trop >40, pBNP 8543, and CKMB 94.5. CT chest angio (90cc contrast) without evidence of PE but inconclusive study on aortic dissection. Was transferred to [facility] ED for further management.    At the [facility] ED, vitals - HR 90-100s Afib, /60 at the bedside, RR 20, and SpO2 95% on room air. On physical exam, with JVD ~8cm, without pertinent cardiac or respiratory exam findings, without associated LE edema. Labs c/w CRISSY, hsTrop >10,000, lactate 2.5. EKG with Afib with RBBB, with Q waves on lateral leads. Bedside TTE with reduced EF ~15-20%. Repeat CT chest angio to r/o aortic dissection was negative for significant dissection.    Admitted to CICU for close monitoring, chest pain management on nitro gtt, pending [facility] in AM      (12 Jul 2021 00:11)      Hospital Course:  7/12 transferred to CCU for further management   7/19 vasopressin added in effort to wean levo         ====================  VITALS (Last 12 hrs):  ====================    T(C): 37 (07-19-21 @ 12:00), Max: 37 (07-19-21 @ 12:00)  T(F): 98.6 (07-19-21 @ 12:00), Max: 98.6 (07-19-21 @ 12:00)  HR: 108 (07-19-21 @ 22:00) (99 - 126)  BP: --  BP(mean): --  ABP: 96/58 (07-19-21 @ 22:00) (74/56 - 120/74)  ABP(mean): 74 (07-19-21 @ 22:00) (62 - 94)  RR: 20 (07-19-21 @ 22:00) (17 - 34)  SpO2: 100% (07-19-21 @ 22:00) (95% - 100%)  Wt(kg): --  CVP(mm Hg): 14 (07-19-21 @ 22:00) (4 - 18)  CVP(cm H2O): --  CO: --  CI: --  PA: 57/24 (07-19-21 @ 22:00) (48/13 - 65/32)  PA(mean): 37 (07-19-21 @ 22:00) (28 - 46)  PCWP: --  SVR: --  PVR: --    I&O's Summary    18 Jul 2021 07:01  -  19 Jul 2021 07:00  --------------------------------------------------------  IN: 1762.6 mL / OUT: 1335 mL / NET: 427.6 mL    19 Jul 2021 07:01  -  19 Jul 2021 22:14  --------------------------------------------------------  IN: 905.2 mL / OUT: 1190 mL / NET: -284.8 mL            ====================  NEW LABS:  ====================                          8.0    9.69  )-----------( 179      ( 19 Jul 2021 03:11 )             24.5     07-19    132<L>  |  101  |  36<H>  ----------------------------<  156<H>  4.6   |  21<L>  |  1.51<H>    Ca    9.6      19 Jul 2021 03:11  Phos  2.4     07-19  Mg     1.8     07-19    TPro  5.6<L>  /  Alb  2.9<L>  /  TBili  0.9  /  DBili  x   /  AST  28  /  ALT  26  /  AlkPhos  95  07-19    PT/INR - ( 18 Jul 2021 01:17 )   PT: 12.5 sec;   INR: 1.04 ratio         PTT - ( 19 Jul 2021 03:11 )  PTT:77.1 sec      ABG - ( 18 Jul 2021 01:07 )  pH, Arterial: 7.45  pH, Blood: x     /  pCO2: 28    /  pO2: 89    / HCO3: 20    / Base Excess: -3.4  /  SaO2: 98                Blood Gas Source, Mixed: Mixed Blood Gas (07-19-21 @ 02:56)      ====================  PLAN:    ====================== NEUROLOGY=====================  A&Ox3, nonfocal   - Continue to monitor neuro status per protocol  - no acute issues     Insomnia  - seroquel + melatonin at bedtime     acetaminophen   Tablet .. 650 milliGRAM(s) Oral every 6 hours PRN Temp greater or equal to 38C (100.4F), Mild Pain (1 - 3), Moderate Pain (4 - 6)  melatonin 5 milliGRAM(s) Oral at bedtime  QUEtiapine 12.5 milliGRAM(s) Oral at bedtime    ==================== RESPIRATORY======================  Recurrent Flash Pulmonary Edema (Cardiogenic)  - Volume mgmt w/ Lasix PRN   - Weaned off Bipap, now on 5L NC w/ SpO2 >92%, continue ot wean as tolerated     ====================CARDIOVASCULAR==================  Late presentation inferolateral STEMI c/b cardiogenic shock  - LHC revealing clear cors  - presentation possibly 2/2 tako vs severe AS vs myopericarditis   - cont ASA, statin   - Continue with Primacor gtt for inotropic support.    - c/w levo + vaso for pressor support, wean levo as tolerated   - volume status: tenuous, w/ recurrence of Flash pulmonary edema -> Lasix PRN for goal net even to 500 negative   - TTE 7/14: EF 24%, mod MR, LV dysfunction, decreased RV function     Severe AS  - Plan for BAV 7/20  - Structural will reassess as success of BAV determined     paroxysmal AFib (on Coumadin)   - c/w Heparin gtt for therapeutic AC   - difficult to begin rate control in setting of double pressor requirement     aspirin enteric coated 81 milliGRAM(s) Oral daily  atorvastatin 80 milliGRAM(s) Oral at bedtime  milrinone Infusion 0.375 MICROgram(s)/kG/Min (6.65 mL/Hr) IV Continuous <Continuous>  norepinephrine Infusion 0.05 MICROgram(s)/kG/Min (5.54 mL/Hr) IV Continuous <Continuous>    ===================HEMATOLOGIC/ONC ===================  Anemia  - monitor H&H/Plts   - heparin gtt     heparin  Infusion 800 Unit(s)/Hr (8 mL/Hr) IV Continuous <Continuous>    ===================== RENAL =========================  CRISSY on CKD, likely in setting of BLANCA and low flow  - SCr baseline appears ~1.4 (2019)  - Avoid nephrotoxins, renally dose meds  - intermittent diuresis for goal net even to 500 negative       ==================== GASTROINTESTINAL===================  - Tolerating mechanical soft diet   - Bowel regimen with senna/ Miralax.     aluminum hydroxide/magnesium hydroxide/simethicone Suspension 30 milliLiter(s) Oral every 6 hours PRN Dyspepsia  multivitamin 1 Tablet(s) Oral daily  polyethylene glycol 3350 17 Gram(s) Oral daily  senna 2 Tablet(s) Oral at bedtime  sodium bicarbonate 650 milliGRAM(s) Oral three times a day  sodium chloride 0.9% lock flush 10 milliLiter(s) IV Push every 1 hour PRN Pre/post blood products, medications, blood draw, and to maintain line patency    =======================    ENDOCRINE  =====================  No acute issues   - continue monitoring blood glucose closely for need to initiate sliding scale       ========================INFECTIOUS DISEASE================  Continue trending WBC and monitoring fever curve, no need for infectious w/u or prophylactic antimicrobial therapy at this time       Patient requires continuous monitoring with bedside rhythm monitoring, pulse ox monitoring, and intermittent blood gas analysis. Care plan discussed with ICU care team. Patient remained critical and at risk for life threatening decompensation.  Patient seen, examined and plan discussed with CCU team during rounds.     I have personally provided 35 minutes of critical care time excluding time spent on separate procedures.        Naresh Simpson PA-C CICU

## 2021-07-19 NOTE — PROGRESS NOTE ADULT - ATTENDING COMMENTS
85 y/o F with newly diagnosed systolic dysfunction and aortic stenosis. Undergoing eval for BAV vs TAVR. Hemodynamically stable on current inotropes. Would not wean further until plan is made for structural intervention. Diuresis to keep net even to help with recurrent flash pulmonary edema.

## 2021-07-20 NOTE — PROGRESS NOTE ADULT - PROBLEM SELECTOR PLAN 3
- hemodynamically related with component of BLANCA  - continue inotropic support, optimization of fluid balance as above  - nephrology following  - gradually improving from peak Cr of 3.27 on 7/14
- hemodynamically related with component of BLANCA  - continue inotropic support, optimization of fluid balance as above  - nephrology following  - gradually improving from peak Cr of 3.27 on 7/14
- hemodynamically related with component of BLANCA  - continue inotropic support, optimization of fluid balance  - nephrology following  - gradually improving from peak Cr of 3.27 on 7/14
- hemodynamically related with component of BLANCA  - continue inotropic support, optimization of fluid balance as above  - nephrology following  - peak Cr of 3.27 on 7/14, now improved to 1.5-1.6

## 2021-07-20 NOTE — CHART NOTE - NSCHARTNOTEFT_GEN_A_CORE
The patient's brother Nancy are at bedside tonight and have been made aware of the patient's current clinical situation and have been brought up to speed on what has occurred throughout the course of the day, including cardiac arrest in the cath lab that had lasted ~5 rounds of CPR until ROSC. It has been made apparent, according to the patient's sons, that Mrs. Sharpe wishes were not consistent with being supported by a ventilator and any/other form Mechanical "Life Support", of whixh is recognized by the patient's family members as the Intraaortic Balloon Pump at bedside. The patient's son had expressed that they wish to meet the patient's wishes by removing the breathing tube, disconnecting her from the ventilator, and stopping all life supporting medications/drips, even if the result is death. The patient is now awake and alert, following commands and moving all extremities with spontaneous eye opening and attempts at speech with the ETT still in place. Upon bedside evaluation of the patient, I had asked if the patient wishes met what I was being told, and the patient actively agreed and was able to nod to acknowledge each of my questions. Family members at bedside were again explained the potential for rapid decline and death with a palliative extubation and discontinuation of pressors/inotropes, and once again had made it clear that this is what their mother would want and this is what they want for their mother.     Naresh Simpson PA-C [facility]

## 2021-07-20 NOTE — PROGRESS NOTE ADULT - ASSESSMENT
87 yo lady PMHx of HTN and paroxysmal Afib on coumadin who was transferred to [facility] ED from [place] ED where she presented with 2 week hx of intermittent chest pain, with acute exacerbation of chest pain radiating to back  associated with single episode of syncope at home on day of admission. 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      1- CKD III/IV with CRISSY   2- a fib  3- chest pains  on admission   4- CHF  5- aortic stenosis   6- hypercalcemia       crissy in setting of contrast nephropathy cr is improving slowly but seems to have stabilized at this level   pt also with severe AS   undergoing TAVR eval. for BAV  strict I/O  hypercalcemia due to primary hyperparathyroid to start sensipar 60 mg daily/endo consult   to have spep/ipep/bence jones d.w CCU team when seen earlier

## 2021-07-20 NOTE — PROGRESS NOTE ADULT - SUBJECTIVE AND OBJECTIVE BOX
Subjective:  - c/o nausea today and some discomfort over her lower chest/upper abdomen    Medications:  acetaminophen   Tablet .. 650 milliGRAM(s) Oral every 6 hours PRN  aluminum hydroxide/magnesium hydroxide/simethicone Suspension 30 milliLiter(s) Oral every 6 hours PRN  aspirin enteric coated 81 milliGRAM(s) Oral daily  atorvastatin 80 milliGRAM(s) Oral at bedtime  chlorhexidine 2% Cloths 1 Application(s) Topical daily  heparin  Infusion 800 Unit(s)/Hr IV Continuous <Continuous>  lidocaine   Patch 1 Patch Transdermal daily  melatonin 5 milliGRAM(s) Oral at bedtime  milrinone Infusion 0.5 MICROgram(s)/kG/Min IV Continuous <Continuous>  multivitamin 1 Tablet(s) Oral daily  norepinephrine Infusion 0.2 MICROgram(s)/kG/Min IV Continuous <Continuous>  polyethylene glycol 3350 17 Gram(s) Oral daily  QUEtiapine 25 milliGRAM(s) Oral at bedtime  senna 2 Tablet(s) Oral at bedtime  sodium bicarbonate 650 milliGRAM(s) Oral three times a day  sodium chloride 0.9% lock flush 10 milliLiter(s) IV Push every 1 hour PRN  vasopressin Infusion 0.04 Unit(s)/Min IV Continuous <Continuous>      ICU Vital Signs Last 24 Hrs  T(C): 36.5, Max: 37 (-19 @ 12:00)  HR: 106 (96 - 126)  BP: --  BP(mean): --  ABP: 94/56 (74/56 - 120/74)  ABP(mean): 72 (62 - 248)  RR: 28 (15 - 34)  SpO2: 98% (95% - 100%)    Weight in k.8 (21)  Weight in k.9 (21)      I&O's Summary Last 24 Hrs    IN: 1360.1 mL / OUT: 1955 mL / NET: -594.9 mL      Tele: Afib 100-120s    Physical Exam:    General: NAD.  Neck: JVP moderately elevated.  Respiratory: Clear to auscultation bilaterally  CV: Irregularly irregular, tachycardic. Normal S1 and S2. II/VI SM at RUSB. Radial pulses normal.   Abdomen: Soft, non-distended, non-tender  Skin: No skin lesions  Extremities: Warm, no edema  Neurology: Non-focal, alert and oriented times three.   Psych: Affect normal    Labs:    ( 21 @ 00:39 )               9.9    7.97  )--------( 147                  30.1     ( 21 @ 00:39 )     132  |  99  |  39  ---------------------<  148  4.7  |  22  |  1.59    Ca 9.9  Phos 3.4  Mg 1.7    ( 21 @ 00:39 )  TPro  5.5  /  Alb  2.9  /  TBili  1.1  /  DBili  x   /  AST  24  /  ALT  24  /  AlkPhos  92  ( 21 @ 03:11 )  TPro  5.6  /  Alb  2.9  /  TBili  0.9  /  DBili  x   /  AST  28  /  ALT  26  /  AlkPhos  95    PTT/PT/INR - ( 21 @ 00:39 )  PTT: 74.2 sec / PT: 13.2 sec / INR: 1.11 ratio    ( 21 @ 01:22 )  TropHS >89101 /    / CKMB x      ( 21 @ 18:04 )  TropHS >50293 /    / CKMB x        Serum Pro-Brain Natriuretic Peptide: 73407 (21 @ 21:34)    Oxygen Saturation, Mixed: 47 (21 @ 04:55)  Oxygen Saturation, Mixed: 45 (21 @ 02:29)    ABG - ( 21 @ 00:38 )  pH: 7.41  / pCO2: 40    / pO2: 132   / HCO3: 25    / Base Excess: .9    / SaO2: 99

## 2021-07-20 NOTE — PROGRESS NOTE ADULT - NUTRITIONAL ASSESSMENT
This patient has been assessed with a concern for Malnutrition and has been determined to have a diagnosis/diagnoses of Moderate protein-calorie malnutrition.    This patient is being managed with:   Diet NPO after Midnight-     NPO Start Date: 18-Jul-2021   NPO Start Time: 23:59  Entered: Jul 18 2021  8:20PM    Diet Mechanical Soft-  Low Sodium  Supplement Feeding Modality:  Oral  Glucerna Shake Cans or Servings Per Day:  2       Frequency:  Daily  Entered: Jul 16 2021  2:08PM    
This patient has been assessed with a concern for Malnutrition and has been determined to have a diagnosis/diagnoses of Moderate protein-calorie malnutrition.    This patient is being managed with:   Diet NPO after Midnight-     NPO Start Date: 19-Jul-2021   NPO Start Time: 23:59  Except Medications  Entered: Jul 19 2021 10:04AM    Diet Mechanical Soft-  Low Sodium  Supplement Feeding Modality:  Oral  Glucerna Shake Cans or Servings Per Day:  2       Frequency:  Daily  Entered: Jul 16 2021  2:08PM    
This patient has been assessed with a concern for Malnutrition and has been determined to have a diagnosis/diagnoses of Moderate protein-calorie malnutrition.    This patient is being managed with:   Diet Mechanical Soft-  Low Sodium  Supplement Feeding Modality:  Oral  Ensure Enlive Cans or Servings Per Day:  2       Frequency:  Daily  Entered: Jul 14 2021  3:18PM    
This patient has been assessed with a concern for Malnutrition and has been determined to have a diagnosis/diagnoses of Moderate protein-calorie malnutrition.    This patient is being managed with:   Diet Mechanical Soft-  Low Sodium  Supplement Feeding Modality:  Oral  Glucerna Shake Cans or Servings Per Day:  2       Frequency:  Daily  Entered: Jul 16 2021  2:08PM    
This patient has been assessed with a concern for Malnutrition and has been determined to have a diagnosis/diagnoses of Moderate protein-calorie malnutrition.    This patient is being managed with:   Diet NPO after Midnight-     NPO Start Date: 19-Jul-2021   NPO Start Time: 23:59  Except Medications  Entered: Jul 19 2021 10:04AM    Diet Mechanical Soft-  Low Sodium  Supplement Feeding Modality:  Oral  Glucerna Shake Cans or Servings Per Day:  2       Frequency:  Daily  Entered: Jul 16 2021  2:08PM    
This patient has been assessed with a concern for Malnutrition and has been determined to have a diagnosis/diagnoses of Moderate protein-calorie malnutrition.    This patient is being managed with:   Diet Mechanical Soft-  Low Sodium  Supplement Feeding Modality:  Oral  Ensure Enlive Cans or Servings Per Day:  2       Frequency:  Daily  Entered: Jul 14 2021  3:18PM    
This patient has been assessed with a concern for Malnutrition and has been determined to have a diagnosis/diagnoses of Moderate protein-calorie malnutrition.    This patient is being managed with:   Diet Mechanical Soft-  Low Sodium  Supplement Feeding Modality:  Oral  Glucerna Shake Cans or Servings Per Day:  2       Frequency:  Daily  Entered: Jul 16 2021  2:08PM    
This patient has been assessed with a concern for Malnutrition and has been determined to have a diagnosis/diagnoses of Moderate protein-calorie malnutrition.    This patient is being managed with:   Diet NPO after Midnight-     NPO Start Date: 19-Jul-2021   NPO Start Time: 23:59  Except Medications  Entered: Jul 19 2021 10:04AM    Diet Mechanical Soft-  Low Sodium  Supplement Feeding Modality:  Oral  Glucerna Shake Cans or Servings Per Day:  2       Frequency:  Daily  Entered: Jul 16 2021  2:08PM

## 2021-07-20 NOTE — CHART NOTE - NSCHARTNOTEFT_GEN_A_CORE
Multiple discussions were held at patients bedside with her two sons, magdalena and garry regarding patients prognosis. When patient had first arrived to CICU from her procedure, she was on no sedation, not triggering breaths on the ventilator, no gag reflex, and equally round and reactive pupils. Patients sons were  A discussion was held with Dr. Hebert (CICU attending), Dr. Summers (Interventional attending), and family at bedside. Decision was made to proceed with head CT to assess for anoxic brain injury. Patient was transported to CT which did not reveal anoxia.   Patient started to wake up, become alert, and follow all commands. The patients family had a discussion with her and the decision was made to proceed with full comfort measures. Multiple providers explained to the family that she would not be able to support herself with the breathing tube removed and all pressors/inotropes discontinued. They verbalize understanding of the ultimate outcome and made it clear that theyre most important wish for their mother is full comfort.   Dr. Hebert and Dr. Summers were notified and we will proceed with a compassionate extubation.   WILD signed and placed in chart. Emotional support was provided to the family.

## 2021-07-20 NOTE — CHART NOTE - NSCHARTNOTESELECT_GEN_ALL_CORE
Event Note
Event Note
Femoral sheath/Event Note
Goals of Care Discussion/Event Note
Goals of Care/Event Note
Nutrition Services
VTE risk assessment/Event Note

## 2021-07-20 NOTE — PROGRESS NOTE ADULT - ATTENDING COMMENTS
Continue current hemodynamic support with Milrinone, Levophed. Plan for BAV today. Can attempt to wean drips post-procedure.

## 2021-07-20 NOTE — PROGRESS NOTE ADULT - PROBLEM SELECTOR PROBLEM 1
Severe aortic stenosis
Severe aortic stenosis
Acute systolic heart failure

## 2021-07-20 NOTE — PROGRESS NOTE ADULT - ATTENDING COMMENTS
Admitted with cardiogenic shock and severe aortic stenosis  A+Ox3  Cardiogenic shock requiring Milrinone; also requiring Levophed and Vasopressin infusions  SvO2 47%, Milrinone increased  Structural Heart following for aortic stenosis - for BAV today +/- IABP  TTE with severely reduced LVEF  O2 sats mid 90s on nasal cannula; intermittently requiring Bipap as fluid status is tenuous and she becomes overloaded easily  NPO  Non-oliguric CRISSY with labile volume status - target 500 cc negative  H/H decreasing but acceptable on Heparin drip for afib  Afebrile, no antibiotics  Sugars controlled  ALEXA Holm/Arnav 7/14 and phil 7/13

## 2021-07-20 NOTE — PROGRESS NOTE ADULT - SUBJECTIVE AND OBJECTIVE BOX
====================  CCU MIDNIGHT ROUNDS  ====================    ANDRE DE LUNA  [id number]  Patient is a 86y old  Female who presents with a chief complaint of late presentation MI (20 Jul 2021 11:36)      ====================  SUMMARY: 87 yo lady PMHx of HTN and paroxysmal Afib on coumadin who was transferred to [facility] ED from [facility] ED where she presented with 2 week hx of intermittent atypical chest pain, with acute exacerbation of chest pain radiating to back for the last 2 days, also associated with single episode of syncope at home today.     In the OSH ED, vitals - HR 80-90s Afib, -130s, RR 20, and SpO2 98% on 2L NC. Unremarkable exam reported. Labs c/w CRISSY on CKD (Cr 1.9 compared to 1.4 in 2019), with trop >40, pBNP 8543, and CKMB 94.5. CT chest angio (90cc contrast) without evidence of PE but inconclusive study on aortic dissection. Was transferred to [facility] ED for further management.    At the [facility] ED, vitals - HR 90-100s Afib, /60 at the bedside, RR 20, and SpO2 95% on room air. On physical exam, with JVD ~8cm, without pertinent cardiac or respiratory exam findings, without associated LE edema. Labs c/w CRISSY, hsTrop >10,000, lactate 2.5. EKG with Afib with RBBB, with Q waves on lateral leads. Bedside TTE with reduced EF ~15-20%. Repeat CT chest angio to r/o aortic dissection was negative for significant dissection.    Admitted to CICU for close monitoring, chest pain management on nitro gtt, pending [facility] in AM      (12 Jul 2021 00:11)      Hospital Course:  7/12 transferred to CCU for further management   7/19 vasopressin added in effort to wean levo       ====================  VITALS (Last 12 hrs):  ====================    T(C): 36.8 (07-20-21 @ 11:00), Max: 36.8 (07-20-21 @ 11:00)  T(F): 98.2 (07-20-21 @ 11:00), Max: 98.2 (07-20-21 @ 11:00)  HR: 108 (07-20-21 @ 19:30) (96 - 112)  BP: --  BP(mean): --  ABP: 136/42 (07-20-21 @ 19:30) (86/46 - 153/47)  ABP(mean): 78 (07-20-21 @ 19:30) (62 - 88)  RR: 22 (07-20-21 @ 19:30) (17 - 33)  SpO2: 94% (07-20-21 @ 19:30) (90% - 100%)  Wt(kg): --  CVP(mm Hg): 7 (07-20-21 @ 19:30) (5 - 23)  CVP(cm H2O): --  CO: --  CI: --  PA: 35/19 (07-20-21 @ 19:30) (35/19 - 57/25)  PA(mean): 26 (07-20-21 @ 19:30) (25 - 38)  PCWP: --  SVR: --  PVR: --    I&O's Summary    19 Jul 2021 07:01  -  20 Jul 2021 07:00  --------------------------------------------------------  IN: 1360.1 mL / OUT: 1955 mL / NET: -594.9 mL    20 Jul 2021 07:01  -  20 Jul 2021 19:54  --------------------------------------------------------  IN: 354.9 mL / OUT: 920 mL / NET: -565.1 mL        Mode: AC/ CMV (Assist Control/ Continuous Mandatory Ventilation)  RR (machine): 18  TV (machine): 450  FiO2: 100  PEEP: 10  ITime: 1  MAP: 15  PIP: 30      ====================  NEW LABS:  ====================                          7.5    8.97  )-----------( 189      ( 20 Jul 2021 15:26 )             22.6     07-20    137  |  99  |  37<H>  ----------------------------<  210<H>  4.0   |  22  |  1.51<H>    Ca    11.5<H>      20 Jul 2021 18:57  Phos  6.6     07-20  Mg     1.8     07-20    TPro  4.8<L>  /  Alb  2.3<L>  /  TBili  1.2  /  DBili  x   /  AST  88<H>  /  ALT  48<H>  /  AlkPhos  87  07-20    PT/INR - ( 20 Jul 2021 18:57 )   PT: 13.3 sec;   INR: 1.11 ratio         PTT - ( 20 Jul 2021 18:57 )  PTT:24.9 sec      ABG - ( 20 Jul 2021 18:53 )  pH, Arterial: 7.31  pH, Blood: x     /  pCO2: 52    /  pO2: 83    / HCO3: 26    / Base Excess: -.5   /  SaO2: 95                Blood Gas Source, Mixed: Mixed Blood Gas (07-20-21 @ 18:53)  Blood Gas Source, Mixed: Mixed Blood Gas (07-20-21 @ 17:32)  Blood Gas Source, Mixed: Mixed Blood Gas (07-20-21 @ 16:50)  Blood Gas Source, Mixed: Mixed Blood Gas (07-20-21 @ 14:45)  Blood Gas Source, Mixed: Mixed Blood Gas (07-20-21 @ 04:55)  Blood Gas Source, Mixed: Mixed Blood Gas (07-20-21 @ 02:29)  Blood Gas Source, Mixed: Mixed Blood Gas (07-20-21 @ 00:38)      ====================  PLAN:  ====================== NEUROLOGY=====================  A&Ox3, nonfocal   - Continue to monitor neuro status per protocol  - no acute issues     Insomnia  - seroquel + melatonin at bedtime     acetaminophen   Tablet .. 650 milliGRAM(s) Oral every 6 hours PRN Temp greater or equal to 38C (100.4F), Mild Pain (1 - 3), Moderate Pain (4 - 6)  melatonin 5 milliGRAM(s) Oral at bedtime  QUEtiapine 12.5 milliGRAM(s) Oral at bedtime    ==================== RESPIRATORY======================  Recurrent Flash Pulmonary Edema (Cardiogenic)  - Volume mgmt w/ Lasix PRN   - Weaned off Bipap, now on 5L NC w/ SpO2 >92%, continue ot wean as tolerated     ====================CARDIOVASCULAR==================  Late presentation inferolateral STEMI c/b cardiogenic shock  - LHC revealing clear cors  - presentation possibly 2/2 tako vs severe AS vs myopericarditis   - cont ASA, statin   - Continue with Primacor gtt for inotropic support.    - c/w levo + vaso for pressor support, wean levo as tolerated   - volume status: tenuous, w/ recurrence of Flash pulmonary edema -> Lasix PRN for goal net even to 500 negative   - TTE 7/14: EF 24%, mod MR, LV dysfunction, decreased RV function     Severe AS  - Plan for BAV 7/20  - Structural will reassess as success of BAV determined     paroxysmal AFib (on Coumadin)   - c/w Heparin gtt for therapeutic AC   - difficult to begin rate control in setting of double pressor requirement     aspirin enteric coated 81 milliGRAM(s) Oral daily  atorvastatin 80 milliGRAM(s) Oral at bedtime  milrinone Infusion 0.375 MICROgram(s)/kG/Min (6.65 mL/Hr) IV Continuous <Continuous>  norepinephrine Infusion 0.05 MICROgram(s)/kG/Min (5.54 mL/Hr) IV Continuous <Continuous>    ===================HEMATOLOGIC/ONC ===================  Anemia  - monitor H&H/Plts   - heparin gtt     heparin  Infusion 800 Unit(s)/Hr (8 mL/Hr) IV Continuous <Continuous>    ===================== RENAL =========================  CRISSY on CKD, likely in setting of BLANCA and low flow  - SCr baseline appears ~1.4 (2019)  - Avoid nephrotoxins, renally dose meds  - intermittent diuresis for goal net even to 500 negative       ==================== GASTROINTESTINAL===================  - Tolerating mechanical soft diet   - Bowel regimen with senna/ Miralax.     aluminum hydroxide/magnesium hydroxide/simethicone Suspension 30 milliLiter(s) Oral every 6 hours PRN Dyspepsia  multivitamin 1 Tablet(s) Oral daily  polyethylene glycol 3350 17 Gram(s) Oral daily  senna 2 Tablet(s) Oral at bedtime  sodium bicarbonate 650 milliGRAM(s) Oral three times a day  sodium chloride 0.9% lock flush 10 milliLiter(s) IV Push every 1 hour PRN Pre/post blood products, medications, blood draw, and to maintain line patency    =======================    ENDOCRINE  =====================  No acute issues   - continue monitoring blood glucose closely for need to initiate sliding scale       ========================INFECTIOUS DISEASE================  Continue trending WBC and monitoring fever curve, no need for infectious w/u or prophylactic antimicrobial therapy at this time       Patient requires continuous monitoring with bedside rhythm monitoring, pulse ox monitoring, and intermittent blood gas analysis. Care plan discussed with ICU care team. Patient remained critical and at risk for life threatening decompensation.  Patient seen, examined and plan discussed with CCU team during rounds.     I have personally provided 35 minutes of critical care time excluding time spent on separate procedures.        Naresh Simpson PA-C CICU

## 2021-07-20 NOTE — PROGRESS NOTE ADULT - REASON FOR ADMISSION
late presentation MI
Heart Failure  Aortic Stenosis
late presentation MI

## 2021-07-20 NOTE — PROGRESS NOTE ADULT - THIS PATIENT HAS THE FOLLOWING CONDITION(S)/DIAGNOSES ON THIS ADMISSION:
None
Shock
None
None
severe AS
None
Late presentation MI and severe AS
Shock/Heart Failure/Renal Disease
Shock/Renal Disease
severe AS

## 2021-07-20 NOTE — PROGRESS NOTE ADULT - ASSESSMENT
While this patient has survived an intraoperative cardiac arrest and prolonged and heroic CPR at a very advanced age, her prognosis and long term outcome remain grave or uncertain.

## 2021-07-20 NOTE — PROGRESS NOTE ADULT - SUBJECTIVE AND OBJECTIVE BOX
Presented for Trans-catheter Balloon Aortic Valvotomy (BAV) to OR 23 with cardiac anesthesia. Patient had a critically stenosed aortic valve with cardiogenic shock on Milrinone Norepinephrine and Vasopressin. Patient was very orthopneic and hence was done with anesthesia, intubated.   During the procedure, patient developed VT/VF resistant to external cardiac massage, 5 mg of Epi, Vasopressin 20 units,, CaCl2 2gm, Atropine 1 mg, NaHCO3 150 meq, Amiodarone 300 mg, Transvenous pacing.   At that point, after 15-20 minutes of full ACLS, it seemed that further attempts at resuscitation would be futile but her rhythm and hemodynamics somewhat unexpectedly stabilized and a decision was made to continue with the BAV with an IABP in place.  Patient went into massive pulmonary edema with blood in the ETT but still able to maintain BP with Pacing and IABP. Patient was completely assystolic without transvenous pacing and had very little intrinsic ejection or BP without the IABP. Patient was brought in a stable condition to the CICU. Presented for Trans-catheter Balloon Aortic Valvotomy (BAV) to OR 23 with cardiac anesthesia. Patient had a critically stenosed aortic valve with cardiogenic shock on Milrinone Norepinephrine and Vasopressin. Patient was very orthopneic and hence was done with anesthesia, with Generel Endotracheal Anesthesia (Intubation).   During the procedure, patient developed VT/VF resistant to external cardiac massage, 5 mg of Epi, Vasopressin 20 units,, CaCl2 2gm, Atropine 1 mg, NaHCO3 150 meq, Amiodarone 300 mg, Transvenous pacing.   At that point, after 15-20 minutes of full ACLS, it seemed that further attempts at resuscitation would be futile but her rhythm and hemodynamics somewhat unexpectedly stabilized and a decision was made to continue with the BAV with an IABP in place.  Patient went into massive pulmonary edema with blood in the ETT but still able to maintain BP with Pacing and IABP. Patient was completely assystolic without transvenous pacing and had very little intrinsic ejection or BP without the IABP. Patient was brought in a stable condition to the CICU. Presented for Trans-catheter Balloon Aortic Valvotomy (BAV) to OR 23 with cardiac anesthesia. Patient had a critically stenosed aortic valve with cardiogenic shock on Milrinone Norepinephrine and Vasopressin. Patient was very orthopneic and hence was done with anesthesia, with General Endotracheal Anesthesia (Intubation).   During the procedure, patient developed VT/VF resistant to DC Shock 200 J X 7, external cardiac massage, 5 mg of Epi, Vasopressin 20 units,, CaCl2 2gm, Atropine 1 mg, NaHCO3 150 meq, Amiodarone 300 mg, Transvenous pacing.   At that point, after 15-20 minutes of full ACLS, it seemed that further attempts at resuscitation would be futile,  her rhythm and hemodynamics somewhat unexpectedly stabilized and a decision was made to continue with the BAV with an IABP in place.  Patient went into massive pulmonary edema with blood in the ETT but still able to maintain BP with Pacing and IABP. Patient was completely asystolic without transvenous pacing and had very little intrinsic ejection or BP without the IABP. Patient was brought in a stable condition to the CICU.

## 2021-07-20 NOTE — PROGRESS NOTE ADULT - PROBLEM SELECTOR PLAN 1
- continue milrinone at 0.5 mcg/kg/min  - trend hemodynamics and markers of end organ function (LFTs, lactate, Mv02)  - continue diuresis for goal CVP 6-8  - wean levophed for target SBP >/= 90, MAP >65

## 2021-07-20 NOTE — PROGRESS NOTE ADULT - SUBJECTIVE AND OBJECTIVE BOX
Events:    Review Of Systems:  Constitutional: denies fever, chills, Fatigue   HEENT: denies Blurred vision, Eye Pain, Headache   Respiratory: denies Cough, Wheezing , Shortness of breath  Cardiovascular: denies Chest Pain, Palpitations,  RENEE   Gastrointestinal: denies Abdominal Pain, Diarrhea, Constipation   Genitourinary: denies Nocturia, Dysuria, Incontinence  Extremities: denies Swelling, Joint Pain  Neurologic: denies Focal deficit, Paresthesias, Syncope  Lymphatic: denies Swelling, Lymphadenopathy   Skin: denies Rash, Ecchymoses, Wounds   Psychiatry: denies Depression, Suicidal/Homicidal Ideation, anxiety  [X ] 10 point review of systems is otherwise negative except as mentioned above         Medications:  acetaminophen   Tablet .. 650 milliGRAM(s) Oral every 6 hours PRN  aluminum hydroxide/magnesium hydroxide/simethicone Suspension 30 milliLiter(s) Oral every 6 hours PRN  aspirin enteric coated 81 milliGRAM(s) Oral daily  atorvastatin 80 milliGRAM(s) Oral at bedtime  chlorhexidine 2% Cloths 1 Application(s) Topical daily  heparin  Infusion 800 Unit(s)/Hr IV Continuous <Continuous>  lidocaine   Patch 1 Patch Transdermal daily  melatonin 5 milliGRAM(s) Oral at bedtime  milrinone Infusion 0.5 MICROgram(s)/kG/Min IV Continuous <Continuous>  multivitamin 1 Tablet(s) Oral daily  norepinephrine Infusion 0.2 MICROgram(s)/kG/Min IV Continuous <Continuous>  polyethylene glycol 3350 17 Gram(s) Oral daily  QUEtiapine 25 milliGRAM(s) Oral at bedtime  senna 2 Tablet(s) Oral at bedtime  sodium bicarbonate 650 milliGRAM(s) Oral three times a day  sodium chloride 0.9% lock flush 10 milliLiter(s) IV Push every 1 hour PRN  vasopressin Infusion 0.04 Unit(s)/Min IV Continuous <Continuous>    PMH/PSH/FH/SH: [ ] Unchanged  Vitals:  T(C): 36.8 (21 @ 05:00), Max: 37.1 (21 @ 08:00)  HR: 100 (21 @ 07:00) (96 - 126)  BP: 105/65 (21 @ 08:15) (105/65 - 118/66)  BP(mean): 77 (07-19-21 @ 08:15) (77 - 79)  RR: 15 (21 @ 07:00) (15 - 34)  SpO2: 99% (21 @ 07:00) (92% - 100%)  Wt(kg): --  Daily     Daily Weight in k.8 (2021 05:00)  I&O's Summary    2021 07:01  -  2021 07:00  --------------------------------------------------------  IN: 1360.1 mL / OUT: 1925 mL / NET: -564.9 mL        Physical Exam:  Appearance: [ ] Normal [ ] NAD  Eyes: [ ] PERRL [ ] EOMI  HENT: [ ] Normal oral [place] [ ]NC/AT  Cardiovascular: [ ] S1 [ ] S2 [ ] RRR [ ] No m/r/g [ ]No edema [ ] JVP  Procedural Access Site: [ ] No hematoma [ ] Non-tender to palpation [ ] 2+ pulse [ ] No bruit [ ] No Ecchymosis  Respiratory: [ ] Clear to auscultation bilaterally  Gastrointestinal: [ ] Soft [ ] Non-tender [ ] Non-distended [ ] BS+  Musculoskeletal: [ ] No clubbing [ ] No joint deformity   Neurologic: [ ] Non-focal  Lymphatic: [ ] No lymphadenopathy  Psychiatry: [ ] AAOx3 [ ] Mood & affect appropriate  Skin: [ ] No rashes [ ] No ecchymoses [ ] No cyanosis        132<L>  |  99  |  39<H>  ----------------------------<  148<H>  4.7   |  22  |  1.59<H>    Ca    9.9      2021 00:39  Phos  3.4       Mg     1.7     20    TPro  5.5<L>  /  Alb  2.9<L>  /  TBili  1.1  /  DBili  x   /  AST  24  /  ALT  24  /  AlkPhos  92  07-20    PT/INR - ( 2021 00:39 )   PT: 13.2 sec;   INR: 1.11 ratio         PTT - ( 2021 00:39 )  PTT:74.2 sec              ECG:    Echo:    Stress Testing:     Cath:    Imaging:    Interpretation of Telemetry:   HPI:  85 yo F w/ h/o HTN and paroxysmal Afib on coumadin who presented with chest pain and syncope at home to [place] on , found to have trop I>40; underwent CTA negative for PE and transferred to NS. Initially normotensive and labs revealed hsTrop >10,000, lactate 2.5. EKG with Afib with RBBB, with Q waves on lateral leads. Bedside TTE with reduced EF ~15-20%. Repeat CT chest angio to r/o aortic dissection was negative for significant dissection. Underwent LHC  which showed non-obstructive coronaries. TTE with severe LV dysfunction (segmental with basal contractility and apical akinesis) with possibly severe AS (mean gradient 30, TINY 0.5). Became hypotensive on  (notably received metoprolol day prior). Transferred to CICU and started on /levophed and received IVF. Unclear etiology of severe LV dysfunction with elevated troponins in absence of non-obstructive coronaries - possibly Takotsubo's vs. embolic event which recanalized. Over the weekend had 2 episodes of flash pulmonary edema which improved with IV Lasix and BiPAP support. She was also transitioned from dobutamine to milrinone in the setting of tachycardia. She remains on pressor support and her AFib is not rate controlled. She appears adequately supported on current dose of milrinone and her end organ function continues to improve.    Hemodynamics:   (mil 0.375, levo 0.3): CVP 6, PA 48/16/29, PA sat 57%, CO/CI (F) 4/2.4, MAP 73, SVR 1340   ( 5, levo 0.12) CVP 8, PA 41/12/22, MvO2 65%, CO/CI (F) 4.6/2.8, MAP 68, SVR 1043 dsc  7/15 ( 5, levo 0.3) CVP 4, PA 47/13/26, PCWP 19, Mv02 67%, CO/CI (F) 4.6/2.8, SVR 1078 dsc, MAP 70, HR 95  : CVP 1, central sat 58%, CI 2.2    Cardiac Studies:  EKG: afib HR 94, RBBB  TTE  LVEF 24%, Stage III DD, nl RV size with RVSD, mod MR, severe AS, mod TR, est RVSP 52 mm Hg  LHC  minor luminal irregularities    Events:    Review Of Systems:  Constitutional: denies fever, chills, Fatigue   HEENT: denies Blurred vision, Eye Pain, Headache   Respiratory: denies Cough, Wheezing , Shortness of breath  Cardiovascular: denies Chest Pain, Palpitations,  RENEE   Gastrointestinal: denies Abdominal Pain, Diarrhea, Constipation   Genitourinary: denies Nocturia, Dysuria, Incontinence  Extremities: denies Swelling, Joint Pain  Neurologic: denies Focal deficit, Paresthesias, Syncope  Lymphatic: denies Swelling, Lymphadenopathy   Skin: denies Rash, Ecchymoses, Wounds   Psychiatry: denies Depression, Suicidal/Homicidal Ideation, anxiety  [X ] 10 point review of systems is otherwise negative except as mentioned above         Medications:  acetaminophen   Tablet .. 650 milliGRAM(s) Oral every 6 hours PRN  aluminum hydroxide/magnesium hydroxide/simethicone Suspension 30 milliLiter(s) Oral every 6 hours PRN  aspirin enteric coated 81 milliGRAM(s) Oral daily  atorvastatin 80 milliGRAM(s) Oral at bedtime  chlorhexidine 2% Cloths 1 Application(s) Topical daily  heparin  Infusion 800 Unit(s)/Hr IV Continuous <Continuous>  lidocaine   Patch 1 Patch Transdermal daily  melatonin 5 milliGRAM(s) Oral at bedtime  milrinone Infusion 0.5 MICROgram(s)/kG/Min IV Continuous <Continuous>  multivitamin 1 Tablet(s) Oral daily  norepinephrine Infusion 0.2 MICROgram(s)/kG/Min IV Continuous <Continuous>  polyethylene glycol 3350 17 Gram(s) Oral daily  QUEtiapine 25 milliGRAM(s) Oral at bedtime  senna 2 Tablet(s) Oral at bedtime  sodium bicarbonate 650 milliGRAM(s) Oral three times a day  sodium chloride 0.9% lock flush 10 milliLiter(s) IV Push every 1 hour PRN  vasopressin Infusion 0.04 Unit(s)/Min IV Continuous <Continuous>    PMH/PSH/FH/SH: [ ] Unchanged  Vitals:  T(C): 36.8 (21 @ 05:00), Max: 37.1 (21 @ 08:00)  HR: 100 (21 @ 07:00) (96 - 126)  BP: 105/65 (21 @ 08:15) (105/65 - 118/66)  BP(mean): 77 (21 @ 08:15) (77 - 79)  RR: 15 (21 @ 07:00) (15 - 34)  SpO2: 99% (21 @ 07:00) (92% - 100%)  Wt(kg): --  Daily     Daily Weight in k.8 (2021 05:00)  I&O's Summary    2021 07:01  -  2021 07:00  --------------------------------------------------------  IN: 1360.1 mL / OUT: 1925 mL / NET: -564.9 mL        Physical Exam:  Appearance: [ ] Normal [ ] NAD  Eyes: [ ] PERRL [ ] EOMI  HENT: [ ] Normal oral mus[place]a [ ]NC/AT  Cardiovascular: [ ] S1 [ ] S2 [ ] RRR [ ] No m/r/g [ ]No edema [ ] JVP  Procedural Access Site: [ ] No hematoma [ ] Non-tender to palpation [ ] 2+ pulse [ ] No bruit [ ] No Ecchymosis  Respiratory: [ ] Clear to auscultation bilaterally  Gastrointestinal: [ ] Soft [ ] Non-tender [ ] Non-distended [ ] BS+  Musculoskeletal: [ ] No clubbing [ ] No joint deformity   Neurologic: [ ] Non-focal  Lymphatic: [ ] No lymphadenopathy  Psychiatry: [ ] AAOx3 [ ] Mood & affect appropriate  Skin: [ ] No rashes [ ] No ecchymoses [ ] No cyanosis        132<L>  |  99  |  39<H>  ----------------------------<  148<H>  4.7   |  22  |  1.59<H>    Ca    9.9      2021 00:39  Phos  3.4       Mg     1.7         TPro  5.5<L>  /  Alb  2.9<L>  /  TBili  1.1  /  DBili  x   /  AST  24  /  ALT  24  /  AlkPhos  92  07-20    PT/INR - ( 2021 00:39 )   PT: 13.2 sec;   INR: 1.11 ratio         PTT - ( 2021 00:39 )  PTT:74.2 sec              ECG:    Echo:    Stress Testing:     Cath:    Imaging:    Interpretation of Telemetry:   HPI:  85 yo F w/ h/o HTN and paroxysmal Afib on coumadin who presented with chest pain and syncope at home to [place] on , found to have trop I>40; underwent CTA negative for PE and transferred to NS. Initially normotensive and labs revealed hsTrop >10,000, lactate 2.5. EKG with Afib with RBBB, with Q waves on lateral leads. Bedside TTE with reduced EF ~15-20%. Repeat CT chest angio to r/o aortic dissection was negative for significant dissection. Underwent LHC  which showed non-obstructive coronaries. TTE with severe LV dysfunction (segmental with basal contractility and apical akinesis) with possibly severe AS (mean gradient 30, TINY 0.5). Became hypotensive on  (notably received metoprolol day prior). Transferred to CICU and started on /levophed and received IVF. Unclear etiology of severe LV dysfunction with elevated troponins in absence of non-obstructive coronaries - possibly Takotsubo's vs. embolic event which recanalized. Over the weekend had 2 episodes of flash pulmonary edema which improved with IV Lasix and BiPAP support. She was also transitioned from dobutamine to milrinone in the setting of tachycardia. She remains on pressor support and her AFib is not rate controlled. She appears adequately supported on current dose of milrinone and her end organ function continues to improve.    Events: Plan fr BAV today.     Review Of Systems:  No complaints of pain. Did not want to lay flat t check CV as pt states she cannot tolerate it. She was sleeping comfortably.     Medications:  acetaminophen   Tablet .. 650 milliGRAM(s) Oral every 6 hours PRN  aluminum hydroxide/magnesium hydroxide/simethicone Suspension 30 milliLiter(s) Oral every 6 hours PRN  aspirin enteric coated 81 milliGRAM(s) Oral daily  atorvastatin 80 milliGRAM(s) Oral at bedtime  chlorhexidine 2% Cloths 1 Application(s) Topical daily  heparin  Infusion 800 Unit(s)/Hr IV Continuous <Continuous>  lidocaine   Patch 1 Patch Transdermal daily  melatonin 5 milliGRAM(s) Oral at bedtime  milrinone Infusion 0.5 MICROgram(s)/kG/Min IV Continuous <Continuous>  multivitamin 1 Tablet(s) Oral daily  norepinephrine Infusion 0.2 MICROgram(s)/kG/Min IV Continuous <Continuous>  polyethylene glycol 3350 17 Gram(s) Oral daily  QUEtiapine 25 milliGRAM(s) Oral at bedtime  senna 2 Tablet(s) Oral at bedtime  sodium bicarbonate 650 milliGRAM(s) Oral three times a day  sodium chloride 0.9% lock flush 10 milliLiter(s) IV Push every 1 hour PRN  vasopressin Infusion 0.04 Unit(s)/Min IV Continuous <Continuous>    Vitals:  T(C): 36.8 (21 @ 05:00), Max: 37.1 (21 @ 08:00)  HR: 100 (21 @ 07:00) (96 - 126)  BP: 105/65 (21 @ 08:15) (105/65 - 118/66)  BP(mean): 77 (21 @ 08:15) (77 - 79)  RR: 15 (21 @ 07:00) (15 - 34)  SpO2: 99% (21 @ 07:00) (92% - 100%)    Daily     Daily Weight in k.8 (2021 05:00)  I&O's Summary    2021 07:01  -  2021 07:00  --------------------------------------------------------  IN: 1360.1 mL / OUT: 1925 mL / NET: -564.9 mL    Physical Exam:  Appearance: [X ] Normal [X ] NAD. Fragile  Eyes: [X ] PERRL [ X] EOMI  HENT: [ x] Normal oral muscosa [ X]NC/AT  Cardiovascular: [ X] S1 [X] S2 irregular . + Murmur. No edema. No JVD  Procedural Access Site: [facility] PA-C C/D/I without bleeding, induration, or hematoma.   Respiratory: [ X] Clear to auscultation bilaterally  Gastrointestinal: [ X] Soft [X ] Non-tender [X ] Non-distended   Musculoskeletal: [ X] No clubbing [X ] No joint deformity   Neurologic: [X ] Non-focal  Lymphatic: [X ] No lymphadenopathy  Psychiatry: [ X] AAOx3 [ X] Mood & affect appropriate  Skin: [X ] No rashes [ X] No ecchymoses [X ] No cyanosis        132<L>  |  99  |  39<H>  ----------------------------<  148<H>  4.7   |  22  |  1.59<H>    Ca    9.9      2021 00:39  Phos  3.4       Mg     1.7         TPro  5.5<L>  /  Alb  2.9<L>  /  TBili  1.1  /  DBili  x   /  AST  24  /  ALT  24  /  AlkPhos  92      PT/INR - ( 2021 00:39 )   PT: 13.2 sec;   INR: 1.11 ratio      PTT - ( 2021 00:39 )  PTT:74.2 sec    ECG:  RBBB w/ occasional PVCs    Echo: < from: Transthoracic Echocardiogram (21 @ 14:57) >  1. Calcified trileaflet aortic valve with decreased  opening. Peak transaortic valve gradient equals 55 mm Hg,  mean transaortic valve gradient equals 32 mm Hg, estimated  aortic valve area equals 0.5 sqcm (by continuity equation),  aortic valve velocity time integral equals 64 cm, the DVI=  0.15, consistent with critical aortic stenosis-however, the  stroke volume is severely decreased. Minimal aortic  regurgitation.  2. The left ventricle is mildly dilated by volume.  3. There is severe global hypokinesis with minor regional  variation.  There is septal flattening.  The left  ventricular function is severely decreased.  The LVEF=  25-30%.  4. The right ventricle is moderately dilated with  mild-moderately reduced function.  There is both systolic and diastolic septal flattening  consistent with right ventricular pressure/volume overload.  There is a device wire in the right heart.  5. The estimated right atrial pressure is elevated.  6. Estimated right ventricular systolic pressure equals 80  mm Hg, assuming right atrial pressure equals 13 mm Hg,  consistent with severe pulmonary hypertension.  7. Normal tricuspid valve. Severe tricuspid regurgitation.  VCW= 0.7cm    Cath: < from: Cardiac Cath Lab - Adult (21 @ 13:23) >  CORONARY VESSELS: The coronary circulation is right dominant.  LM:   --  LM: Angiography showed minor luminal irregularities with no flow  limiting lesions.  LAD:   --  LAD: Angiography showed minor luminal irregularities with no  flow limiting lesions.  CX:   --  Circumflex: Angiography showed minor luminal irregularities with  no flow limiting lesions.  RCA:   --  RCA: Angiography showed minor luminal irregularities with no  flow limiting lesions.    Imaging: < from: Xray Chest 1 View- PORTABLE-Routine (Xray Chest 1 View- PORTABLE-Routine in AM.) (21 @ 03:53) >    Suboptimal study. Both costophrenic angles are not included in this study. The heart is enlarged. Mild pulmonary vascular congestion. A [place]-Cayla catheter was placed and the tip is in the main pulmonary artery. No pneumothorax.        Interpretation of Telemetry:   HPI:  87 yo F w/ h/o HTN and paroxysmal Afib on coumadin who presented with chest pain and syncope at home to [place] on , found to have trop I>40; underwent CTA negative for PE and transferred to NS. Initially normotensive and labs revealed hsTrop >10,000, lactate 2.5. EKG with Afib with RBBB, with Q waves on lateral leads. Bedside TTE with reduced EF ~15-20%. Repeat CT chest angio to r/o aortic dissection was negative for significant dissection. Underwent LHC  which showed non-obstructive coronaries. TTE with severe LV dysfunction (segmental with basal contractility and apical akinesis) with possibly severe AS (mean gradient 30, TINY 0.5). Became hypotensive on  (notably received metoprolol day prior). Transferred to CICU and started on /levophed and received IVF. Unclear etiology of severe LV dysfunction with elevated troponins in absence of non-obstructive coronaries - possibly Takotsubo's vs. embolic event which recanalized. Over the weekend had 2 episodes of flash pulmonary edema which improved with IV Lasix and BiPAP support. She was also transitioned from dobutamine to milrinone in the setting of tachycardia. She remains on pressor support and her AFib is not rate controlled. She appears adequately supported on current dose of milrinone and her end organ function continues to improve.    Events: Plan fr BAV today.     Review Of Systems:  No complaints of pain. Did not want to lay flat t check CV as pt states she cannot tolerate it. She was sleeping comfortably.     Medications:  acetaminophen   Tablet .. 650 milliGRAM(s) Oral every 6 hours PRN  aluminum hydroxide/magnesium hydroxide/simethicone Suspension 30 milliLiter(s) Oral every 6 hours PRN  aspirin enteric coated 81 milliGRAM(s) Oral daily  atorvastatin 80 milliGRAM(s) Oral at bedtime  chlorhexidine 2% Cloths 1 Application(s) Topical daily  heparin  Infusion 800 Unit(s)/Hr IV Continuous <Continuous>  lidocaine   Patch 1 Patch Transdermal daily  melatonin 5 milliGRAM(s) Oral at bedtime  milrinone Infusion 0.5 MICROgram(s)/kG/Min IV Continuous <Continuous>  multivitamin 1 Tablet(s) Oral daily  norepinephrine Infusion 0.2 MICROgram(s)/kG/Min IV Continuous <Continuous>  polyethylene glycol 3350 17 Gram(s) Oral daily  QUEtiapine 25 milliGRAM(s) Oral at bedtime  senna 2 Tablet(s) Oral at bedtime  sodium bicarbonate 650 milliGRAM(s) Oral three times a day  sodium chloride 0.9% lock flush 10 milliLiter(s) IV Push every 1 hour PRN  vasopressin Infusion 0.04 Unit(s)/Min IV Continuous <Continuous>    Vitals:  T(C): 36.8 (21 @ 05:00), Max: 37.1 (21 @ 08:00)  HR: 100 (21 @ 07:00) (96 - 126)  BP: 105/65 (21 @ 08:15) (105/65 - 118/66)  BP(mean): 77 (21 @ 08:15) (77 - 79)  RR: 15 (21 @ 07:00) (15 - 34)  SpO2: 99% (21 @ 07:00) (92% - 100%)    Daily     Daily Weight in k.8 (2021 05:00)  I&O's Summary    2021 07:01  -  2021 07:00  --------------------------------------------------------  IN: 1360.1 mL / OUT: 1925 mL / NET: -564.9 mL    Physical Exam:  Appearance: [X ] Normal [X ] NAD. Fragile  Eyes: [X ] PERRL [ X] EOMI  HENT: [ x] Normal oral muscosa [ X]NC/AT  Cardiovascular: [ X] S1 [X] S2 irregular . + Murmur. No edema. No JVD  Procedural Access Site: [facility] PA-C C/D/I without bleeding, induration, or hematoma.   Respiratory: [ X] Clear to auscultation bilaterally  Gastrointestinal: [ X] Soft [X ] Non-tender [X ] Non-distended   Musculoskeletal: [ X] No clubbing [X ] No joint deformity   Neurologic: [X ] Non-focal  Lymphatic: [X ] No lymphadenopathy  Psychiatry: [ X] AAOx3 [ X] Mood & affect appropriate  Skin: [X ] No rashes [ X] No ecchymoses [X ] No cyanosis        132<L>  |  99  |  39<H>  ----------------------------<  148<H>  4.7   |  22  |  1.59<H>    Ca    9.9      2021 00:39  Phos  3.4       Mg     1.7         TPro  5.5<L>  /  Alb  2.9<L>  /  TBili  1.1  /  DBili  x   /  AST  24  /  ALT  24  /  AlkPhos  92      PT/INR - ( 2021 00:39 )   PT: 13.2 sec;   INR: 1.11 ratio      PTT - ( 2021 00:39 )  PTT:74.2 sec    ECG:  RBBB w/ occasional PVCs    Echo: < from: Transthoracic Echocardiogram (21 @ 14:57) >  1. Calcified trileaflet aortic valve with decreased  opening. Peak transaortic valve gradient equals 55 mm Hg,  mean transaortic valve gradient equals 32 mm Hg, estimated  aortic valve area equals 0.5 sqcm (by continuity equation),  aortic valve velocity time integral equals 64 cm, the DVI=  0.15, consistent with critical aortic stenosis-however, the  stroke volume is severely decreased. Minimal aortic  regurgitation.  2. The left ventricle is mildly dilated by volume.  3. There is severe global hypokinesis with minor regional  variation.  There is septal flattening.  The left  ventricular function is severely decreased.  The LVEF=  25-30%.  4. The right ventricle is moderately dilated with  mild-moderately reduced function.  There is both systolic and diastolic septal flattening  consistent with right ventricular pressure/volume overload.  There is a device wire in the right heart.  5. The estimated right atrial pressure is elevated.  6. Estimated right ventricular systolic pressure equals 80  mm Hg, assuming right atrial pressure equals 13 mm Hg,  consistent with severe pulmonary hypertension.  7. Normal tricuspid valve. Severe tricuspid regurgitation.  VCW= 0.7cm    Cath: < from: Cardiac Cath Lab - Adult (21 @ 13:23) >  CORONARY VESSELS: The coronary circulation is right dominant.  LM:   --  LM: Angiography showed minor luminal irregularities with no flow  limiting lesions.  LAD:   --  LAD: Angiography showed minor luminal irregularities with no  flow limiting lesions.  CX:   --  Circumflex: Angiography showed minor luminal irregularities with  no flow limiting lesions.  RCA:   --  RCA: Angiography showed minor luminal irregularities with no  flow limiting lesions.    Imaging: < from: Xray Chest 1 View- PORTABLE-Routine (Xray Chest 1 View- PORTABLE-Routine in AM.) (21 @ 03:53) >    Suboptimal study. Both costophrenic angles are not included in this study. The heart is enlarged. Mild pulmonary vascular congestion. A [place]-Cayla catheter was placed and the tip is in the main pulmonary artery. No pneumothorax.    Interpretation of Telemetry: Af w/ occasional PVCs

## 2021-07-20 NOTE — PROGRESS NOTE ADULT - ASSESSMENT
87 yo F w/ h/o HTN and paroxysmal Afib on coumadin who presented with chest pain and syncope at home to [place] on , found to have trop I>40; underwent CTA negative for PE and transferred to NS. Initially normotensive and labs revealed hsTrop >10,000, lactate 2.5. EKG with Afib with RBBB, with Q waves on lateral leads. Bedside TTE with reduced EF ~15-20%. Repeat CT chest angio to r/o aortic dissection was negative for significant dissection. Underwent LHC  which showed non-obstructive coronaries. TTE with severe LV dysfunction (segmental with basal contractility and apical akinesis) with possibly severe AS (mean gradient 30, TINY 0.5). Became hypotensive on  (notably received metoprolol day prior). Transferred to CICU and started on /levophed and received IVF. Unclear etiology of severe LV dysfunction with elevated troponins in absence of non-obstructive coronaries - possibly Takotsubo's vs. embolic event which recanalized.     Over the weekend had 2 episodes of flash pulmonary edema which improved with IV Lasix and BiPAP support. She was also transitioned from dobutamine to milrinone in the setting of tachycardia. She remains on pressor support and her AFib is not rate controlled. She currently appears volume overloaded on exam with low CI on high dose of milrinone, but her end organ function remains stable.    Hemodynamics:   (mil 0.5, levo 0.13, vaso 0.04): CVP 14, PA 54/23/36, PA sat 47%, CO/CI (F) 2.9/1.8, MAP 74, SVR 1655   (mil 0.375, levo 0.3): CVP 6, PA 48/16/29, PA sat 57%, CO/CI (F) 4/2.4, MAP 73, SVR 1340  7/16 ( 5, levo 0.12) CVP 8, PA 41/12/22, MvO2 65%, CO/CI (F) 4.6/2.8, MAP 68, SVR 1043 dsc  7/15 ( 5, levo 0.3) CVP 4, PA 47/13/26, PCWP 19, Mv02 67%, CO/CI (F) 4.6/2.8, SVR 1078 dsc, MAP 70, HR 95  : CVP 1, central sat 58%, CI 2.2    Cardiac Studies:  EKG: afib HR 94, RBBB  TTE  LVEF 24%, Stage III DD, nl RV size with RVSD, mod MR, severe AS, mod TR, est RVSP 52 mm Hg  LHC  minor luminal irregularities

## 2021-07-20 NOTE — PROGRESS NOTE ADULT - CRITICAL CARE SERVICES PROVIDED
Critical care services provided

## 2021-07-20 NOTE — PROGRESS NOTE ADULT - ASSESSMENT
86 year old F  86 year old F HTN, pAF who preseted [facility] chest pain found t have a late presentaton MI in the setting of severe AS cmplicated by acute systolic heart failure requiring inotrpes and pressors. Her course has been complicated by multiple episodes of flash ulmonary edema due to severe As now with plan to undergo BAV.     Neuro  - No complaints of pain.  86 year old F HTN, pAF who preseted [facility] chest pain found t have a late presentaton MI in the setting of severe AS cmplicated by acute systolic heart failure requiring inotrpes and pressors. Her course has been complicated by multiple episodes of flash ulmonary edema due to severe As now with plan to undergo BAV.     Neuro  - No complaints of pain. PT seeing pt she has gotten out of bed to a chair.     Resp  - Currently on 4 L and oxygenating well. Lungs clear.  86 year old F HTN, pAF who preseted [facility] chest pain found t have a late presentaton MI in the setting of severe AS cmplicated by acute systolic heart failure requiring inotrpes and pressors. Her course has been complicated by multiple episodes of flash ulmonary edema due to severe As now with plan to undergo BAV.     Neuro  - No complaints of pain. PT seeing pt she has gotten out of bed to a chair.     Resp  - Currently on 4 L and oxygenating well. Lungs clear. No phlegm or pulm consolidations noted.     CV        Severe As 86 year old F HTN, pAF who preseted [facility] chest pain found t have a late presentaton MI in the setting of severe AS cmplicated by acute systolic heart failure requiring inotrpes and pressors. Her course has been complicated by multiple episodes of flash ulmonary edema due to severe As now with plan to undergo BAV.     Neuro  - No complaints of pain. PT seeing pt she has gotten out of bed to a chair.     Resp  - Currently on 4 L and oxygenating well. Lungs clear. No phlegm or pulm consolidations noted.     CV  Acute systolic heart failure       Severe As 86 year old F HTN, pAF who preseted [facility] chest pain found t have a late presentaton MI in the setting of severe AS cmplicated by acute systolic heart failure requiring inotrpes and pressors. Her course has been complicated by multiple episodes of flash ulmonary edema due to severe As now with plan to undergo BAV.     Neuro  - No complaints of pain. PT seeing pt she has gotten out of bed to a chair.     Resp  - Currently on 4 L and oxygenating well. Lungs clear. No phlegm or pulm consolidations noted.     CV  Acute systolic heart failure   - Apears fluid verloaded based on the pt statigng it is uncomfortable to lay flat. On POCUS IVC is 2.12 and CVP 10. Lasix 40 mg ordered. Hemodynamics showing a CI of 1.8 and a SVO2 f 47%. Will reealuate following BAV will likely also get an IABP to assist in afterload. SVR 1800. Continues to require Levophed at .13 and Vasopressin 0.04. Continue Milrinone at .5    Severe As  - Plan for BAV today, with eventual TAVR  - Continue ASA, lipitor    GI  - Continue diet, bowel regimen      - [place] in place    Renal  - Pt has stabilized Cr around 1.5, electrolytes are stable    ID  - No WBC, PA-C to be changed tomorrow    Endo  - Glucose in the 140s    Heme  - Pt is therapeutic on Heparin gtt for Af    Lamar mattson, DNP

## 2021-07-20 NOTE — CHART NOTE - NSCHARTNOTEFT_GEN_A_CORE
I spoke with the patient's two sons, Phoenix and Fred. They were adamant that they wanted to withdraw care on the patient because she would never want to be intubated or on life support. I told him that as a part of the consent for the procedure she was told about the possibility that she would need to be on a ventilator and she agreed to the procedure nonetheless, so she was ok with the possibility of being on support short term. I told them that we did not know if her condition would improve and it seemed premature to withdraw care at this time (her neurologic function did improve to the point where she was following commands and indicating that she was in pain). I suggested getting a head CT so we could have some objective evidence as to whether she had neurologic damage; it was done and was benign. The patient's family was still insistent that the patient be disconnected from the ventilator despite the fact she was showing some signs of improvement and despite the fact that they were told that removing her from the ventilator would accelerate her death. Per their wishes she was liberated from the ventilator overnight and ultimately passed away without being resuscitated per her sons' wishes.

## 2021-07-20 NOTE — PROGRESS NOTE ADULT - SUBJECTIVE AND OBJECTIVE BOX
[facility]   [phone]  RENAL FOLLOW UP NOTE  --------------------------------------------------------------------------------  Chief Complaint:    24 hour events/subjective:    seen earlier c/o being sob       PAST HISTORY  --------------------------------------------------------------------------------  No significant changes to PMH, PSH, FHx, SHx, unless otherwise noted    ALLERGIES & MEDICATIONS  --------------------------------------------------------------------------------  Allergies    adhesives (Rash)  No Known Drug Allergies    Intolerances      Standing Inpatient Medications  aspirin enteric coated 81 milliGRAM(s) Oral daily  atorvastatin 80 milliGRAM(s) Oral at bedtime  chlorhexidine 2% Cloths 1 Application(s) Topical daily  heparin  Infusion 800 Unit(s)/Hr IV Continuous <Continuous>  lidocaine   Patch 1 Patch Transdermal daily  magnesium sulfate  IVPB 2 Gram(s) IV Intermittent once  melatonin 5 milliGRAM(s) Oral at bedtime  milrinone Infusion 0.5 MICROgram(s)/kG/Min IV Continuous <Continuous>  multivitamin 1 Tablet(s) Oral daily  norepinephrine Infusion 0.2 MICROgram(s)/kG/Min IV Continuous <Continuous>  polyethylene glycol 3350 17 Gram(s) Oral daily  QUEtiapine 25 milliGRAM(s) Oral at bedtime  senna 2 Tablet(s) Oral at bedtime  sodium bicarbonate 650 milliGRAM(s) Oral three times a day  vasopressin Infusion 0.04 Unit(s)/Min IV Continuous <Continuous>    PRN Inpatient Medications  acetaminophen   Tablet .. 650 milliGRAM(s) Oral every 6 hours PRN  aluminum hydroxide/magnesium hydroxide/simethicone Suspension 30 milliLiter(s) Oral every 6 hours PRN  sodium chloride 0.9% lock flush 10 milliLiter(s) IV Push every 1 hour PRN      REVIEW OF SYSTEMS  --------------------------------------------------------------------------------    Gen: denies  fevers/chills,  CVS: denies chest pain/palpitations  Resp:  +  SOB/Cough  GI: Denies N/V/Abd pain  : Denies dysuria      VITALS/PHYSICAL EXAM  --------------------------------------------------------------------------------  T(C): 36.8 (07-20-21 @ 11:00), Max: 36.9 (07-20-21 @ 00:00)  HR: 108 (07-20-21 @ 20:00) (96 - 114)  BP: --  RR: 22 (07-20-21 @ 20:00) (15 - 33)  SpO2: 94% (07-20-21 @ 20:00) (90% - 100%)  Wt(kg): --  Height (cm): 167.6 (07-20-21 @ 15:04)  Weight (kg): 59.1 (07-20-21 @ 15:04)  BMI (kg/m2): 21 (07-20-21 @ 15:04)  BSA (m2): 1.67 (07-20-21 @ 15:04)      07-19-21 @ 07:01  -  07-20-21 @ 07:00  --------------------------------------------------------  IN: 1360.1 mL / OUT: 1955 mL / NET: -594.9 mL    07-20-21 @ 07:01  -  07-20-21 @ 20:33  --------------------------------------------------------  IN: 389.7 mL / OUT: 920 mL / NET: -530.3 mL      Physical Exam:  	  Gen: pale appearing resting comfortably   + swan jenna   	Pulm: decrease bs  no rales or ronchi or wheezing  	CV: RRR, S1S2; no rub III/VI M   	Abd: +BS, soft, nontender/nondistended  	: No suprapubic tenderness  	UE: Warm, no cyanosis  no clubbing,  no edema  	LE: Warm, no cyanosis  no clubbing, no edema  	Neuro: alert and oriented. speech coherent       LABS/STUDIES  --------------------------------------------------------------------------------              9.7    18.08 >-----------<  142      [07-20-21 @ 18:57]              30.5     137  |  99  |  37  ----------------------------<  210      [07-20-21 @ 18:57]  4.0   |  22  |  1.51        Ca     11.5     [07-20-21 @ 18:57]      Mg     1.8     [07-20-21 @ 18:57]      Phos  6.6     [07-20-21 @ 18:57]    TPro  4.8  /  Alb  2.3  /  TBili  1.2  /  DBili  x   /  AST  88  /  ALT  48  /  AlkPhos  87  [07-20-21 @ 18:57]    PT/INR: PT 13.3 , INR 1.11       [07-20-21 @ 18:57]  PTT: 24.9       [07-20-21 @ 18:57]      Creatinine Trend:  SCr 1.51 [07-20 @ 18:57]  SCr 1.59 [07-20 @ 00:39]  SCr 1.51 [07-19 @ 03:11]  SCr 1.58 [07-18 @ 01:17]  SCr 1.61 [07-17 @ 16:17]                  PTH -- (Ca 9.2)      [07-13-21 @ 23:49]   193  Vitamin D (25OH) 47.6      [07-13-21 @ 23:49]  TSH 2.32      [07-12-21 @ 08:24]  Lipid: chol 105, TG 89, HDL 51, LDL --      [07-12-21 @ 08:26]

## 2021-07-20 NOTE — PROGRESS NOTE ADULT - PROVIDER SPECIALTY LIST ADULT
CCU
CCU
Critical Care
JOSE G
JOSE G
CCU
CCU
JOSE G
Nephrology
Nephrology
Anesthesia
JOSE G
Nephrology
Structural Heart
Structural Heart
CCU
Heart Failure
JOSE G
JOSE G
Nephrology
Structural Heart
JOSE G
JOSE G
Heart Failure
Structural Heart
Heart Failure
Heart Failure

## 2021-07-20 NOTE — PROGRESS NOTE ADULT - PROBLEM SELECTOR PROBLEM 3
CRISSY (acute kidney injury)

## 2021-07-20 NOTE — PROGRESS NOTE ADULT - PROBLEM SELECTOR PLAN 4
- resolved with optimization as above
- nearly resolved with optimization as above

## 2021-07-20 NOTE — PROGRESS NOTE ADULT - PROBLEM SELECTOR PLAN 5
- continue AC with heparin gtt  - rate not controlled; can consider dig loading and monitor dig level closely
- continue AC with heparin gtt  - rate not controlled; can consider dig loading and monitor dig level closely

## 2021-07-20 NOTE — PROGRESS NOTE ADULT - NSICDXPILOT_GEN_ALL_CORE
[place]

## 2021-07-21 NOTE — DISCHARGE NOTE FOR THE EXPIRED PATIENT - HOSPITAL COURSE
85 yo F w/ h/o HTN and paroxysmal Afib on coumadin who presented with chest pain and syncope at home to [facility] , found to have trop I>40; underwent CTA negative for PE and transferred to NS. Initially normotensive and labs revealed hsTrop >10,000, lactate 2.5. EKG with Afib with RBBB, with Q waves on lateral leads. Bedside TTE with reduced EF ~15-20%. Repeat CT chest angio to r/o aortic dissection was negative for significant dissection. Underwent LHC  which showed non-obstructive coronaries. TTE with severe LV dysfunction (segmental with basal contractility and apical akinesis) with possibly severe AS (mean gradient 30, TINY 0.5). Became hypotensive on  (notably received metoprolol day prior). Transferred to CICU and started on /levophed and received IVF. Unclear etiology of severe LV dysfunction with elevated troponins in absence of non-obstructive coronaries - possibly Takotsubo's vs. embolic event which recanalized. Echo significant for severe aortic stenosis. Patient was being managed in the CICU for cardiogenic shock and acute decompensated systolic heart failure in the setting of severe AS. She has been on pressors and inotropes to support her. She had episodes of flash pulmonary edema requiring IV lasix and bipap.   Presented for Trans-catheter Balloon Aortic Valvotomy (BAV) to OR 23 with cardiac anesthesia. Patient had a critically stenosed aortic valve with cardiogenic shock on Milrinone Norepinephrine and Vasopressin. Patient was very orthopneic and hence was done with anesthesia, with General Endotracheal Anesthesia (Intubation).   During the procedure, patient developed VT/VF resistant to DC Shock 200 J X 7, external cardiac massage, 5 mg of Epi, Vasopressin 20 units,, CaCl2 2gm, Atropine 1 mg, NaHCO3 150 meq, Amiodarone 300 mg, Transvenous pacing.   At that point, after 15-20 minutes of full ACLS, it seemed that further attempts at resuscitation would be futile,  her rhythm and hemodynamics somewhat unexpectedly stabilized and a decision was made to continue with the BAV with an IABP in place.  Patient went into massive pulmonary edema with blood in the ETT but still able to maintain BP with Pacing and IABP. Patient was completely asystolic without transvenous pacing and had very little intrinsic ejection or BP without the IABP. Patient brought to CICU where GOC discussions were held with the family. Decision was made to make the patient full comfort measures and proceed with compassionate extubation with family at bedside.

## 2021-07-24 NOTE — ED PROVIDER NOTE - PSH
H/O bilateral cataract extraction    H/O nasal septoplasty    H/O parathyroidectomy    H/O total knee replacement, left    History of cholecystectomy    History of lumbar laminectomy  with microdiskectomy  History of temporal arteritis  b/l surgical repair  
pmd

## 2023-01-12 NOTE — ED PROVIDER NOTE - DURATION
Detail Level: Detailed Quality 431: Preventive Care And Screening: Unhealthy Alcohol Use - Screening: Patient not identified as an unhealthy alcohol user when screened for unhealthy alcohol use using a systematic screening method Quality 130: Documentation Of Current Medications In The Medical Record: Current Medications Documented Quality 226: Preventive Care And Screening: Tobacco Use: Screening And Cessation Intervention: Patient screened for tobacco use and is an ex/non-smoker week(s)

## 2025-02-24 NOTE — ED ADULT NURSE NOTE - NS_SISCREENINGSR_GEN_ALL_ED
Verified patient name and date of birth, scheduled procedure, and informed consent. Reviewed general discharge instructions and  information.  Assessed patient. Awake, alert, and oriented per baseline. Vital signs stable (see vital sign flowsheet). Respiratory status within defined limits, abdomen soft and non tender. Skin with in defined limits.     Initial RN admission and assessment performed and documented in Endoscopy navigator.     Patient evaluated by anesthesia in pre-procedure holding.     All procedural vital signs, airway assessment, and level of consciousness information monitored and recorded by anesthesia staff on the anesthesia record.     Report received from CRNA post procedure.  Patient transported to recovery area by RN.    Endoscopy post procedure time out was performed and specimens were verified with physician.    Endoscope was pre-cleaned at bedside immediately following procedure by AB.    
Negative

## 2025-07-17 NOTE — PHYSICAL THERAPY INITIAL EVALUATION ADULT - FOLLOWS COMMANDS/ANSWERS QUESTIONS, REHAB EVAL
100% of the time I will SWITCH the dose or number of times a day I take the medications listed below when I get home from the hospital:  None